# Patient Record
Sex: MALE | Race: WHITE | Employment: UNEMPLOYED | URBAN - NONMETROPOLITAN AREA
[De-identification: names, ages, dates, MRNs, and addresses within clinical notes are randomized per-mention and may not be internally consistent; named-entity substitution may affect disease eponyms.]

---

## 2021-08-18 ENCOUNTER — HOSPITAL ENCOUNTER (INPATIENT)
Facility: HOSPITAL | Age: 46
LOS: 16 days | Discharge: HOME | End: 2021-09-03
Attending: PHYSICIAN ASSISTANT | Admitting: STUDENT IN AN ORGANIZED HEALTH CARE EDUCATION/TRAINING PROGRAM
Payer: MEDICAID

## 2021-08-18 DIAGNOSIS — F22 PARANOIA (H): ICD-10-CM

## 2021-08-18 DIAGNOSIS — F22 DELUSIONS (H): ICD-10-CM

## 2021-08-18 DIAGNOSIS — F25.0 SCHIZOAFFECTIVE DISORDER, BIPOLAR TYPE (H): Primary | ICD-10-CM

## 2021-08-18 LAB
ALBUMIN SERPL-MCNC: 4.2 G/DL (ref 3.4–5)
ALBUMIN UR-MCNC: NEGATIVE MG/DL
ALP SERPL-CCNC: 63 U/L (ref 40–150)
ALT SERPL W P-5'-P-CCNC: 18 U/L (ref 0–70)
AMPHETAMINES UR QL: NOT DETECTED
ANION GAP SERPL CALCULATED.3IONS-SCNC: 4 MMOL/L (ref 3–14)
APPEARANCE UR: CLEAR
AST SERPL W P-5'-P-CCNC: 8 U/L (ref 0–45)
BARBITURATES UR QL SCN: NOT DETECTED
BASOPHILS # BLD AUTO: 0 10E3/UL (ref 0–0.2)
BASOPHILS NFR BLD AUTO: 0 %
BENZODIAZ UR QL SCN: DETECTED
BILIRUB SERPL-MCNC: 0.5 MG/DL (ref 0.2–1.3)
BILIRUB UR QL STRIP: NEGATIVE
BUN SERPL-MCNC: 13 MG/DL (ref 7–30)
BUPRENORPHINE UR QL: NOT DETECTED
CALCIUM SERPL-MCNC: 9.4 MG/DL (ref 8.5–10.1)
CANNABINOIDS UR QL: NOT DETECTED
CHLORIDE BLD-SCNC: 110 MMOL/L (ref 94–109)
CO2 SERPL-SCNC: 25 MMOL/L (ref 20–32)
COCAINE UR QL SCN: NOT DETECTED
COLOR UR AUTO: YELLOW
CREAT SERPL-MCNC: 1.03 MG/DL (ref 0.66–1.25)
D-METHAMPHET UR QL: NOT DETECTED
EOSINOPHIL # BLD AUTO: 0 10E3/UL (ref 0–0.7)
EOSINOPHIL NFR BLD AUTO: 0 %
ERYTHROCYTE [DISTWIDTH] IN BLOOD BY AUTOMATED COUNT: 12.2 % (ref 10–15)
ETHANOL SERPL-MCNC: <0.01 G/DL
GFR SERPL CREATININE-BSD FRML MDRD: 86 ML/MIN/1.73M2
GLUCOSE BLD-MCNC: 87 MG/DL (ref 70–99)
GLUCOSE UR STRIP-MCNC: NEGATIVE MG/DL
HCT VFR BLD AUTO: 44.1 % (ref 40–53)
HGB BLD-MCNC: 14.9 G/DL (ref 13.3–17.7)
HGB UR QL STRIP: NEGATIVE
IMM GRANULOCYTES # BLD: 0 10E3/UL
IMM GRANULOCYTES NFR BLD: 0 %
KETONES UR STRIP-MCNC: 10 MG/DL
LEUKOCYTE ESTERASE UR QL STRIP: NEGATIVE
LYMPHOCYTES # BLD AUTO: 2.5 10E3/UL (ref 0.8–5.3)
LYMPHOCYTES NFR BLD AUTO: 30 %
MCH RBC QN AUTO: 31.2 PG (ref 26.5–33)
MCHC RBC AUTO-ENTMCNC: 33.8 G/DL (ref 31.5–36.5)
MCV RBC AUTO: 92 FL (ref 78–100)
METHADONE UR QL SCN: NOT DETECTED
MONOCYTES # BLD AUTO: 0.4 10E3/UL (ref 0–1.3)
MONOCYTES NFR BLD AUTO: 5 %
MUCOUS THREADS #/AREA URNS LPF: PRESENT /LPF
NEUTROPHILS # BLD AUTO: 5.3 10E3/UL (ref 1.6–8.3)
NEUTROPHILS NFR BLD AUTO: 65 %
NITRATE UR QL: NEGATIVE
NRBC # BLD AUTO: 0 10E3/UL
NRBC BLD AUTO-RTO: 0 /100
OPIATES UR QL SCN: NOT DETECTED
OXYCODONE UR QL SCN: NOT DETECTED
PCP UR QL SCN: NOT DETECTED
PH UR STRIP: 5.5 [PH] (ref 4.7–8)
PLATELET # BLD AUTO: 290 10E3/UL (ref 150–450)
POTASSIUM BLD-SCNC: 4 MMOL/L (ref 3.4–5.3)
PROPOXYPH UR QL: NOT DETECTED
PROT SERPL-MCNC: 7.7 G/DL (ref 6.8–8.8)
RBC # BLD AUTO: 4.78 10E6/UL (ref 4.4–5.9)
RBC URINE: 0 /HPF
SARS-COV-2 RNA RESP QL NAA+PROBE: NEGATIVE
SODIUM SERPL-SCNC: 139 MMOL/L (ref 133–144)
SP GR UR STRIP: 1.03 (ref 1–1.03)
TRICYCLICS UR QL SCN: NOT DETECTED
UROBILINOGEN UR STRIP-MCNC: NORMAL MG/DL
WBC # BLD AUTO: 8.2 10E3/UL (ref 4–11)
WBC URINE: 1 /HPF

## 2021-08-18 PROCEDURE — 250N000013 HC RX MED GY IP 250 OP 250 PS 637: Performed by: PHYSICIAN ASSISTANT

## 2021-08-18 PROCEDURE — C9803 HOPD COVID-19 SPEC COLLECT: HCPCS

## 2021-08-18 PROCEDURE — 99284 EMERGENCY DEPT VISIT MOD MDM: CPT | Performed by: PHYSICIAN ASSISTANT

## 2021-08-18 PROCEDURE — 82040 ASSAY OF SERUM ALBUMIN: CPT | Performed by: PHYSICIAN ASSISTANT

## 2021-08-18 PROCEDURE — 81001 URINALYSIS AUTO W/SCOPE: CPT | Performed by: PHYSICIAN ASSISTANT

## 2021-08-18 PROCEDURE — U0005 INFEC AGEN DETEC AMPLI PROBE: HCPCS | Performed by: PHYSICIAN ASSISTANT

## 2021-08-18 PROCEDURE — 124N000001 HC R&B MH

## 2021-08-18 PROCEDURE — 82077 ASSAY SPEC XCP UR&BREATH IA: CPT | Performed by: PHYSICIAN ASSISTANT

## 2021-08-18 PROCEDURE — 99285 EMERGENCY DEPT VISIT HI MDM: CPT

## 2021-08-18 PROCEDURE — 36415 COLL VENOUS BLD VENIPUNCTURE: CPT | Performed by: PHYSICIAN ASSISTANT

## 2021-08-18 PROCEDURE — 250N000013 HC RX MED GY IP 250 OP 250 PS 637: Performed by: NURSE PRACTITIONER

## 2021-08-18 PROCEDURE — 80306 DRUG TEST PRSMV INSTRMNT: CPT | Performed by: PHYSICIAN ASSISTANT

## 2021-08-18 PROCEDURE — 85041 AUTOMATED RBC COUNT: CPT | Performed by: PHYSICIAN ASSISTANT

## 2021-08-18 RX ORDER — MAGNESIUM HYDROXIDE/ALUMINUM HYDROXICE/SIMETHICONE 120; 1200; 1200 MG/30ML; MG/30ML; MG/30ML
30 SUSPENSION ORAL EVERY 4 HOURS PRN
Status: DISCONTINUED | OUTPATIENT
Start: 2021-08-18 | End: 2021-09-03 | Stop reason: HOSPADM

## 2021-08-18 RX ORDER — HYDROXYZINE HYDROCHLORIDE 25 MG/1
25-50 TABLET, FILM COATED ORAL EVERY 4 HOURS PRN
Status: DISCONTINUED | OUTPATIENT
Start: 2021-08-18 | End: 2021-09-03 | Stop reason: HOSPADM

## 2021-08-18 RX ORDER — OLANZAPINE 10 MG/2ML
5-10 INJECTION, POWDER, FOR SOLUTION INTRAMUSCULAR 3 TIMES DAILY PRN
Status: DISCONTINUED | OUTPATIENT
Start: 2021-08-18 | End: 2021-08-19

## 2021-08-18 RX ORDER — ACETAMINOPHEN 325 MG/1
650 TABLET ORAL EVERY 4 HOURS PRN
Status: DISCONTINUED | OUTPATIENT
Start: 2021-08-18 | End: 2021-09-03 | Stop reason: HOSPADM

## 2021-08-18 RX ORDER — LORAZEPAM 1 MG/1
2 TABLET ORAL ONCE
Status: COMPLETED | OUTPATIENT
Start: 2021-08-18 | End: 2021-08-18

## 2021-08-18 RX ORDER — OLANZAPINE 5 MG/1
10 TABLET, ORALLY DISINTEGRATING ORAL ONCE
Status: COMPLETED | OUTPATIENT
Start: 2021-08-18 | End: 2021-08-18

## 2021-08-18 RX ORDER — OLANZAPINE 5 MG/1
5-10 TABLET ORAL 3 TIMES DAILY PRN
Status: DISCONTINUED | OUTPATIENT
Start: 2021-08-18 | End: 2021-08-19

## 2021-08-18 RX ORDER — LANOLIN ALCOHOL/MO/W.PET/CERES
3 CREAM (GRAM) TOPICAL
Status: DISCONTINUED | OUTPATIENT
Start: 2021-08-18 | End: 2021-09-03 | Stop reason: HOSPADM

## 2021-08-18 RX ORDER — OLANZAPINE 10 MG/1
10 TABLET, ORALLY DISINTEGRATING ORAL ONCE
Status: COMPLETED | OUTPATIENT
Start: 2021-08-18 | End: 2021-08-18

## 2021-08-18 RX ORDER — AMOXICILLIN 250 MG
1 CAPSULE ORAL 2 TIMES DAILY PRN
Status: DISCONTINUED | OUTPATIENT
Start: 2021-08-18 | End: 2021-09-03 | Stop reason: HOSPADM

## 2021-08-18 RX ORDER — OLANZAPINE 15 MG/1
30 TABLET ORAL EVERY EVENING
Status: ON HOLD | COMMUNITY
End: 2021-09-03

## 2021-08-18 RX ADMIN — OLANZAPINE 10 MG: 5 TABLET, ORALLY DISINTEGRATING ORAL at 16:41

## 2021-08-18 RX ADMIN — LORAZEPAM 2 MG: 1 TABLET ORAL at 22:03

## 2021-08-18 RX ADMIN — NICOTINE POLACRILEX 2 MG: 2 GUM, CHEWING ORAL at 22:00

## 2021-08-18 RX ADMIN — OLANZAPINE 10 MG: 10 TABLET, ORALLY DISINTEGRATING ORAL at 22:01

## 2021-08-18 ASSESSMENT — ENCOUNTER SYMPTOMS
DECREASED CONCENTRATION: 1
CHILLS: 0
CONFUSION: 0
NERVOUS/ANXIOUS: 1
AGITATION: 0
HALLUCINATIONS: 1
SLEEP DISTURBANCE: 1
FEVER: 0
DYSPHORIC MOOD: 1

## 2021-08-18 ASSESSMENT — ACTIVITIES OF DAILY LIVING (ADL)
DIFFICULTY_COMMUNICATING: NO
DOING_ERRANDS_INDEPENDENTLY_DIFFICULTY: NO
TOILETING_ISSUES: NO
WALKING_OR_CLIMBING_STAIRS_DIFFICULTY: NO
DIFFICULTY_EATING/SWALLOWING: NO
DRESSING/BATHING_DIFFICULTY: NO
FALL_HISTORY_WITHIN_LAST_SIX_MONTHS: NO
CONCENTRATING,_REMEMBERING_OR_MAKING_DECISIONS_DIFFICULTY: OTHER (SEE COMMENTS)

## 2021-08-18 ASSESSMENT — MIFFLIN-ST. JEOR: SCORE: 1569.81

## 2021-08-18 NOTE — PROGRESS NOTES
"Care Transitions focused note:      Met with patient briefly.  He is concerned about his mother and step dad waiting for him while he is here.  Very anxious and wanting to \"get the show on the road\"      Spoke to mother and step dad. [place]:    Patient has been homeless since Thanksgiving.  Floating between a friends and his mothers in [place], [place].    Patient is on Social Security Disability due to mental health reasons for quite some time.  Mother believes patient has dx of: Schizophrenia and Bipolar.  Has long standing history with [facility] who prescribes medications.  Had been on 2 meds but the NP at [facility] had patient stop one medication for sleep when she discovered he had actively been drinking alcohol of late.  Mother states patient is an alcoholic and has been drinking  Mother states patient has has been having visual hallucinations for the last 3 weeks and patient has been stating people are out to kill him.  Locks doors and has not left the home for approximately 2 weeks.    Mother and step father would like the patient admitted - they said he has spiraled downward and feels intervention is needed.      "

## 2021-08-18 NOTE — ED NOTES
"Care Transitions focused note:      Chart reviewed, patient presented with his mother and step dad.  Met with patient first and he stated he is uncomfortable staying at his mother's any longer and needs to get an apartment.  He also stated that he can go and stay with his father who lives in [place].  Andre provided little insight and is unsure why he is in the emergency department.  Denies being suicidal or homicidal at this time.  History of alcoholism but stated he has maybe had one drink in 3 weeks.      Met with mom and step dad.  They stated patient was living in [place] homeless and on the streets.  They called him in May and he was living in a garage and asked his mother to come and get him to stay with her.  He was living in the woods and a garage in [place] and told his mother he was going to be killed by \"natives\"  Mom Rafia reports that patient did fairly well when he first moved in with them but has been declining in recent weeks.    Pt was interacting with them, going on walks, eating with them etc.  In the recent weeks he has been having more paranoia, peering out windows feeling like the \"natives\" are out to get him and kill him.  Has been in the basement isolating and on his phone in the dark and not interacting.  Rafia also stated that he has no relationship with his father who is schizophrenic and Andre is not able to move in with him.     The deal when he moved in with Rafia is that he would get help for his mental health and alcohol issue.  He supposedly had a rule 25 at [facility] in [place] and they rec out patient but Andre told Rafia he lied on it.  Stating today to them that he wants help for his mental health.    Andre is not saying any of this to myself and the provider.  Rafia is very afraid that Andre is going to \"snap\"  He has not been taking his medications per Rafia (he told me that he was taking his meds) She feels he needs in patient for medication " adjustment and possible treatment after.    Updated provider.  DEC pending.    Will follow    PAO Triana

## 2021-08-18 NOTE — ED NOTES
"\"I have been living with my mom for about 2 months.  My mom is worried about me with a little bit of drinking.  I used to live in [place].  My mom and my step dad brought me here for my worsening depression.  My last drink was about 3 weeks ago.  I am putting stress on their marriage.\"  "

## 2021-08-19 PROBLEM — S42.301A RIGHT HUMERAL FRACTURE: Status: ACTIVE | Noted: 2019-03-22

## 2021-08-19 PROBLEM — Z87.898 HISTORY OF SEIZURE: Status: ACTIVE | Noted: 2020-12-23

## 2021-08-19 PROBLEM — F10.21 ALCOHOL USE DISORDER, MODERATE, IN EARLY REMISSION (H): Status: ACTIVE | Noted: 2020-12-23

## 2021-08-19 PROBLEM — N17.9 AKI (ACUTE KIDNEY INJURY) (H): Status: ACTIVE | Noted: 2019-03-22

## 2021-08-19 PROBLEM — E87.20 LACTIC ACIDOSIS: Status: ACTIVE | Noted: 2019-03-22

## 2021-08-19 PROCEDURE — 250N000013 HC RX MED GY IP 250 OP 250 PS 637: Performed by: NURSE PRACTITIONER

## 2021-08-19 PROCEDURE — 124N000001 HC R&B MH

## 2021-08-19 PROCEDURE — 99223 1ST HOSP IP/OBS HIGH 75: CPT | Performed by: NURSE PRACTITIONER

## 2021-08-19 RX ORDER — IBUPROFEN 600 MG/1
600 TABLET, FILM COATED ORAL 3 TIMES DAILY PRN
Status: ON HOLD | COMMUNITY
Start: 2020-07-12 | End: 2021-08-19

## 2021-08-19 RX ORDER — OLANZAPINE 10 MG/1
30 TABLET ORAL EVERY EVENING
Status: DISCONTINUED | OUTPATIENT
Start: 2021-08-19 | End: 2021-09-03 | Stop reason: HOSPADM

## 2021-08-19 RX ORDER — LIDOCAINE 4 G/G
1 PATCH TOPICAL EVERY 24 HOURS
Status: ON HOLD | COMMUNITY
Start: 2021-05-26 | End: 2021-08-19

## 2021-08-19 RX ORDER — QUETIAPINE FUMARATE 50 MG/1
50-100 TABLET, FILM COATED ORAL 2 TIMES DAILY PRN
Status: DISCONTINUED | OUTPATIENT
Start: 2021-08-19 | End: 2021-08-25

## 2021-08-19 RX ADMIN — NICOTINE POLACRILEX 2 MG: 2 GUM, CHEWING ORAL at 08:59

## 2021-08-19 RX ADMIN — OLANZAPINE 30 MG: 10 TABLET, FILM COATED ORAL at 20:03

## 2021-08-19 ASSESSMENT — ACTIVITIES OF DAILY LIVING (ADL)
DRESS: INDEPENDENT
DRESS: SCRUBS (BEHAVIORAL HEALTH)
ORAL_HYGIENE: INDEPENDENT
HYGIENE/GROOMING: INDEPENDENT
HYGIENE/GROOMING: INDEPENDENT
LAUNDRY: UNABLE TO COMPLETE
ORAL_HYGIENE: INDEPENDENT

## 2021-08-19 NOTE — ED NOTES
Pt agreeable to admission at this time. Pt is currently talking with Nurys from DEC again. Per provider Zyprexa not given at this time because pt is cooperative.

## 2021-08-19 NOTE — PROGRESS NOTES
08/18/21 2239   Patient Belongings   Patient Belongings locker;sent to security per site process   Patient Belongings Put in Hospital Secure Location (Security or Locker, etc.) cash/credit card;cell phone/electronics;money (see comment);wallet   Belongings Search Yes   Clothing Search Yes   Second Staff Yudelka MAURER   Comment Black hat, black tennis shoes, 3 pairs of underwear, grey long sleeve shirt, blue pen, blue long sleeve button up, jeans x2, white socks, blue backpack, black gaitor, white samsung , gray long sleeve shirt, electric shaver, lighter, blue portable , clack socks, green button-up, new pack of socks, black button up, black sweat pants, grey sweater, black sweatshirt   List items sent to safe: Corinne knife, 2 black smartphones w/ no damage in cases, [place] EBT card, [place] bank visa card, 2 [place] Ids, 3 $1 bills, 3 $20 bills, 10 $100 bills TOTAL= $1,063.  All other belongings put in assigned cubby in belongings room.     I have reviewed my belongings list on admission and verify that it is correct.     Patient signature_______________________________    Second staff witness (if patient unable to sign) ______________________________       I have received all my belongings at discharge.    Patient signature________________________________    Elida BARAHONA.  8/18/2021  10:44 PM

## 2021-08-19 NOTE — PLAN OF CARE
Problem: Adult Inpatient Plan of Care  Goal: Patient-Specific Goal (Individualized)  Description: Client will sleep 6-8 hour nightly.  Client will eat 50% of meals daily.  Client will attend 50% of groups daily.  Client will take medications as prescribed daily.  Client will perform ADL's daily.  Outcome: Improving     Face to face shift report received from RN. Rounding completed, pt observed. Client rested in room for 7 hours with eyes closed and respirations noted.Face to face report will be communicated to oncoming RN.    Matti Lorenzo RN  8/19/2021  6:10 AM

## 2021-08-19 NOTE — PLAN OF CARE
Prior to Admission Medication Reconciliation:     Medications added:   [] None  [x] As listed below:    Nicotine gum- pt confirms still using    Medications deleted:   [] None  [x] As listed below:    Ibuprofen- therapy completed    Lidocaine patch- therapy complete    Medications marked for review/removal by attending:  [x] None  [] As listed below:    Changes made to existing medications:   [] None  [x] As listed below:    Input strength of zyprexa- pt confirmed strength and instructions    Last times/dates taken verified with patient:  [x] Yes- completed myself  [] Prepared PTA medlist for review only. (will not be available to review personally)  [] Did not review with patient. Rx verification only. Review completed by nursing.    [] Nurse completed no changes made (double checked entries)  [] Unable to review with patient at this time:  [] Nurse completed/changes made:     Allergies listed at another location:  []Not applicable   []See below:    Allergy review:    [x]Did not review: reviewed by nursing  []Did not review: pt unable at this time  []Patient/MAR verified NKDA  []Patient/MAR verified current existing allergies: no changes made    Medication reconciliation sources:   []Patient  []Patient family member/emergency contact: **  [][facility] Report Review  []Epic Chart Review  [x]Care Everywhere review:  Medication Sig   ibuprofen (Motrin) 600 MG tablet   Take 1 Tab by mouth three times a day as needed for Pain . Administer with food.   nicotine polacrilex ([facility] Nicotine) 4 MG gum    Indications: Tobacco use disorder Take 1 Stick by mouth as needed for Smoking cessation.   lidocaine (Aspercreme, Salonpas) 4 % patch   Place 1 Patch onto the skin every 24 hours. Remove patch(es) after 12 hours.   OLANZapine (ZyPREXA) 15 MG tablet    Indications: Schizoaffective disorder, unspecified type (HCC) Take 2 Tablets (30 mg) by mouth every evening. Stop Restoril.       []Pharmacy med list: **  [x]Pharmacy phone  call  [x]Olanzapine 15 mg takes 2 tabs qpm 8/6/21 - refill too soon, [facility] it [facility] for  now (unsure where it was first filled at)  []Outside meds dispense report  []Nursing home or Assisted Living MAR:  []Other: **    Pharmacy desired at discharge: [facility]    Is patient on coumadin?  [x]No    Requests for consultation by provider or pharmacist:   [x] Patient understands why all of their meds were prescribed and how to take them. No questions.   [] Managing party has no questions.   [] Patient/ managing party has questions about the following:  [] Did not review with patient. Cannot assess.     Fill dates and reported compliancy:  [] Fill dates coincide with compliancy for all/most maintenance meds.   [] Fill dates do not coincide with compliancy with maintenance meds. See notes in PTA medlist and in comments.    [] Fill dates do not coincide with the following medications but pt reports compliancy:  [x] Did not review with patient. Cannot assess. Unsure where first fill was sent    Historian accuracy:  [] Excellent- alert and oriented, understands why meds were prescribed and how to take, able to answer specifics  [x] Good- alert and oriented, understands why meds were prescribed and how to take, some confusion   [] Fair- alert and oriented, doesn't know medications without list, cannot answer specifics about medications, but has a decent process for which to take at home  [] Poor- does not know medications, may not have a process to take at home, may be cognitively unable to review at this time  []Medication management done by family member or facility, no concerns about historian accuracy.   [] Did not review with patient. Cannot assess.     Medication Management:  [x] Manages meds independently  [] Family member/ other party manages meds:  [] Meds managed by staff at facility  [] Meds set up by home care, family/other party helps administer  [] Meds set up by home care, self administers  [] Did  not review with patient. Cannot assess.     Other medications aside from PTA:  [x] Denies taking any medications aside from those listed in PTA meds  [] Reports taking another medication(s) but cannot recall the name(s)  [] Refuses to say.  [] Did not review with patient. Cannot assess.     Comments: none     Lori Holguin on 8/19/2021 at 11:45 AM       Discrepancies: [x] No []Not Applicable []Yes: listed below    Time spent on medication reconciliation:   [x]5-20 mins  []20-40 mins  []> 40 mins    Issues completing PTA medication reconciliation:  [] On hold for a long time  [] Waited for a call back  [] Fax didn't come through  [] Fax took a long time  [] Other:    Notifying appropriate party of changes/additions/discrepancies:  [x]No pertinent changes made, notification not necessary.   [] Notified attending provider via text page/phone call  [] Notified attending provider in person  [] Notified pharmacy  [] Notified nurse  [] Attending provider not available, left detailed notes  [] Pt is not admitted to floor yet, PTA meds completed before admission.

## 2021-08-19 NOTE — PLAN OF CARE
"ADMISSION NOTE    Reason for admission worsening depression  Safety concerns none  Risk for or history of violence no.   Full skin assessment: WDL    Patient arrived on unit from ED  accompanied by security on 8/18/2021  10:29 PM.   Status on arrival: calm, cooperative, flat affect  /83   Pulse 94   Temp 98.3  F (36.8  C) (Temporal)   Resp 16   Ht 1.778 m (5' 10\")   Wt 68.9 kg (151 lb 12.8 oz)   SpO2 99%   BMI 21.78 kg/m    Patient given tour of unit and Welcome to  unit papers given to patient, wanding completed, belongings inventoried, and admission assessment completed.   Patient's legal status on arrival is voluntary. Appropriate legal rights discussed with and copy given to patient. Patient Bill of Rights discussed with and copy given to patient.   Patient denies SI, HI, and thoughts of self harm and contracts for safety while on unit.      Alyssia Ontiveros RN  8/18/2021  10:29 PM              "

## 2021-08-19 NOTE — PLAN OF CARE
"Social Service Psychosocial Assessment  Presenting Problem: Pt presented to the ED by Self and Family/Friends related to concerns for increasing paranoia, alcohol use, isolation, and concerns about visual hallucinations.   Marital Status: Not    Spouse / Children: None   Psychiatric TX HX: Has history of inpatient hospitalizations, last being 15 years ago. [facility] has been [facility]. Pt has not bee to our unit before. In the recent weeks pt has been having more paranoia, peering out windows feeling like the \"natives\" are out to get him and kill him. Has been in the basement isolating and on his phone in the dark and not interacting.   Suicide Risk Assessment: Pt denies feeling suicidal on admit and denies hx of SI. Pt denies any SI today.   Access to Lethal Means (explain):  Denies  Family Psych HX:  Father who is schizophrenic  A & Ox: x3    Medication Adherence: Unknown   Medical Issues: See H&P    Visual -Motor Functioning: Good    Communication Skills /Needs: Good   Ethnicity: White   Spirituality/Congregation Affiliation: Sabianism \"I don't go to Caodaism\".    Clergy Request: No   History: Denies   Living Situation: Pt has been living with their mom for about 2 months. Patient was living in [facility] and on the streets. He was living in the woods and a garage.    ADL s: Independent  Education: Graduated High school  Financial Situation: Patient is on social security    Occupation: Unemployed  Leisure & Recreation: Fishing  Childhood History: Pt lived with mom and dad growing up. Parent got . \"it was hard\".     Trauma Abuse HX: \"jaime, I don't know\"     Relationship / Sexuality: Single     Substance Use/ Abuse: Utox positive for Benzodiazepines. History of alcoholism but stated he has maybe had one drink in 3 weeks. Pt denies and other substance abuse.   Chemical Dependency Treatment HX: Pt states they had a rule 25 at [facility] in [place] and they reccomended out patient. Pt " is unsure how long ago this was. Pt has been to a cd treatment back in 2000.  Legal Issues: Pt denies  Significant Life Events: Unknown   Strengths: In a safe environment, open to services  Challenges /Limitation: cd, mental health, poor coping skills, homelessness.   Patient Support Contact (Include name, relationship, number, and summary of conversation):  Pt has DEBBIE signed for Rafia Freitas (mother) [phone]  Interventions:       Community-Based Programs- Would benefit    Medical/Dental Care- No PCP on file    CD Evaluation/Rule 25/Aftercare- Would Benefit    Medication Management- Would Benefit    Individual Therapy- Would benefit    Housing/Placement- Homeless    Insurance Coverage- MEDICAID    Financial Assistance- Social Security for disability     Commit/Powers Screening-    Suicide Risk Assessment-  Pt denies feeling suicidal on admit and denies hx of SI. Pt denies any SI today.     High Risk Safety Plan- Talk to supports; Call crisis lines; Go to local ER if feeling suicidal.  PAO WARD  8/19/2021  8:13 AM

## 2021-08-19 NOTE — ED PROVIDER NOTES
History     Chief Complaint   Patient presents with     Depression     long hx and sees a psychiatrist at [facility]     Alcohol Intoxication     last drank alcohol a few weeks ago.      The history is provided by the patient.     Andre Bustos is a 46 year old male who resented to the emergency department ambulatory along with mother and father for evaluation of worsening depression, delusions, paranoia, and what sounds to be auditory hallucinations.  The patient used to live in the [facility] area and was homeless as well as an intermittent drug abuse and alcohol abuser.  Recently living with his parents since May in [facility].  He is supposed to take Zyprexa daily for underlying psychiatric disorder.  Denies homicidal or suicidal ideation.  Denies any recent drug abuse.  Denies any recent alcohol abuse.      Allergies:  No Known Allergies    Problem List:    Patient Active Problem List    Diagnosis Date Noted     Delusions (H) 08/18/2021     Priority: Medium     Paranoia (H) 08/18/2021     Priority: Medium        Past Medical History:    No past medical history on file.    Past Surgical History:    No past surgical history on file.    Family History:    No family history on file.    Social History:  Marital Status:  Single [1]  Social History     Tobacco Use     Smoking status: Not on file   Substance Use Topics     Alcohol use: Not on file     Drug use: Not on file        Medications:    OLANZapine (ZYPREXA PO)          Review of Systems   Constitutional: Negative for chills and fever.   Cardiovascular: Negative for chest pain.   Psychiatric/Behavioral: Positive for behavioral problems, decreased concentration, dysphoric mood, hallucinations and sleep disturbance. Negative for agitation, confusion, self-injury and suicidal ideas. The patient is nervous/anxious.        Physical Exam   BP: 107/79  Pulse: (!) 123  Temp: 98.1  F (36.7  C)  Resp: (!) 1  SpO2: 97 %      Physical Exam  Vitals and nursing  note reviewed.   Constitutional:       General: He is not in acute distress.     Appearance: Normal appearance. He is normal weight. He is not toxic-appearing or diaphoretic.   Pulmonary:      Effort: Pulmonary effort is normal.   Skin:     General: Skin is warm and dry.      Capillary Refill: Capillary refill takes less than 2 seconds.   Neurological:      General: No focal deficit present.      Mental Status: He is alert and oriented to person, place, and time.   Psychiatric:      Comments: Paranoid         ED Course     ED Course as of Aug 18 1937   Wed Aug 18, 2021   1441 Parents report worsening behaviors at home including significant paranoia.  They report the patient is not taking his medications.  We will be waiting on DEC      1842 Spoke with DEC. As of now they cannot find a reason too hold.  Calling mother now for additional information       1919 Patient is agreeable for admission.  I believe this is certainly the most reasonable plan.  Certainly decompensating.        Procedures              Critical Care time:  none               Results for orders placed or performed during the hospital encounter of 08/18/21 (from the past 24 hour(s))   CBC with platelets differential    Narrative    The following orders were created for panel order CBC with platelets differential.  Procedure                               Abnormality         Status                     ---------                               -----------         ------                     CBC with platelets and d...[[id number]]                      Final result                 Please view results for these tests on the individual orders.   Comprehensive metabolic panel   Result Value Ref Range    Sodium 139 133 - 144 mmol/L    Potassium 4.0 3.4 - 5.3 mmol/L    Chloride 110 (H) 94 - 109 mmol/L    Carbon Dioxide (CO2) 25 20 - 32 mmol/L    Anion Gap 4 3 - 14 mmol/L    Urea Nitrogen 13 7 - 30 mg/dL    Creatinine 1.03 0.66 - 1.25 mg/dL    Calcium 9.4 8.5 -  10.1 mg/dL    Glucose 87 70 - 99 mg/dL    Alkaline Phosphatase 63 40 - 150 U/L    AST 8 0 - 45 U/L    ALT 18 0 - 70 U/L    Protein Total 7.7 6.8 - 8.8 g/dL    Albumin 4.2 3.4 - 5.0 g/dL    Bilirubin Total 0.5 0.2 - 1.3 mg/dL    GFR Estimate 86 >60 mL/min/1.73m2   Ethyl Alcohol Level   Result Value Ref Range    Alcohol ethyl <0.01 <=0.01 g/dL   CBC with platelets and differential   Result Value Ref Range    WBC Count 8.2 4.0 - 11.0 10e3/uL    RBC Count 4.78 4.40 - 5.90 10e6/uL    Hemoglobin 14.9 13.3 - 17.7 g/dL    Hematocrit 44.1 40.0 - 53.0 %    MCV 92 78 - 100 fL    MCH 31.2 26.5 - 33.0 pg    MCHC 33.8 31.5 - 36.5 g/dL    RDW 12.2 10.0 - 15.0 %    Platelet Count 290 150 - 450 10e3/uL    % Neutrophils 65 %    % Lymphocytes 30 %    % Monocytes 5 %    % Eosinophils 0 %    % Basophils 0 %    % Immature Granulocytes 0 %    NRBCs per 100 WBC 0 <1 /100    Absolute Neutrophils 5.3 1.6 - 8.3 10e3/uL    Absolute Lymphocytes 2.5 0.8 - 5.3 10e3/uL    Absolute Monocytes 0.4 0.0 - 1.3 10e3/uL    Absolute Eosinophils 0.0 0.0 - 0.7 10e3/uL    Absolute Basophils 0.0 0.0 - 0.2 10e3/uL    Absolute Immature Granulocytes 0.0 <=0.0 10e3/uL    Absolute NRBCs 0.0 10e3/uL   UA with Microscopic reflex to Culture    Specimen: Urine, Midstream   Result Value Ref Range    Color Urine Yellow Colorless, Straw, Light Yellow, Yellow    Appearance Urine Clear Clear    Glucose Urine Negative Negative mg/dL    Bilirubin Urine Negative Negative    Ketones Urine 10  (A) Negative mg/dL    Specific Gravity Urine 1.028 1.003 - 1.035    Blood Urine Negative Negative    pH Urine 5.5 4.7 - 8.0    Protein Albumin Urine Negative Negative mg/dL    Urobilinogen Urine Normal Normal, 2.0 mg/dL    Nitrite Urine Negative Negative    Leukocyte Esterase Urine Negative Negative    Mucus Urine Present (A) None Seen /LPF    RBC Urine 0 <=2 /HPF    WBC Urine 1 <=5 /HPF    Narrative    Urine Culture not indicated   Urine Drugs of Abuse Screen    Narrative    The following  orders were created for panel order Urine Drugs of Abuse Screen.  Procedure                               Abnormality         Status                     ---------                               -----------         ------                     Urine Drugs of Abuse Scr...[[id number]]  Abnormal            Final result                 Please view results for these tests on the individual orders.   Urine Drugs of Abuse Screen Panel 13   Result Value Ref Range    Cannabinoids (74-ahf-8-carboxy-9-THC) Not Detected Not Detected, Indeterminate    Phencyclidine Not Detected Not Detected, Indeterminate    Cocaine (Benzoylecgonine) Not Detected Not Detected, Indeterminate    Methamphetamine (d-Methamphetamine) Not Detected Not Detected, Indeterminate    Opiates (Morphine) Not Detected Not Detected, Indeterminate    Amphetamine (d-Amphetamine) Not Detected Not Detected, Indeterminate    Benzodiazepines (Nordiazepam) Detected (A) Not Detected, Indeterminate    Tricyclic Antidepressants (Desipramine) Not Detected Not Detected, Indeterminate    Methadone Not Detected Not Detected, Indeterminate    Barbiturates (Butalbital) Not Detected Not Detected, Indeterminate    Oxycodone Not Detected Not Detected, Indeterminate    Propoxyphene (Norpropoxyphene) Not Detected Not Detected, Indeterminate    Buprenorphine Not Detected Not Detected, Indeterminate       Medications   LORazepam (ATIVAN) tablet 2 mg (2 mg Oral Not Given 8/18/21 1812)   OLANZapine zydis (zyPREXA) ODT tab 10 mg (has no administration in time range)   OLANZapine zydis (zyPREXA) ODT tab 10 mg (10 mg Oral Given 8/18/21 1641)       Assessments & Plan (with Medical Decision Making)   46-year-old male who presented to the emergency department along with parents for evaluation of worsening mental health.  No SI or HI. DEC assessment.  Discussion with parents.  I believe he would fit any reasonable indication for behavioral health admission.  Likely noncompliant with his  medications.  Significant decompensation over the last 1 month.  Graciously excepted by [facility].    This document was prepared using a combination of typing and voice generated software.  While every attempt was made for accuracy, spelling and grammatical errors may exist.    I have reviewed the nursing notes.    I have reviewed the findings, diagnosis, plan and need for follow up with the patient.       New Prescriptions    No medications on file       Final diagnoses:   Delusions (H)   Paranoia (H)       8/18/2021   [facility]     Kermit Greene PA-C  08/18/21 1938

## 2021-08-19 NOTE — ED NOTES
Pt was changed into scrubs with much redirection. Pt seems to agitated and unwilling to stay at the hospital overnight.    No

## 2021-08-19 NOTE — H&P
"Psychiatric Eval/H&P  Patient Name: Andre Bustos   YOB: 1975  Age: 46 year old  [id number]    Primary Physician: No primary care provider on file.   Completed By: Hemalatah Serrato NP     CC: Increased paranoia    HPI   Andre Bustos is a 46 year old single male who was brought in to the [facility] ED by his family for evaluation of increased paranoia and visual hallucinations. Family reported that he has not been taking his medications, though patient states that he is. Moved in with mother and stepfather about 2 months ago after being homeless in [place]. Mother reported feeling that he is \"spiraling downward\" and needs help with mental health and chemical dependency. History of alcohol abuse, though minimal use in last 3 weeks per his report. He was agreeable to voluntary admission and was transferred to behavioral health for further evaluation.    Andre is quite dismissive today when I attempt to meet with him, states he did not sleep last night due to being in the ED. \"Can you ask those questions later, I'm tired\". We do review that he sees a psychiatric provider through [facility], most recently prescribed Zyprexa. He denies any hallucinations at this time and denies suicidal or homicidal ideation.     Collateral was obtained from his mother, who brought him to the ED. She reports that he has been paranoid, has been hiding in their basement in the dark and peeking out the windows. Reports that the \"natives\" are going to come and kill him. Mother reports that he has been having visual hallucinations for the last 3 weeks. She also reports that he has not been compliant with medication, though the patient insists that he has been taking them. She would like to see him get help for his MH and his alcohol use.     In review of available records it appears that he is currently prescribed Zyprexa 30 mg at bedtime. For the last several years it appears that he was also taking Temazepam at bedtime, though " "his provider discontinued this at some point when they found out he had been drinking. Unclear when this was stopped. In discussion today he does indicate that the Zyprexa works and wishes to continue on this. It does appear that his mental health has been relatively stable on Zyprexa for many years with last hospitalization being at least 10 years ago. It is likely that he was not taking the medication prior to admission, possibly due to issues related to his relocation from [place], where his services are based, to his mother's home near [place].       [facility]     Past Psychiatric History:   Reports last hospitalization about 15 years ago. Gets his mental health care through [facility] in [place]. Notes indicate history of schizoaffective disorder, major depressive disorder, alcohol use disorder. Currently taking Zyprexa at bedtime. Historical meds include Temazepam. Appears to have been on this combination for at least the last 10 years.     Social History:   Notes indicate he grew up with mother and father until they . Originally from [place], [place]. Graduated high school. Not , no children. Had been homeless in [place] though 2 months ago went to live with mother and stepfather. Unemployed, on social security.      Chemical Use History:   Records show history of alcohol use disorder. History of ETOH withdrawal seizures in 2019. Reports having one drink in the last 3 weeks.      Family Psychiatric History:   Records indicate father with schizophrenia.       MSE/PSYCH  PSYCHIATRIC EXAM  /75 (BP Location: Right arm)   Pulse 97   Temp 97.5  F (36.4  C) (Temporal)   Resp 18   Ht 1.778 m (5' 10\")   Wt 68.9 kg (151 lb 12.8 oz)   SpO2 98%   BMI 21.78 kg/m    -Appearance/Behavior:  No apparent distress and Appears stated age, slightly disheveled    -Attitude:guarded, dismissive  -Motor: normal or unremarkable.  -Gait: not observed, patient in bed    -Abnormal involuntary movements: none noted  -Mood: " "irritable today, reports \"tired\"  -Affect: mood congruent  -Speech: clear, coherent                 -Thought process/associations: difficult to assess though seems linear during our brief interaction  -Thought content: difficult to assess, had been expressing paranoia at home  -Perceptual disturbances: denies today             -Suicidal/Homicidal Ideation: denies any suicidal or homicidal ideation  -Judgment: Limited.  -Insight: Limited.  *Orientation: person, place, time  *Memory: seems to be intact.  *Attention: short, dismissive  *Language: fluent, no aphasias, able to repeat phrases and name objects. Vocab intact.  *Fund of information: appropriate for education            Medical Review of Systems:   Medical History and ROS  Prior to Admission medications    Medication Sig Start Date End Date Taking? Authorizing Provider   ibuprofen (ADVIL/MOTRIN) 600 MG tablet Take 600 mg by mouth 3 times daily as needed 7/12/20  Yes Reported, Patient   Lidocaine (LIDOCARE) 4 % Patch Place 1 patch onto the skin every 24 hours 5/26/21  Yes Reported, Patient   nicotine polacrilex (NICORETTE) 4 MG gum Take 4 mg by mouth as needed 1/22/21  Yes Reported, Patient   OLANZapine (ZYPREXA) 15 MG tablet Take 30 mg by mouth daily    Yes Reported, Patient     No Known Allergies     No past medical history on file.     No past surgical history on file.      Physical Exam and Review of Systems: see H&P completed by Veronica Church CNP. Reviewed with no notable changes.         Labs:   Results for orders placed or performed during the hospital encounter of 08/18/21   CBC with platelets differential     Status: None    Narrative    The following orders were created for panel order CBC with platelets differential.  Procedure                               Abnormality         Status                     ---------                               -----------         ------                     CBC with platelets and d...[[id number]]                      " Final result                 Please view results for these tests on the individual orders.   Comprehensive metabolic panel     Status: Abnormal   Result Value Ref Range    Sodium 139 133 - 144 mmol/L    Potassium 4.0 3.4 - 5.3 mmol/L    Chloride 110 (H) 94 - 109 mmol/L    Carbon Dioxide (CO2) 25 20 - 32 mmol/L    Anion Gap 4 3 - 14 mmol/L    Urea Nitrogen 13 7 - 30 mg/dL    Creatinine 1.03 0.66 - 1.25 mg/dL    Calcium 9.4 8.5 - 10.1 mg/dL    Glucose 87 70 - 99 mg/dL    Alkaline Phosphatase 63 40 - 150 U/L    AST 8 0 - 45 U/L    ALT 18 0 - 70 U/L    Protein Total 7.7 6.8 - 8.8 g/dL    Albumin 4.2 3.4 - 5.0 g/dL    Bilirubin Total 0.5 0.2 - 1.3 mg/dL    GFR Estimate 86 >60 mL/min/1.73m2   Ethyl Alcohol Level     Status: Normal   Result Value Ref Range    Alcohol ethyl <0.01 <=0.01 g/dL   UA with Microscopic reflex to Culture     Status: Abnormal    Specimen: Urine, Midstream   Result Value Ref Range    Color Urine Yellow Colorless, Straw, Light Yellow, Yellow    Appearance Urine Clear Clear    Glucose Urine Negative Negative mg/dL    Bilirubin Urine Negative Negative    Ketones Urine 10  (A) Negative mg/dL    Specific Gravity Urine 1.028 1.003 - 1.035    Blood Urine Negative Negative    pH Urine 5.5 4.7 - 8.0    Protein Albumin Urine Negative Negative mg/dL    Urobilinogen Urine Normal Normal, 2.0 mg/dL    Nitrite Urine Negative Negative    Leukocyte Esterase Urine Negative Negative    Mucus Urine Present (A) None Seen /LPF    RBC Urine 0 <=2 /HPF    WBC Urine 1 <=5 /HPF    Narrative    Urine Culture not indicated   CBC with platelets and differential     Status: None   Result Value Ref Range    WBC Count 8.2 4.0 - 11.0 10e3/uL    RBC Count 4.78 4.40 - 5.90 10e6/uL    Hemoglobin 14.9 13.3 - 17.7 g/dL    Hematocrit 44.1 40.0 - 53.0 %    MCV 92 78 - 100 fL    MCH 31.2 26.5 - 33.0 pg    MCHC 33.8 31.5 - 36.5 g/dL    RDW 12.2 10.0 - 15.0 %    Platelet Count 290 150 - 450 10e3/uL    % Neutrophils 65 %    % Lymphocytes 30 %     % Monocytes 5 %    % Eosinophils 0 %    % Basophils 0 %    % Immature Granulocytes 0 %    NRBCs per 100 WBC 0 <1 /100    Absolute Neutrophils 5.3 1.6 - 8.3 10e3/uL    Absolute Lymphocytes 2.5 0.8 - 5.3 10e3/uL    Absolute Monocytes 0.4 0.0 - 1.3 10e3/uL    Absolute Eosinophils 0.0 0.0 - 0.7 10e3/uL    Absolute Basophils 0.0 0.0 - 0.2 10e3/uL    Absolute Immature Granulocytes 0.0 <=0.0 10e3/uL    Absolute NRBCs 0.0 10e3/uL   Urine Drugs of Abuse Screen Panel 13     Status: Abnormal   Result Value Ref Range    Cannabinoids (66-knc-8-carboxy-9-THC) Not Detected Not Detected, Indeterminate    Phencyclidine Not Detected Not Detected, Indeterminate    Cocaine (Benzoylecgonine) Not Detected Not Detected, Indeterminate    Methamphetamine (d-Methamphetamine) Not Detected Not Detected, Indeterminate    Opiates (Morphine) Not Detected Not Detected, Indeterminate    Amphetamine (d-Amphetamine) Not Detected Not Detected, Indeterminate    Benzodiazepines (Nordiazepam) Detected (A) Not Detected, Indeterminate    Tricyclic Antidepressants (Desipramine) Not Detected Not Detected, Indeterminate    Methadone Not Detected Not Detected, Indeterminate    Barbiturates (Butalbital) Not Detected Not Detected, Indeterminate    Oxycodone Not Detected Not Detected, Indeterminate    Propoxyphene (Norpropoxyphene) Not Detected Not Detected, Indeterminate    Buprenorphine Not Detected Not Detected, Indeterminate   Asymptomatic COVID-19 Virus (Coronavirus) by PCR Nasopharyngeal     Status: Normal    Specimen: Nasopharyngeal; Swab   Result Value Ref Range    SARS CoV2 PCR Negative Negative    Narrative    Testing was performed using the Xpert Xpress SARS-CoV-2 Assay on the   Cepheid Gene-Xpert Instrument Systems. Additional information about   this Emergency Use Authorization (EUA) assay can be found via the Lab   Guide. This test should be ordered for the detection of SARS-CoV-2 in   individuals who meet SARS-CoV-2 clinical and/or  epidemiological   criteria. Test performance is unknown in asymptomatic patients. This   test is for in vitro diagnostic use under the FDA EUA for   laboratories certified under CLIA to perform high complexity testing.   This test has not been FDA cleared or approved. A negative result   does not rule out the presence of PCR inhibitors in the specimen or   target RNA in concentration below the limit of detection for the   assay. The possibility of a false negative should be considered if   the patient's recent exposure or clinical presentation suggests   COVID-19. This test was validated by [facility]. This laboratory is certified under the Clinical Laboratory Improve  ment Amendments (CLIA) as qualified to perform high complexity testing.   Urine Drugs of Abuse Screen     Status: Abnormal    Narrative    The following orders were created for panel order Urine Drugs of Abuse Screen.  Procedure                               Abnormality         Status                     ---------                               -----------         ------                     Urine Drugs of Abuse Scr...[[id number]]  Abnormal            Final result                 Please view results for these tests on the individual orders.          Assessment/Impression: This is a 46 year old yo male with a history of schizoaffective disorder- bipolar type and alcohol abuse. Mother brought him to the ED due to increase in paranoia and hallucinations. Reported that he had been hiding in their basement in the dark for past 2 weeks, believes that there are people after him. Patient reports that he has been compliant with his Zyprexa, though mother indicates that he has not been taking it. It does appear that he has remained relatively stable on Zyprexa for at least the last 10 years, so it is likely that he was not as compliant as he reports. He did recently move to his mother's home near [place], [place] in the past 2 months from  [place] where he had been homeless. Current psych provider is still in [place] and per documentation it appears that he currently has a prescription for Zyprexa waiting for him to  at [facility]. Will ensure that he is able to obtain his prescriptions at discharge, whether they be mailed or transferred to pharmacy closer to his current residence. There is also a documented history of alcohol use disorder, though patient reports having only one drink in the last 3 weeks. I believe that he may have had a Rule 25 completed recently, will see if this can be followed up on.     Educated regarding medication indications, risks, benefits, side effects, contraindications and possible interactions. Verbally expressed understanding.     DX:  Schizoaffective disorder- bipolar type (by history)  Alcohol use disorder, moderate       Plan:  Admit to Unit: [address] [facility]  Attending: Hemalatha Serrato CNP  Patient is: Voluntary  Other routine labs were notable for utox +benzos  Monitor for target symptoms: decrease in paranoia and hallucinations  Provide a safe environment and therapeutic milieu.   Continue Zyprexa 30 mg at bedtime  Possibly completed Rule 25 recently?- unclear whether he wishes to pursue treatment      Anticipated length of stay: 3-5 days       FERDINAND Black, CNP

## 2021-08-19 NOTE — PLAN OF CARE
Problem: Behavioral Health Plan of Care  Goal: Patient-Specific Goal (Individualization)  Description: Pt will sleep at least 6-8 hours per NOC shift.  Pt will eat at least 50% of meals.  Pt will be medication compliant.  Pt will follow treatment team recommendations,  Pt will attend at least 50% of groups.      Outcome: Improving     Problem: Thought Process Alteration  Goal: Optimal Thought Clarity  Description: Pt will report no paranoid statements by discharge.  Pt will be able to maintain a linear and reality based conversation by discharge    Outcome: Improving   Face to face shift report received from bob,rn Rounding completed, pt observed.    Pt has flat affect. Is pleasant and cooperative with staff, and taking medication.  Pt denies pain, hallucination or delusions.  Stayed in his room the entire evening.  Encouraged pt to let staff know of any needs.  Face to face report will be communicated to oncoming RN.    Alyssia URIBE RN  8/19/2021  9:14 PM

## 2021-08-19 NOTE — PLAN OF CARE
"Face to face shift report received from Matti HAMLIN RN. Rounding completed, pt observed.    Problem: Behavioral Health Plan of Care  Goal: Patient-Specific Goal (Individualization)  Description: Pt will sleep at least 6-8 hours per NOC shift.  Pt will eat at least 50% of meals.  Pt will be medication compliant.  Pt will follow treatment team recommendations,  Pt will attend at least 50% of groups.      Outcome: Improving  Note: Shift Summery:  Patient in bed at the start of this shift.  Patient cooperative with nurse.   States that he has been staying with his mother in [place] and wants help with getting into treatment.  Patient states he is \"Bipolar\" and insisted he has been compliant with medications at home although this nurse had a conversation with his mother via telephone that patient is not compliant with meds and that he has an alcohol issue and he needs help.  Denies hallucinations or delusions.    Problem: Thought Process Alteration  Goal: Optimal Thought Clarity  Description: Pt will report no paranoid statements by discharge.  Pt will be able to maintain a linear and reality based conversation by discharge    Outcome: Improving     Problem: Depression  Goal: Improved Mood  Description: Pt will report manageable levels of depression and anxiety by discharge.  Pt will contract for safety while on the unit.  Pt will report worsening of symptoms and any suicidal ideation.  Outcome: No Change   Face to face report will be communicated to oncoming RN.    Ashley Hunter RN  8/19/2021      "

## 2021-08-19 NOTE — PLAN OF CARE
Problem: Behavioral Health Plan of Care  Goal: Patient-Specific Goal (Individualization)  Description:  pt will  Note: 1. Sleep at least 6-8 hours per night  2. Eat at least 50% of meals   3. Attend at least 50% of group therapy sessions daily  4. Cooperate with treatment team recommendations  5. Take prescribed medications

## 2021-08-19 NOTE — SAFE
"Andre Bustos  August 18, 2021  SAFE Note    Critical Safety Issues: Patient has some anxiety about inpatient mental health placement.  He becomes anxious at thoughts and discussions regarding interactions with other people.  Thus far however pt has been cooperative and redirectable.     **Please note that CareEverywhere shows a hx         of seizures thought to be from alcohol withdrawal.**        Current Suicidal Ideation/Self-Injurious Concerns/Methods: None - N/A      Current or Historical Inappropriate Sexual Behavior: Unknown      Current or Historical Aggression/Homicidal Ideation: Impaired Self-Control      Triggers: Patient reportedly has been having visual hallucinations-- he believes there are others who wish to do him harm; are \"out to get him.\"  He becomes paranoid and anxious when he believes he is being \"controlled\" or \"railroaded.\"              Updated care team: Yes:     For additional details see full LMHP assessment.       Nurys Obregon, LICSW          "

## 2021-08-19 NOTE — H&P
[facility]    History and Physical  Medical Services       Date of Admission:  8/18/2021  Date of Service (when I saw the patient): 08/19/21    Assessment & Plan     Principal Problem:    Delusions (H)    Active Medical Problems:    Paranoia (H)    ED course- covid negative, cmp, CBC unremarkable, UA- no infection.     Pt medically stable, no acute medical concerns. No chronic medical problems identified. Will sign off. Please consult for any new medical issues or concerns.       Code Status: Full Code    Veronica Church CNP    Primary Care Physician   No primary care provider on file.    Chief Complaint   Psych evaluation     History is obtained from the patient and medical     History of Present Illness   (per ED) Andre Bustos is a 46 year old male who resented to the emergency department ambulatory along with mother and father for evaluation of worsening depression, delusions, paranoia, and what sounds to be auditory hallucinations.  The patient used to live in the [facility] and was homeless as well as an intermittent drug abuse and alcohol abuser.  Recently living with his parents since May in [facility].  He is supposed to take Zyprexa daily for underlying psychiatric disorder.  Denies homicidal or suicidal ideation.  Denies any recent drug abuse.  Denies any recent alcohol abuse.    Past Medical History    I have reviewed this patient's medical history and updated it with pertinent information if needed.   No past medical history on file.    Past Surgical History   I have reviewed this patient's surgical history and updated it with pertinent information if needed.  No past surgical history on file.    Prior to Admission Medications   Prior to Admission Medications   Prescriptions Last Dose Informant Patient Reported? Taking?   Lidocaine (LIDOCARE) 4 % Patch   Yes Yes   Sig: Place 1 patch onto the skin every 24 hours   OLANZapine (ZYPREXA) 15 MG tablet   Yes Yes   Sig: Take 30 mg by  mouth daily    ibuprofen (ADVIL/MOTRIN) 600 MG tablet   Yes Yes   Sig: Take 600 mg by mouth 3 times daily as needed   nicotine polacrilex (NICORETTE) 4 MG gum   Yes Yes   Sig: Take 4 mg by mouth as needed      Facility-Administered Medications: None     Allergies   No Known Allergies    Social History   I have reviewed this patient's social history and updated it with pertinent information if needed. Andre Bustos      Family History   I have reviewed this patient's family history and updated it with pertinent information if needed.   No family history on file.    Review of Systems   CONSTITUTIONAL:  negative  EYES:  negative  HEENT:  negative  RESPIRATORY:  negative  CARDIOVASCULAR:  negative  GASTROINTESTINAL:  negative  GENITOURINARY:  negative  INTEGUMENT/BREAST:  negative  HEMATOLOGIC/LYMPHATIC:  negative  ALLERGIC/IMMUNOLOGIC:  negative  ENDOCRINE:  negative  MUSCULOSKELETAL:  negative  NEUROLOGICAL:  negative    Physical Exam   Temp: 97.5  F (36.4  C) Temp src: Temporal BP: 106/75 Pulse: 97   Resp: 18 SpO2: 98 % O2 Device: None (Room air)    Vital Signs with Ranges  Temp:  [97.5  F (36.4  C)-98.3  F (36.8  C)] 97.5  F (36.4  C)  Pulse:  [] 97  Resp:  [1-18] 18  BP: (106-117)/(75-83) 106/75  SpO2:  [97 %-99 %] 98 %  151 lbs 12.8 oz    Constitutional: sleeping, wakes to name, cooperative, no apparent distress, disheveled, vitals stable   Eyes: Lids and lashes normal, pupils equal, round and reactive to light, extra ocular muscles intact, sclera clear, conjunctiva normal  ENT: Normocephalic, without obvious abnormality, atraumatic, external ears without lesions, oral pharynx with moist mucous membranes, no erythema or exudates  Hematologic / Lymphatic: no cervical lymphadenopathy  Respiratory: No increased work of breathing, good air exchange, clear to auscultation bilaterally, no crackles or wheezing  Cardiovascular: Normal apical impulse, regular rate and rhythm, normal S1 and S2, no S3 or S4, and no  murmur noted  GI: normal bowel sounds, soft, non-distended, non-tender, no masses palpated, no hepatosplenomegally  Genitounirinary: deferred  Skin: normal skin color, texture, turgor, no redness, warmth, or swelling and no rashes  Musculoskeletal: There is no redness, warmth, or swelling of the joints.  Full range of motion noted.    Neurologic: sleeping, wakes to name, oriented to name, place.  Neuropsychiatric: General: normal, calm and normal eye contact    Data   Data reviewed today:   Recent Labs   Lab 08/18/21  1500   WBC 8.2   HGB 14.9   MCV 92         POTASSIUM 4.0   CHLORIDE 110*   CO2 25   BUN 13   CR 1.03   ANIONGAP 4   CAROLINA 9.4   GLC 87   ALBUMIN 4.2   PROTTOTAL 7.7   BILITOTAL 0.5   ALKPHOS 63   ALT 18   AST 8       No results found for this or any previous visit (from the past 24 hour(s)).

## 2021-08-19 NOTE — ED NOTES
"8/18/2021  Andre Bustos 1975     [facility] Crisis Assessment:    Started at: 1800   Completed at: 1831  Patient was assessed via virtually ([employer] cart or other teleconferencing device).    Chief Complaint and History of Presenting Problem:  Patient is a 46 year old  male who presented to the ED by Self and Family/Friends related to concerns for increasing paranoia, alcohol use, isolation, and concerns about visual hallucinations.     Psychotherapy techniques or interventions utilized throughout assessment include: Establishing rapport, Active listening, Assess dimensions of crisis, Motivational Interviewing and Other: Clarifying and reframing    Biopsychosocial Background and Demographic Information  Patient had been living in [place].  He was homeless.  In May, he called his mother and step father and asked them if he could live with them.   He is on Social Security disability for mental health issues.  He is quite isolated.  He reports not working.  He wants to get his own apartment.   He says too he's been thinking of looking up his biological father with whom he has not had recent contact.  His mother says patient not capable of living on his own.  Mother says bio father has schizophrenia and pt cannot live with father.      Patient adds that he has had \"problems\" with some people from [place].  He says they pose no current danger to him.  They have been sending text messages through his phone however.  He does not know who they are.     Mental Health History   Patient Care Everywhere chart identifies historical diagnoses of Schizoaffective disorder, MDD and AUD . At baseline, patient describes their mental health symptoms as \"problematic\" recently.  He is somewhat evasive with specifics, however.  He mentions anxiety and depression.  It appears however that he has been increasingly paranoid, drinking, isolating and has been \"missing\" medications \"intermittently.\"     The patient has had seizures in " "the past according to Earl.  Notes indicate these are thought to be ETOH withdrawal related     Mental Health History (prior psychiatric hospitalizations, civil commitments, programmatic care, etc):    Patient has had long engagement with Mayank Lees in [place] ([facility]).   He was hospitalized in 2005 for mental health.  According to pt's mother, pt had a telemedicine visit with them 1.5 weeks ago.    Family Mental and Chemical Health History: patient's biological has schizophrenia.  No other information is known    Current and Historic Psychotropic Medications: Zyprea   Medication Adherent: No and Patient misses medications \"every once in awhile, sometimes, every now and again\"   Recent medication changes? Yes and mother mentioned a telemedicine visit recently and a medication change.  Specifics unknown     Relevant Medical Concerns  Patient identifies concerns with completing ADLs? No  Patient can ambulate independently? Yes  Other medical health concerns? Yes and History of seizures  History of concussion or TBI? No     Trauma History   Physical, Emotional, or Sexual abuse: No and None reported by patient   Loss of a friend or family member to suicide: No  Other identified traumatic event or significant stressor: No    Substance Use History and Treatments  Patient has been to CD treatment, he says 3x outpatient    Drug screen/BAL/Breathalyzer Completed? Yes  Results: positive for Benzodiazepine     History of Suicidal Ideation, Suicide Attempts, Non-Suicidal Self Injury, and Risk Formulation:   Details of Current Ideation, Attempt(s), Plan(s): Denies   Risk factors: disengagement with or distrust of medical/mental health care and history of or current substance use.   Protective factors: Lives with parents; has hx with Mayank Lees in [place] strong bond to family/friends.  History and Prior Methods of Self-injury: None known.  Mother say she thinks there was but can provide no " "specifics  History of Suicide Attempts:none known     ESS-6  1.a. Over the past 2 weeks, have you had thoughts of killing yourself? No   1.b. Have you ever attempted to kill yourself and, if yes, when did this last happen? No  2. Recent or current suicide plan? No  3. Recent or current intent to act on ideation? No  4. Lifetime psychiatric hospitalization? Yes  5. Pattern of excessive substance use? Yes  6. Current irritability, agitation, or aggression? No  ESS-6 Score: 2      Other Risk Areas  Aggressive/assumptive/homicidal risk factors: No   Sexually inappropriate behavior? No      Vulnerability to sexual exploitation? No     Clinical Presentation and Current Symptoms   Patient downplays and minimizes mother's grave concerns.  He states he wants help with his mental health but he is evasive and conditional.      Patient is cooperative with interview.  This  believes he is more honest with ED staff and  than with his mother regarding what he wants in terms of mental health treatment. He told his mother that he lied in his Rule 25 assessment.  He told this  that he isn't a \"really bad\" alcoholic like the people he knows.  He does not need CD treatment, he says.      The patient shows some signs of paranoia when discussing the people in [place] whom he says are out to hurt him.  He does not know who they are but they are sending messages through his phone.  He also shows paranoia when inpatient placement is discussed.  He becomes defensive and shows some fear.   He states he only wants a private room and he will not participate in groups.  He says too he will only stay for \"a couple of days\"  Voluntarily in the hospital.  He worried he was being \"railroaded.\"   This  tried to assure him that we would not be talking with him if his thoughts, feelings and opinions were not being taken into consideration.  It took some time to convince him of this.      The patient denies having auditory " "hallucinations although his mother says he \"sees people\" through the windows at home whom he thinks are going to hurt him and burn the family house down.  The patient dismisses this.     He is able to have a linear conversation. He shows no signs of aggression at this time.  He is anxious but had turned down a PRN for anxiety offered to him earlier in night at ED.   He states he has missed some of his meds.  Again, he seems to be working at down-playing and minimizing his mental health challenges although he admits he has been having \"some challenges.\"  Patient says he set up an appointment at [facility] for a therapist in late September.  He likes the provider and wants to stay with them.     Patient's mother is very afraid for her son and she says of what \"may\" happen to parents and their home.  She is having a difficult time with the voluntary nature of the inpatient placement and wants the hospital to commit pt.  Pt says he feels bad about the stress he is putting on his parents and their marriage.      Attention, Hyperactivity, and Impulsivity: No   Anxiety:Yes: Generalized Symptoms: Other: social anxiety     Behavioral Difficulties: Yes: Withdrawal/Isolation   Mood Symptoms: Yes: Impaired concentration and Sad, depressed mood    Appetite: Yes: Other: Patient denies but mother said pt not sleeping    Feeding and Eating: Yes: Other: pt denies but mother says pt not eating   Interpersonal Functioning: Yes: Cognitive Distortions  Learning Disabilities/Cognitive/Developmental Disorders: No   General Cognitive Impairments: No  If yes, see completed Mini-Cog Assessment below.  Sleep: Yes: Other: Patient says he sleeps fine; mother says he does not    Psychosis: Yes: Paranoia    Trauma: No       Mental Status Exam:  Affect: Appropriate  Appearance: Appropriate   Attention Span/Concentration: Attentive    Eye Contact: Engaged  Fund of Knowledge: Appropriate   Language /Speech Content: Fluent  Language /Speech " "Volume: Normal   Language /Speech Rate/Productions: Normal   Recent Memory: Variable  Remote Memory: Variable  Mood: Anxious   Orientation:   Person: Yes   Place: Yes  Time of Day: Yes   Date: Yes   Situation (Do they understand why they are here?): Yes   Psychomotor Behavior: Normal   Thought Content: Clear  Thought Form: Intact    Screeners (Remove if not applicable - complete as standard for individuals 65+)  Mini-Cog Assessment  Instructions:  1. Ask the patient to listen carefully and remember three unrelated words (ex. Table, apple, rosetta).  2. Ask the patient to draw a clock: first the [place], then the numbers, then the hands at a specific time (ex. 11:10am).  3. Ask the patient to repeat the three words.    Number of Words Recalled:   Clock-Drawing Test:    Mini-Cog Total Score:   Details:     Current Providers and Contact Information   Patient is his own legal guardian.     Primary Care Provider: No  Psychiatrist: Yes, \"Ritika\" at [facility]  Therapist: Yes, First appointment is Late September 2021.  NOT YET SEEN  : No  CTSS or ARMHS: No  ACT Team: No  Other: No    Has an DEBBIE been signed? No ; For ; By: ; Relationship to patient .     Clinical Summary and Recommendations  Clinical summary of assessment (include strengths, protective factors, community resources, and assessment of vulnerability/risk): Patient seems to want to placate mother, not a reliable narrator; pt lives with parents; he has a hx with Mayank Lees and is comfortable with that provider.  Patient has a hx of alcohol dependence which he currently downplays.      Patient does not appear to be legally hold-able at this time.  He states he is willing to stay \"for a couple of days\" in the hospital voluntarily but says he wants a private room and will not participate in groups.  The patient is drinking and isolating.  He mentions that the drinking is interfering with his medication adherence.  He at this time however is only " "minimally receptive to clinical intervention.  His alcohol use is likely more problematic than he is willing to disclose at this time       Diagnosis with F Codes:  Schizoaffective Disorder F25.9; AUD f10.20; AUD with hx of withdrawal F10.23; MDD F33.9    Disposition  Attending provider, Kermit Greene consulted and does  agree with recommended disposition which includes Inpatient Mental Health. Patient agrees with recommended level of care. Patient is highly conditional about circumstances of \"voluntary\" hospitalization.  He seems to be wanting to placate his mother by agreeing to hospitalization.  Although he says he wants help, he says different things to staff than to his mother.  Provider Jc worked with [facility] and they will help patient as much as they are able at their unit there on a voluntary basis.       Details of final disposition include: Inpatient mental health . At [place], voluntary     If Inpatient, is patient admitted voluntary? Yes   Patient aware of potential for transfer if there is not appropriate placement? NA  Patient is willing to travel outside of the [facility] for placement? NA   Central Intake Notified? NA  If Discharging, what are follow up needs?       Duration of assessment time: 1.25 hrs    CPT code(s) utilized: 35905, up to 74 minutes      STEPHANIE Gary    "

## 2021-08-20 PROCEDURE — 124N000001 HC R&B MH

## 2021-08-20 PROCEDURE — 250N000013 HC RX MED GY IP 250 OP 250 PS 637: Performed by: NURSE PRACTITIONER

## 2021-08-20 PROCEDURE — 99232 SBSQ HOSP IP/OBS MODERATE 35: CPT | Performed by: NURSE PRACTITIONER

## 2021-08-20 RX ADMIN — NICOTINE POLACRILEX 2 MG: 2 GUM, CHEWING ORAL at 20:41

## 2021-08-20 RX ADMIN — NICOTINE POLACRILEX 2 MG: 2 GUM, CHEWING ORAL at 08:45

## 2021-08-20 RX ADMIN — OLANZAPINE 30 MG: 10 TABLET, FILM COATED ORAL at 20:41

## 2021-08-20 ASSESSMENT — ACTIVITIES OF DAILY LIVING (ADL)
DRESS: INDEPENDENT
HYGIENE/GROOMING: INDEPENDENT;PROMPTS
ORAL_HYGIENE: INDEPENDENT
DRESS: SCRUBS (BEHAVIORAL HEALTH);INDEPENDENT
HYGIENE/GROOMING: INDEPENDENT
ORAL_HYGIENE: INDEPENDENT;PROMPTS
LAUNDRY: UNABLE TO COMPLETE

## 2021-08-20 NOTE — PLAN OF CARE
"  Problem: Behavioral Health Plan of Care  Goal: Patient-Specific Goal (Individualization)  Description: Pt will sleep at least 6-8 hours per [facility] shift.  Pt will eat at least 50% of meals.  Pt will be medication compliant.  Pt will follow treatment team recommendations,  Pt will attend at least 50% of groups.      Outcome: No Change  Note: 15:30: Received end of shift report from DAVID Ramirez. Pt in room resting in bed awake.     21:00: Pt isolated self in room all shift, paranoid/withdrawn activity, denies all criteria, offers little communication with this writer et staff, compliant with staff requests et HS medication administration. Pt states, \"I think I'm discharging tomorrow either to my mom's or dad's, we'll see....\" Adequate food et fluid intake.    Face to face end of shift report communicated to on-coming [facility] staff.     Saranya Abdi RN  8/20/2021  10:42 PM               Problem: Behavioral Health Plan of Care  Goal: Optimized Coping Skills in Response to Life Stressors  Outcome: No Change     Problem: Thought Process Alteration  Goal: Optimal Thought Clarity  Description: Pt will report no paranoid statements by discharge.  Pt will be able to maintain a linear and reality based conversation by discharge    Outcome: Improving     Problem: Depression  Goal: Improved Mood  Description: Pt will report manageable levels of depression and anxiety by discharge.  Pt will contract for safety while on the unit.  Pt will report worsening of symptoms and any suicidal ideation.  Outcome: Improving     "

## 2021-08-20 NOTE — PLAN OF CARE
"Face to face shift report received from Matti HAMLIN RN. Rounding completed, pt observed.    Problem: Behavioral Health Plan of Care  Goal: Patient-Specific Goal (Individualization)  Description: Pt will sleep at least 6-8 hours per NOC shift.  Pt will eat at least 50% of meals.  Pt will be medication compliant.  Pt will follow treatment team recommendations,  Pt will attend at least 50% of groups.       Outcome: Improving  Note: Shift Summery:  Patient was lying in bed at the start of this shift.  Patient remains isolative and guarded but did answer questions more easily today.  Admitted that he had not been taking scheduled Zyprexa at home \"for awhile\".  Asking how long he will be in the hospital so referred to angel with NP.  Encouraged patient to shower and come out of his room.  Ptient statted that he did not like to go to groups.  Encouraged to at least shower and change scrubs.  Patient requested to know what belongings he had come in with so nurse reviewed belongings lists with patient and nurse explained explained the disposition of each.  Remains isolative but cooperative.    Problem: Thought Process Alteration  Goal: Optimal Thought Clarity  Description: Pt will report no paranoid statements by discharge.  Pt will be able to maintain a linear and reality based conversation by discharge    Outcome: Improving   Face to face report will be communicated to oncoming RN.    Ashley Hunter RN  8/20/2021      "

## 2021-08-20 NOTE — PLAN OF CARE
Problem: Behavioral Health Plan of Care  Goal: Patient-Specific Goal (Individualization)  Description: Pt will sleep at least 6-8 hours per NOC shift.  Pt will eat at least 50% of meals.  Pt will be medication compliant.  Pt will follow treatment team recommendations,  Pt will attend at least 50% of groups.      Outcome: Improving     Face to face shift report received from RN. Rounding completed, pt observed. Client rested in room for 7 hours with eyes closed and respirations noted. Face to face report will be communicated to oncoming RN.    Matti Lorenzo RN  8/20/2021  6:35 AM

## 2021-08-20 NOTE — PLAN OF CARE
Met with patient with  to discuss the option of a chemical health assessment. Patient appears reluctant and unsure whether he'd like to complete one at this time. This writer informed patient that if he would like to complete an assessment, I have availability Monday afternoon. The patient reports he is unsure whether he will still be here at that time but agreed to meet with this writer if he has not yet discharged. Patient did state that he is aware that he can have an assessment done through [facility] on an outpatient basis after discharge from our unit.

## 2021-08-20 NOTE — PROGRESS NOTES
"[facility]  Psychiatric Progress Note      Impression:   This is a 46 year old yo male with a history of schizoaffective disorder- bipolar type and alcohol abuse. Mother brought him to the ED due to increase in paranoia and hallucinations. Reported that he had been hiding in their basement in the dark for past 2 weeks, believes that there are people after him. Patient reports that he has been compliant with his Zyprexa, though mother indicates that he has not been taking it. It does appear that he has remained relatively stable on Zyprexa for at least the last 10 years, so it is likely that he was not as compliant as he reports. He did recently move to his mother's home near [place], [place] in the past 2 months from [place] where he had been homeless. Current psych provider is still in [place] and per documentation it appears that he currently has a prescription for Zyprexa waiting for him to  at [facility]. Will ensure that he is able to obtain his prescriptions at discharge, whether they be mailed or transferred to pharmacy closer to his current residence.        Interim History:   Andre is awake resting in bed when I see him today. He is much easier to engage in conversation today. He does admit that he ran out of his Zyprexa recently, states that he has been on Zyprexa for at least the last 15 years and has always found it helpful. Has now received it the last 2 nights and thought process does seem clearer today. He does state that when he is off of medication he does become more paranoid. He has remained isolative to his room, though states that he has no interest in attending groups or being \"around any of these people\". He does ask whether he would be able to discharge over the weekend. Discussed that he is voluntary and that if he felt safe we could look at discharge in the next day or two. He states that he still works with his psychiatrist in [place], has been doing appointments by phone. He also " "indicates that his pharmacy is through [facility], either [place] or [place], though he then has to go pick them up. Will need to ensure he is able to obtain medications if discharged over the weekend.         Educated regarding medication indications, risks, benefits, side effects, contraindications and possible interactions. Verbally expressed understanding.        Diagnoses:   Schizoaffective disorder- bipolar type (by history)  Alcohol use disorder, moderate        Attestation:  Patient has been seen and evaluated by me,  Hemalatha Serrato NP      The patient's care was discussed with the treatment team and chart notes were reviewed.         Medications:     Current Facility-Administered Medications Ordered in Epic   Medication Dose Route Frequency Last Rate Last Admin     acetaminophen (TYLENOL) tablet 650 mg  650 mg Oral Q4H PRN         alum & mag hydroxide-simethicone (MAALOX) suspension 30 mL  30 mL Oral Q4H PRN         hydrOXYzine (ATARAX) tablet 25-50 mg  25-50 mg Oral Q4H PRN         melatonin tablet 3 mg  3 mg Oral At Bedtime PRN         nicotine (NICORETTE) gum 2 mg  2 mg Buccal Q1H PRN   2 mg at 08/20/21 0845     OLANZapine (zyPREXA) tablet 30 mg  30 mg Oral QPM   30 mg at 08/19/21 2003     QUEtiapine (SEROquel) tablet  mg   mg Oral BID PRN         senna-docusate (SENOKOT-S/PERICOLACE) 8.6-50 MG per tablet 1 tablet  1 tablet Oral BID PRN         No current [facility]-ordered outpatient medications on file.            10 point ROS- denies acute concerns       Allergies:   No Known Allergies         Psychiatric Examination:   /72 (BP Location: Right arm)   Pulse 77   Temp 98  F (36.7  C) (Temporal)   Resp 16   Ht 1.778 m (5' 10\")   Wt 68.9 kg (151 lb 12.8 oz)   SpO2 99%   BMI 21.78 kg/m    Weight is 151 lbs 12.8 oz  Body mass index is 21.78 kg/m .    Appearance:  awake, alert, adequately groomed, dressed in hospital scrubs and appeared as age stated  Attitude:  cooperative, still a bit " guarded  Eye Contact:  good  Mood:  better, some anxiety  Affect:  full range  Speech:  clear, coherent  Psychomotor Behavior:  no evidence of tardive dyskinesia, dystonia, or tics  Thought Process:  linear  Associations:  no loose associations  Thought Content:  no evidence of suicidal ideation or homicidal ideation, denies any hallucinations today  Insight:  limited  Judgment:  limited  Oriented to:  time, person, and place  Attention Span and Concentration:  fair  Recent and Remote Memory:  intact  Fund of Knowledge: appropriate  Muscle Strength and Tone: normal  Gait and Station: Normal           Labs:   No results found for this or any previous visit (from the past 24 hour(s)).      BEHAVIORAL TEAM DISCUSSION    Progress: Improving. Restarting Zyprexa, thought process much clearer today. Denying any hallucinations.    Continued Stay Criteria/Rationale: restart zyprexa to treat psychosis, paranoia    Medical/Physical: none acute  Precautions:   Falls precaution?: No  Behavioral Orders   Procedures     Code 1 - Restrict to Unit     Routine Programming     As clinically indicated     Status 15     Every 15 minutes.     Plan:     Continue Zyprexa 30 mg at bedtime  Likely discharge in next day or two          Participants: FERDINAND Black, CNP, Dr. Morales, SW, OT, Nursing

## 2021-08-21 PROCEDURE — 99232 SBSQ HOSP IP/OBS MODERATE 35: CPT | Performed by: NURSE PRACTITIONER

## 2021-08-21 PROCEDURE — 250N000013 HC RX MED GY IP 250 OP 250 PS 637: Performed by: NURSE PRACTITIONER

## 2021-08-21 PROCEDURE — 124N000001 HC R&B MH

## 2021-08-21 RX ORDER — NICOTINE 21 MG/24HR
1 PATCH, TRANSDERMAL 24 HOURS TRANSDERMAL DAILY
Status: DISCONTINUED | OUTPATIENT
Start: 2021-08-21 | End: 2021-09-01

## 2021-08-21 RX ADMIN — OLANZAPINE 30 MG: 10 TABLET, FILM COATED ORAL at 20:22

## 2021-08-21 RX ADMIN — NICOTINE 1 PATCH: 14 PATCH, EXTENDED RELEASE TRANSDERMAL at 15:04

## 2021-08-21 ASSESSMENT — ACTIVITIES OF DAILY LIVING (ADL)
HYGIENE/GROOMING: INDEPENDENT;SHOWER;PROMPTS
HYGIENE/GROOMING: INDEPENDENT
ORAL_HYGIENE: INDEPENDENT
LAUNDRY: UNABLE TO COMPLETE
LAUNDRY: UNABLE TO COMPLETE
DRESS: INDEPENDENT;SCRUBS (BEHAVIORAL HEALTH)
DRESS: SCRUBS (BEHAVIORAL HEALTH);INDEPENDENT
ORAL_HYGIENE: INDEPENDENT

## 2021-08-21 NOTE — PROGRESS NOTES
"[facility]  Psychiatric Progress Note      Impression:   This is a 46 year old yo male with a history of schizoaffective disorder- bipolar type and alcohol abuse. Mother brought him to the ED due to increase in paranoia and hallucinations. Reported that he had been hiding in their basement in the dark for past 2 weeks, believes that there are people after him. Patient reports that he has been compliant with his Zyprexa, though mother indicates that he has not been taking it. It does appear that he has remained relatively stable on Zyprexa for at least the last 10 years, so it is likely that he was not as compliant as he reports. He did recently move to his mother's home near [place], [place] in the past 2 months from [place] where he had been homeless. Current psych provider is still in [place] and per documentation it appears that he currently has a prescription for Zyprexa waiting for him to  at [facility]. Will ensure that he is able to obtain his prescriptions at discharge, whether they be mailed or transferred to pharmacy closer to his current residence.        Interim History:   Andre is awake resting in bed when I see him today. Offers little in conversation besides reporting that he is \"fine\" today and plans on likely discharging on Monday. He does admit to some continued paranoia, thoughts states \"the meds help\". Had not been taking them prior to admission. He has been isolative to his room, encouraged to come out to the lounge though he states \"I just don't like being around other people\". Did discuss completing a Rule 25 on Monday, though he shrugs his shoulders and states \"I don't know\".        Educated regarding medication indications, risks, benefits, side effects, contraindications and possible interactions. Verbally expressed understanding.        Diagnoses:   Schizoaffective disorder- bipolar type (by history)  Alcohol use disorder, moderate        Attestation:  Patient has been seen and " "evaluated by me,  Hemalatha Serrato NP      The patient's care was discussed with the treatment team and chart notes were reviewed.         Medications:     Current Facility-Administered Medications Ordered in Epic   Medication Dose Route Frequency Last Rate Last Admin     acetaminophen (TYLENOL) tablet 650 mg  650 mg Oral Q4H PRN         alum & mag hydroxide-simethicone (MAALOX) suspension 30 mL  30 mL Oral Q4H PRN         hydrOXYzine (ATARAX) tablet 25-50 mg  25-50 mg Oral Q4H PRN         melatonin tablet 3 mg  3 mg Oral At Bedtime PRN         nicotine (NICORETTE) gum 2 mg  2 mg Buccal Q1H PRN   2 mg at 08/20/21 0845     OLANZapine (zyPREXA) tablet 30 mg  30 mg Oral QPM   30 mg at 08/19/21 2003     QUEtiapine (SEROquel) tablet  mg   mg Oral BID PRN         senna-docusate (SENOKOT-S/PERICOLACE) 8.6-50 MG per tablet 1 tablet  1 tablet Oral BID PRN         No current [facility]-ordered outpatient medications on file.            10 point ROS- denies acute concerns       Allergies:   No Known Allergies         Psychiatric Examination:   /75   Pulse 84   Temp 97.1  F (36.2  C) (Tympanic)   Resp 16   Ht 1.778 m (5' 10\")   Wt 68.9 kg (151 lb 12.8 oz)   SpO2 97%   BMI 21.78 kg/m    Weight is 151 lbs 12.8 oz  Body mass index is 21.78 kg/m .    Appearance:  awake, alert, adequately groomed, dressed in hospital scrubs and appeared as age stated  Attitude: cooperative, guarded  Eye Contact:  good  Mood: \"fine\", does seem anxious  Affect: blunted  Speech:  clear, coherent  Psychomotor Behavior:  no evidence of tardive dyskinesia, dystonia, or tics  Thought Process:  linear  Associations:  no loose associations  Thought Content:  no evidence of suicidal ideation or homicidal ideation, denies any hallucinations today, admits to mild paranoia  Insight:  limited  Judgment:  limited  Oriented to:  time, person, and place  Attention Span and Concentration:  fair  Recent and Remote Memory:  intact  Fund of Knowledge: " appropriate for education  Muscle Strength and Tone: normal  Gait and Station: Normal           Labs:   No results found for this or any previous visit (from the past 24 hour(s)).      BEHAVIORAL TEAM DISCUSSION    Progress: Improving. Restarting Zyprexa, thought process much clearer today. Denying any hallucinations. Has been isolative to room, likely some ongoing paranoia    Continued Stay Criteria/Rationale: restart zyprexa to treat psychosis, paranoia    Medical/Physical: none acute  Precautions:   Falls precaution?: No  Behavioral Orders   Procedures     Code 1 - Restrict to Unit     Routine Programming     As clinically indicated     Status 15     Every 15 minutes.     Plan:     Continue Zyprexa 30 mg at bedtime  Recommend Rule 25, though unclear if patient plans on going through with this  Likely discharge early this week          Participants: FERDINAND Black, CNP, Nursing

## 2021-08-21 NOTE — PLAN OF CARE
"  Problem: Behavioral Health Plan of Care  Goal: Patient-Specific Goal (Individualization)  Description: Pt will sleep at least 6-8 hours per NOC shift.  Pt will eat at least 50% of meals.  Pt will be medication compliant.  Pt will follow treatment team recommendations,  Pt will attend at least 50% of groups.  Outcome: Improving     Pt in bed this shift, pt denies pain, SI, HI and hallucinations. Pt reports depression at 4/10 and anxiety. Pt states that his symptoms are manageable and that he plans on leaving on Monday, pt repeated \"I'm thinking of leaving on Monday\" 4 times within a 15 minute conversation. Pt refused to get up for group or meals stating that the staff brings him his tray.     Pt was encouraged to get up and get his tray. .nursing offered to walk to the lounge with him and get his tray even if he wanted to eat in his room. Pt did get out of bed and walked to his door but stopped and refused to walk into the pierce. Pt stated that he couldn't leave the room but it was ok because he had chips. Nursing did bring pt his food to his room. Pt did comment that there are things to be worried about in the lounge.     Pt's mom called to get an update on pt';s status. Pt's mom told nursing that she and her  can not allow pt to return home unless he gets help with his alcoholism and mental health. Parents stated that they are concerned that pt would hurt himself or them.      Problem: Thought Process Alteration  Goal: Optimal Thought Clarity  Description: Pt will report no paranoid statements by discharge.  Pt will be able to maintain a linear and reality based conversation by discharge    Outcome: Improving     Problem: Depression  Goal: Improved Mood  Description: Pt will report manageable levels of depression and anxiety by discharge.  Pt will contract for safety while on the unit.  Pt will report worsening of symptoms and any suicidal ideation.  Outcome: Improving     Face to face end of shift report " communicated to evening shift RN.    Rosalina Cantu RN  8/21/2021  8:13 AM

## 2021-08-21 NOTE — PLAN OF CARE
Problem: Behavioral Health Plan of Care  Goal: Patient-Specific Goal (Individualization)  Description: Pt will sleep at least 6-8 hours per NOC shift.  Pt will eat at least 50% of meals.  Pt will be medication compliant.  Pt will follow treatment team recommendations,  Pt will attend at least 50% of groups.      Outcome: Improving     Face to face shift report received from RN. Rounding completed, pt observed. Client rested in room with eyes closed and respirations noted for 7 hours. Face to face report will be communicated to oncoming RN.    Matti Lorenzo RN  8/21/2021  6:13 AM

## 2021-08-22 PROCEDURE — 124N000001 HC R&B MH

## 2021-08-22 PROCEDURE — 250N000013 HC RX MED GY IP 250 OP 250 PS 637: Performed by: NURSE PRACTITIONER

## 2021-08-22 RX ADMIN — OLANZAPINE 30 MG: 10 TABLET, FILM COATED ORAL at 20:16

## 2021-08-22 RX ADMIN — NICOTINE 1 PATCH: 14 PATCH, EXTENDED RELEASE TRANSDERMAL at 08:59

## 2021-08-22 ASSESSMENT — ACTIVITIES OF DAILY LIVING (ADL)
ORAL_HYGIENE: INDEPENDENT
DRESS: SCRUBS (BEHAVIORAL HEALTH);INDEPENDENT
HYGIENE/GROOMING: INDEPENDENT
ORAL_HYGIENE: INDEPENDENT
LAUNDRY: UNABLE TO COMPLETE
LAUNDRY: UNABLE TO COMPLETE
HYGIENE/GROOMING: INDEPENDENT
DRESS: INDEPENDENT;SCRUBS (BEHAVIORAL HEALTH)

## 2021-08-22 NOTE — PLAN OF CARE
"  Problem: Behavioral Health Plan of Care  Goal: Patient-Specific Goal (Individualization)  Description: Pt will sleep at least 6-8 hours per NOC shift.  Pt will eat at least 50% of meals.  Pt will be medication compliant.  Pt will follow treatment team recommendations,  Pt will attend at least 50% of groups.      Outcome: No Change  Note: Patient denies SI, HI, pain.  His affect is flat.  He offers little information during assessment. He appears preoccupied as his responses are highly delayed. At times he doesn't answer assessment questions and questions have to be repeated.  He is withdrawn and isolative to his room this entire shift.  He would not come out of his room to get meal tray. Staff brought tray to him.  His eye contact is intermittent during assessment.  Cooperative with scheduled HS Zyprexa. When patient was asked to identify his last name and  to receive medications, he appeared paranoid.  He did not answer despite being asked multiple times, instead he nodded his head up and down.  When asked a 4th time patient states \"I don't know.  What does the computer say?\"  He eventually did offer his last name and .         Problem: Thought Process Alteration  Goal: Optimal Thought Clarity  Description: Pt will report no paranoid statements by discharge.  Pt will be able to maintain a linear and reality based conversation by discharge    Outcome: No Change  Note: Patient appears preoccupied.      Problem: Depression  Goal: Improved Mood  Description: Pt will report manageable levels of depression and anxiety by discharge.  Pt will contract for safety while on the unit.  Pt will report worsening of symptoms and any suicidal ideation.  Outcome: No Change     "

## 2021-08-22 NOTE — PLAN OF CARE
Problem: Behavioral Health Plan of Care  Goal: Patient-Specific Goal (Individualization)  Description: Pt will sleep at least 6-8 hours per NOC shift.  Pt will eat at least 50% of meals.  Pt will be medication compliant.  Pt will follow treatment team recommendations,  Pt will attend at least 50% of groups.      Outcome: Improving   Face to face shift report received from rn. Rounding completed, pt observed. Client rested in room for 7 hours with eyes closed and respirations noted. Face to face report will be communicated to oncoming RN.    Matti Lorenzo RN  8/22/2021  6:25 AM

## 2021-08-22 NOTE — PLAN OF CARE
"  Problem: Behavioral Health Plan of Care  Goal: Patient-Specific Goal (Individualization)  Description: Pt will sleep at least 6-8 hours per NOC shift.  Pt will eat at least 50% of meals.  Pt will be medication compliant.  Pt will follow treatment team recommendations,  Pt will attend at least 50% of groups.  Outcome: Improving  Note:   Pt in bed at the start of the shift. Pt refuses to get out of bed to get his breakfast tray. Pt denies pain, SI, HI and hallucinations. Pt reports anxiety at 5/10 and depression at 5/10.  Pt states he plans on leaving tomorrow, pt not sure where he will be staying but states \"I will figure it out\".      Problem: Thought Process Alteration  Goal: Optimal Thought Clarity  Description: Pt will report no paranoid statements by discharge.  Pt will be able to maintain a linear and reality based conversation by discharge    Outcome: Improving     Problem: Depression  Goal: Improved Mood  Description: Pt will report manageable levels of depression and anxiety by discharge.  Pt will contract for safety while on the unit.  Pt will report worsening of symptoms and any suicidal ideation.  Outcome: Improving       Face to face end of shift report communicated to evening shift RN.     Rosalina Cantu RN  8/22/2021  10:01 AM       "

## 2021-08-22 NOTE — PLAN OF CARE
Problem: Behavioral Health Plan of Care  Goal: Patient-Specific Goal (Individualization)  Description: Pt will sleep at least 6-8 hours per NOC shift.  Pt will eat at least 50% of meals.  Pt will be medication compliant.  Pt will follow treatment team recommendations,  Pt will attend at least 50% of groups.      Outcome: No Change  Note: Patient is in bed and awake at start of shift.  Patient declines to come out of his room, even to get meal tray.  Patient appears highly anxious and paranoid when asked if he would be coming out in to the lounge to get his tray.  Patient asks if he can have his scheduled HS Zyprexa early.  Notified patient HS medications can be given at 2000.  He denies the need for PRN medication prior to that time.  Patient stated multiple times during conversation he would like to discharge tomorrow or Monday at the latest.  His affect is flat.  He offers minimal information during assessment.       Problem: Thought Process Alteration  Goal: Optimal Thought Clarity  Description: Pt will report no paranoid statements by discharge.  Pt will be able to maintain a linear and reality based conversation by discharge    Outcome: Improving  Note: Patient appears anxious, paranoid, guarded when encouraged to be out of his room.       Problem: Depression  Goal: Improved Mood  Description: Pt will report manageable levels of depression and anxiety by discharge.  Pt will contract for safety while on the unit.  Pt will report worsening of symptoms and any suicidal ideation.  Outcome: Improving  Note: Patient states he will not harm self or others while on the unit.

## 2021-08-23 PROCEDURE — 250N000013 HC RX MED GY IP 250 OP 250 PS 637: Performed by: NURSE PRACTITIONER

## 2021-08-23 PROCEDURE — 124N000001 HC R&B MH

## 2021-08-23 PROCEDURE — 99232 SBSQ HOSP IP/OBS MODERATE 35: CPT | Performed by: NURSE PRACTITIONER

## 2021-08-23 RX ADMIN — NICOTINE 1 PATCH: 14 PATCH, EXTENDED RELEASE TRANSDERMAL at 12:49

## 2021-08-23 RX ADMIN — OLANZAPINE 30 MG: 10 TABLET, FILM COATED ORAL at 20:06

## 2021-08-23 ASSESSMENT — ACTIVITIES OF DAILY LIVING (ADL)
HYGIENE/GROOMING: INDEPENDENT
DRESS: SCRUBS (BEHAVIORAL HEALTH)
HYGIENE/GROOMING: INDEPENDENT
ORAL_HYGIENE: INDEPENDENT
LAUNDRY: UNABLE TO COMPLETE
ORAL_HYGIENE: INDEPENDENT
DRESS: SCRUBS (BEHAVIORAL HEALTH);INDEPENDENT
LAUNDRY: UNABLE TO COMPLETE

## 2021-08-23 NOTE — PLAN OF CARE
Spoke to pt with [facility]. Pt stated they were no longer interested in the rule 25 assessment due to drinking not being an issue anymore. Pt is requesting transportation to either [place] or [place] as they are unsure yet where they want to go. Pt is not allowed back at mom's house. Pt states they will let this writer know when they decide.

## 2021-08-23 NOTE — PROGRESS NOTES
"[facility]  Psychiatric Progress Note      Impression:   This is a 46 year old yo male with a history of schizoaffective disorder- bipolar type and alcohol abuse. Mother brought him to the ED due to increase in paranoia and hallucinations. Reported that he had been hiding in their basement in the dark for past 2 weeks, believes that there are people after him. Patient reports that he has been compliant with his Zyprexa, though mother indicates that he has not been taking it. It does appear that he has remained relatively stable on Zyprexa for at least the last 10 years, so it is likely that he was not as compliant as he reports. He did recently move to his mother's home near [place], [place] in the past 2 months from [place] where he had been homeless. Current psych provider is still in [place] and per documentation it appears that he currently has a prescription for Zyprexa waiting for him to  at [facility]. Will ensure that he is able to obtain his prescriptions at discharge, whether they be mailed or transferred to pharmacy closer to his current residence.        Interim History:   Andre is resting in bed when I see him today. [facility] has stopped by earlier to discuss completing a Rule 25, though patient declined. He states that he has not been drinking alcohol much over the last few months, since going to live with his mother \"she hates when I drink\". Does report a long history of alcohol abuse in the past. Discussed his Zyprexa and whether he would consider adjustment in dose or potential trial or a different neuroleptic to help with paranoia. However he declines, stating that he has been taking Zyprexa for 15 years and does not want to change anything. He denies feeling paranoid, instead stating that it is anxiety that keeps him from leaving his room. He has not requested any medication for anxiety during the day. Appears he is sleeping well at night. He denies any hallucinations, denies any SI or HI. " "He is still indecisive on where he will go at discharge, is hoping to speak with his father about possibly staying with him. Encouraged to make some phone calls tonight to discuss discharge options.         Educated regarding medication indications, risks, benefits, side effects, contraindications and possible interactions. Verbally expressed understanding.        Diagnoses:   Schizoaffective disorder- bipolar type (by history)  Alcohol use disorder, moderate        Attestation:  Patient has been seen and evaluated by me,  Hemalatha Serrato NP      The patient's care was discussed with the treatment team and chart notes were reviewed.         Medications:     Current Facility-Administered Medications Ordered in Epic   Medication Dose Route Frequency Last Rate Last Admin     acetaminophen (TYLENOL) tablet 650 mg  650 mg Oral Q4H PRN         alum & mag hydroxide-simethicone (MAALOX) suspension 30 mL  30 mL Oral Q4H PRN         hydrOXYzine (ATARAX) tablet 25-50 mg  25-50 mg Oral Q4H PRN         melatonin tablet 3 mg  3 mg Oral At Bedtime PRN         nicotine (NICORETTE) gum 2 mg  2 mg Buccal Q1H PRN   2 mg at 08/20/21 0845     OLANZapine (zyPREXA) tablet 30 mg  30 mg Oral QPM   30 mg at 08/19/21 2003     QUEtiapine (SEROquel) tablet  mg   mg Oral BID PRN         senna-docusate (SENOKOT-S/PERICOLACE) 8.6-50 MG per tablet 1 tablet  1 tablet Oral BID PRN         No current [facility]-ordered outpatient medications on file.            10 point ROS- denies acute concerns       Allergies:   No Known Allergies         Psychiatric Examination:   /71   Pulse 81   Temp 98.1  F (36.7  C) (Tympanic)   Resp 14   Ht 1.778 m (5' 10\")   Wt 68.9 kg (151 lb 12.8 oz)   SpO2 96%   BMI 21.78 kg/m    Weight is 151 lbs 12.8 oz  Body mass index is 21.78 kg/m .    Appearance: awake, alert, dressed in hospital scrubs, somewhat disheveled  Attitude: cooperative, guarded  Eye Contact:  good  Mood: \"fine\", does seem " anxious  Affect: blunted  Speech:  clear, coherent  Psychomotor Behavior:  no evidence of tardive dyskinesia, dystonia, or tics  Thought Process:  linear  Associations:  no loose associations noted  Thought Content:  Denies suicidal thoughts, denies any hallucinations today, admits to mild paranoia which is evident  Insight:  limited  Judgment:  limited  Oriented to:  time, person, and place  Attention Span and Concentration:  fair  Recent and Remote Memory:  intact  Fund of Knowledge: appropriate for education  Muscle Strength and Tone: normal  Gait and Station: Normal           Labs:   No results found for this or any previous visit (from the past 24 hour(s)).      BEHAVIORAL TEAM DISCUSSION    Progress: variable. Restarted Zyprexa to treat symptoms of paranoia    Continued Stay Criteria/Rationale: restart zyprexa to treat psychosis, paranoia    Medical/Physical: none acute  Precautions:   Falls precaution?: No  Behavioral Orders   Procedures     Code 1 - Restrict to Unit     Routine Programming     As clinically indicated     Status 15     Every 15 minutes.     Plan:     Continue Zyprexa 30 mg at bedtime- not interested in adjusting dose or trialing anything new  Recommended Rule 25, though unclear if patient plans on going through with this  Encouraged to make phone calls tonight to determine where he can stay at discharge          Participants: FERDINAND Black, CNP, Nursing, SW, OT, Dr Morales

## 2021-08-23 NOTE — PLAN OF CARE
Problem: Behavioral Health Plan of Care  Goal: Patient-Specific Goal (Individualization)  Description: Pt will sleep at least 6-8 hours per NOC shift.  Pt will eat at least 50% of meals.  Pt will be medication compliant.  Pt will follow treatment team recommendations,  Pt will attend at least 50% of groups.      Outcome: Improving     Face to face shift report received from RN. Rounding completed, pt observed. Client rested in room for 7 hours with eyes closed and respirations noted. Face to face report will be communicated to oncoming RN.    Matti Lorenzo RN  8/23/2021  6:40 AM

## 2021-08-23 NOTE — PLAN OF CARE
"Denies pain, suicidal and homicidal ideation, auditory and visual hallucinations. Admits he is depressed and anxious. Mom called at [phone];  She is requesting a Rule 25 and a psychiatric assessment before he is allowed back to her house and that she is \"not equipped to deal with him until those two assessments are done\".  He responded \"Maybe I'll do the rule 25. I don't know.\" Isolative, in bed. Ate approximately 50% of supper. Repeatedly asking about his backpack, phone chargers and phone, despite informing him that his phones are with security and his personal belongings are behind locked door. Later in the evening, he told another nurse that he would be willing to do the Rule 25 on 8/24. Calm and cooperative. Stayed in bed this shift.      Problem: Behavioral Health Plan of Care  Goal: Patient-Specific Goal (Individualization)  Description: Pt will sleep at least 6-8 hours per NOC shift.  Pt will eat at least 50% of meals.  Pt will be medication compliant.  Pt will follow treatment team recommendations,  Pt will attend at least 50% of groups.      Outcome: Improving  Goal: Absence of New-Onset Illness or Injury  Outcome: Improving     Problem: Thought Process Alteration  Goal: Optimal Thought Clarity  Description: Pt will report no paranoid statements by discharge.  Pt will be able to maintain a linear and reality based conversation by discharge    Outcome: Improving     "

## 2021-08-23 NOTE — PLAN OF CARE
"Met with patient with  - patient reports he doesn't \"really\" drink, and states he drank more when he was younger. Patient declined a substance use assessment and asked  for help with transportation.   "

## 2021-08-23 NOTE — PLAN OF CARE
Problem: Behavioral Health Plan of Care  Goal: Patient-Specific Goal (Individualization)  Description: Pt will sleep at least 6-8 hours per NOC shift.  Pt will eat at least 50% of meals.  Pt will be medication compliant.  Pt will follow treatment team recommendations,  Pt will attend at least 50% of groups.      Outcome: Improving  Note:   Pt in bed this shift, continues to state that he wants to go home today. Pt encouraged to have the Rule 25 while he is here. Pt states that he doesn't want to wait. Pt denies symptoms of pain, depression, SI, HI and hallucinations. Pt reports anxiety at 5/10. Pt refused to come out of his bedroom for meals.      Staff found a toothbrush that had the head of the toothbrush broken off in pt's bathroom.       Problem: Thought Process Alteration  Goal: Optimal Thought Clarity  Description: Pt will report no paranoid statements by discharge.  Pt will be able to maintain a linear and reality based conversation by discharge    Outcome: Improving     Problem: Depression  Goal: Improved Mood  Description: Pt will report manageable levels of depression and anxiety by discharge.  Pt will contract for safety while on the unit.  Pt will report worsening of symptoms and any suicidal ideation.  Outcome: Improving

## 2021-08-24 PROCEDURE — 250N000013 HC RX MED GY IP 250 OP 250 PS 637: Performed by: NURSE PRACTITIONER

## 2021-08-24 PROCEDURE — 124N000001 HC R&B MH

## 2021-08-24 PROCEDURE — 99232 SBSQ HOSP IP/OBS MODERATE 35: CPT | Performed by: NURSE PRACTITIONER

## 2021-08-24 RX ADMIN — OLANZAPINE 30 MG: 10 TABLET, FILM COATED ORAL at 20:29

## 2021-08-24 RX ADMIN — NICOTINE 1 PATCH: 14 PATCH, EXTENDED RELEASE TRANSDERMAL at 08:42

## 2021-08-24 RX ADMIN — QUETIAPINE FUMARATE 50 MG: 50 TABLET ORAL at 14:41

## 2021-08-24 ASSESSMENT — ACTIVITIES OF DAILY LIVING (ADL)
HYGIENE/GROOMING: INDEPENDENT
HYGIENE/GROOMING: INDEPENDENT
ORAL_HYGIENE: INDEPENDENT
LAUNDRY: UNABLE TO COMPLETE
ORAL_HYGIENE: INDEPENDENT
DRESS: SCRUBS (BEHAVIORAL HEALTH);INDEPENDENT
DRESS: INDEPENDENT

## 2021-08-24 NOTE — PROGRESS NOTES
"[facility]  Psychiatric Progress Note      Impression:   This is a 46 year old yo male with a history of schizoaffective disorder- bipolar type and alcohol abuse. Mother brought him to the ED due to increase in paranoia and hallucinations. Reported that he had been hiding in their basement in the dark for past 2 weeks, believes that there are people after him. Patient reports that he has been compliant with his Zyprexa, though mother indicates that he has not been taking it. It does appear that he has remained relatively stable on Zyprexa for at least the last 10 years, so it is likely that he was not as compliant as he reports. He did recently move to his mother's home near [place], [place] in the past 2 months from [place] where he had been homeless. Current psych provider is still in [place] and per documentation it appears that he currently has a prescription for Zyprexa waiting for him to  at [facility]. Will ensure that he is able to obtain his prescriptions at discharge, whether they be mailed or transferred to pharmacy closer to his current residence.        Interim History:   Andre is resting in bed when I see him today. Engages a little easier in conversation today. Continues to be indecisive regarding discharge planning. He does state that he does not feel that his alcohol use is an issue, so he would likely not follow through with Rule 25 recommendations if this were completed. He is also not interested in changing or adjusting medications, wishes to continue with Zyprexa \"taken it for 15 years\", has no interest in discussing alternatives. He denies any hallucinations today, denies feeling depressed. Does appear a bit anxious when discharge planning is discussed. He is still undecided about where he will go at discharge, though we did discuss that transportation could be arranged if he wanted to get to [place] where his father lives. Encouraged him to make some phone calls today to find out where he " "could stay. Does seem noncommittal in regards to discharge planning, so does need encouragement to complete tasks.         Educated regarding medication indications, risks, benefits, side effects, contraindications and possible interactions. Verbally expressed understanding.        Diagnoses:   Schizoaffective disorder- bipolar type (by history)  Alcohol use disorder, moderate        Attestation:  Patient has been seen and evaluated by me,  Hemalatha Serrato, NP      The patient's care was discussed with the treatment team and chart notes were reviewed.         Medications:     Current Facility-Administered Medications Ordered in Epic   Medication Dose Route Frequency Last Rate Last Admin     acetaminophen (TYLENOL) tablet 650 mg  650 mg Oral Q4H PRN         alum & mag hydroxide-simethicone (MAALOX) suspension 30 mL  30 mL Oral Q4H PRN         hydrOXYzine (ATARAX) tablet 25-50 mg  25-50 mg Oral Q4H PRN         melatonin tablet 3 mg  3 mg Oral At Bedtime PRN         nicotine (NICORETTE) gum 2 mg  2 mg Buccal Q1H PRN   2 mg at 08/20/21 0845     OLANZapine (zyPREXA) tablet 30 mg  30 mg Oral QPM   30 mg at 08/19/21 2003     QUEtiapine (SEROquel) tablet  mg   mg Oral BID PRN         senna-docusate (SENOKOT-S/PERICOLACE) 8.6-50 MG per tablet 1 tablet  1 tablet Oral BID PRN         No current [facility]-ordered outpatient medications on file.            10 point ROS- denies acute concerns       Allergies:   No Known Allergies         Psychiatric Examination:   /80 (BP Location: Right arm)   Pulse 84   Temp 98  F (36.7  C) (Temporal)   Resp 16   Ht 1.778 m (5' 10\")   Wt 68.9 kg (151 lb 12.8 oz)   SpO2 98%   BMI 21.78 kg/m    Weight is 151 lbs 12.8 oz  Body mass index is 21.78 kg/m .    Appearance: awake, alert, dressed in hospital scrubs, somewhat disheveled  Attitude: cooperative, guarded  Eye Contact: fair  Mood: \"fine\", does seem anxious  Affect: blunted  Speech:  clear, coherent  Psychomotor Behavior:  " no evidence of tardive dyskinesia, dystonia, or tics  Thought Process:  linear  Associations:  no loose associations noted  Thought Content:  Denies suicidal thoughts, denies any hallucinations today, admits to mild paranoia, does appear preoccupied  Insight:  limited  Judgment:  limited  Oriented to:  time, person, and place  Attention Span and Concentration:  fair  Recent and Remote Memory:  intact  Fund of Knowledge: appropriate for education  Muscle Strength and Tone: normal  Gait and Station: Normal           Labs:   No results found for this or any previous visit (from the past 24 hour(s)).      BEHAVIORAL TEAM DISCUSSION    Progress: Limited. Restarted Zyprexa to treat symptoms of paranoia. No interest in adjusting or changing medications. Not attending groups.    Continued Stay Criteria/Rationale: restart zyprexa to treat psychosis, paranoia    Medical/Physical: none acute  Precautions:   Falls precaution?: No  Behavioral Orders   Procedures     Code 1 - Restrict to Unit     Routine Programming     As clinically indicated     Status 15     Every 15 minutes.     Plan:     Continue Zyprexa 30 mg at bedtime- not interested in adjusting dose or trialing anything new  Recommended Rule 25, though unclear if patient plans on going through with this  Encouraged to make phone calls to determine where he can stay at discharge          Participants: FERDINAND Black, CNP, Nursing, SW, OT, Dr Morales

## 2021-08-24 NOTE — PLAN OF CARE
"  Problem: Behavioral Health Plan of Care  Goal: Patient-Specific Goal (Individualization)  Description: Pt will sleep at least 6-8 hours per NOC shift.  Pt will eat at least 50% of meals.  Pt will be medication compliant.  Pt will follow treatment team recommendations,  Pt will attend at least 50% of groups.  Outcome: Improving  Note:     Pt has been in bed during this shift, Pt denies symptoms of depression, SI, HI, hallucinations and pain. Pt does report anxiety. Pt was asked about the broken toothbrush found in his room, pt stated \"I wasn't gonna kill myself with it\". Pt asked if he broke off the toothbrush head to use for protection, at first pt stated he did it because he was bored but later stated that he won't need it again as his room mate left.     Pt was offered seroquel 50mg this afternoon, nursing told pt that his anxiety and fears may decrease with a dose of seroquel making it easier for him to think. Pt agreed right away and stated that he had taken that before. Pt given seroquel 50mg at 1441.        Problem: Thought Process Alteration  Goal: Optimal Thought Clarity  Description: Pt will report no paranoid statements by discharge.  Pt will be able to maintain a linear and reality based conversation by discharge    Outcome: Improving     Problem: Depression  Goal: Improved Mood  Description: Pt will report manageable levels of depression and anxiety by discharge.  Pt will contract for safety while on the unit.  Pt will report worsening of symptoms and any suicidal ideation.  Outcome: Improving     "

## 2021-08-24 NOTE — PLAN OF CARE
Problem: Behavioral Health Plan of Care  Goal: Patient-Specific Goal (Individualization)  Description: Pt will sleep at least 6-8 hours per NOC shift.  Pt will eat at least 50% of meals.  Pt will be medication compliant.  Pt will follow treatment team recommendations,  Pt will attend at least 50% of groups.  Outcome: Improving  Note: Shift Summery:     Face to face end of shift report received from evening shift RN. Rounding completed. Pt observed in own bed, with eyes closed, and with rested breathing. Will continue to monitor. Face to face end of shift report to be given to day shift RN.     Pt slept approximately 8 hours.      Problem: Thought Process Alteration  Goal: Optimal Thought Clarity  Description: Pt will report no paranoid statements by discharge.  Pt will be able to maintain a linear and reality based conversation by discharge  Outcome: Improving     Problem: Depression  Goal: Improved Mood  Description: Pt will report manageable levels of depression and anxiety by discharge.  Pt will contract for safety while on the unit.  Pt will report worsening of symptoms and any suicidal ideation.  Outcome: Improving

## 2021-08-24 NOTE — PLAN OF CARE
"Spoke to pt this afternoon. Pt states that he does not want a rule 25 and mentions going back to their mom's. Let pt know this wasn't an option because mom does not feel she can handle their mental health and CD. Pt says \"no that's not it, I just cause friction in the house hold\". Pt mentions going to dads, when asked if he knew the number to call they said, \"well what about the rule 25?\" When asking if they truly wanted one done and what the possible outcomes could be, the pt replies with \"well what time are you here tomorrow, we can figure it out then.\" Pt had no further questions or concerns.   "

## 2021-08-24 NOTE — PLAN OF CARE
"Face to face shift report received from Rosalina MAURER RN. Rounding completed, pt observed.    Problem: Behavioral Health Plan of Care  Goal: Patient-Specific Goal (Individualization)  Description: Pt will sleep at least 6-8 hours per NOC shift.  Pt will eat at least 50% of meals.  Pt will be medication compliant.  Pt will follow treatment team recommendations,  Pt will attend at least 50% of groups.      Outcome: No Change  Note: Shift Summery:  Patient was in bed at the start of this shift. Patient states that \"I will think more about the Rule 25 more tomorrow.  Maybe I will leave.  I'm not sure\".  This were the same answers patient gave me last week.  Patient continues to state that he \"does not like groups\" so is not willing to attend our group activities or come out to the lounge for meals.  At the start of this shift, patient did report that he felt \"less anxious after that other nurse gave me that Seroquel\".  Patient was then asked if he wanted more Seroquel around supper time but then stated \"I don;t think that worked.  I get the olanzapine later anyway\". Patient had tray brought to room and ate well.   Patient denies pain.  Guarded when asked about his drinking and minimizes.his use.  Patient encouraged to come out of his room but has not yet this shift.  2250:  Patient declined HS snack.  Compliant with scheduled medications.  Nicotine patch in place on left deltoid.  Patient repeated the same conversation as last interaction with this nurse.    Problem: Thought Process Alteration  Goal: Optimal Thought Clarity  Description: Pt will report no paranoid statements by discharge.  Pt will be able to maintain a linear and reality based conversation by discharge    Outcome: Improving     Problem: Depression  Goal: Improved Mood  Description: Pt will report manageable levels of depression and anxiety by discharge.  Pt will contract for safety while on the unit.  Pt will report worsening of symptoms and any suicidal " ideation.  Outcome: Improving   Face to face report will be communicated to oncoming RN.    Ashley Hunter RN  8/24/2021

## 2021-08-25 PROCEDURE — 250N000013 HC RX MED GY IP 250 OP 250 PS 637: Performed by: NURSE PRACTITIONER

## 2021-08-25 PROCEDURE — 124N000001 HC R&B MH

## 2021-08-25 PROCEDURE — 99232 SBSQ HOSP IP/OBS MODERATE 35: CPT | Performed by: NURSE PRACTITIONER

## 2021-08-25 RX ORDER — QUETIAPINE FUMARATE 100 MG/1
100 TABLET, FILM COATED ORAL 3 TIMES DAILY PRN
Status: DISCONTINUED | OUTPATIENT
Start: 2021-08-25 | End: 2021-09-03 | Stop reason: HOSPADM

## 2021-08-25 RX ADMIN — NICOTINE 1 PATCH: 14 PATCH, EXTENDED RELEASE TRANSDERMAL at 08:38

## 2021-08-25 RX ADMIN — OLANZAPINE 30 MG: 10 TABLET, FILM COATED ORAL at 20:26

## 2021-08-25 RX ADMIN — QUETIAPINE FUMARATE 100 MG: 100 TABLET ORAL at 14:32

## 2021-08-25 RX ADMIN — QUETIAPINE FUMARATE 100 MG: 50 TABLET ORAL at 08:40

## 2021-08-25 ASSESSMENT — ACTIVITIES OF DAILY LIVING (ADL)
HYGIENE/GROOMING: INDEPENDENT
ORAL_HYGIENE: INDEPENDENT
DRESS: INDEPENDENT

## 2021-08-25 NOTE — PLAN OF CARE
Problem: Behavioral Health Plan of Care  Goal: Patient-Specific Goal (Individualization)  Description: Pt will sleep at least 6-8 hours per NOC shift.  Pt will eat at least 50% of meals.  Pt will be medication compliant.  Pt will follow treatment team recommendations,  Pt will attend at least 50% of groups.  Outcome: Improving  Note: Shift Summery:      Face to face end of shift report received from evening shift RN. Rounding completed. Pt observed in own bed, with eyes closed, in left side-lying position, and with rested breathing. Will continue to monitor. Face to face end of shift report to be given to day shift RN.     Pt slept approximately 7 hours.      Problem: Thought Process Alteration  Goal: Optimal Thought Clarity  Description: Pt will report no paranoid statements by discharge.  Pt will be able to maintain a linear and reality based conversation by discharge  Outcome: Improving      Problem: Depression  Goal: Improved Mood  Description: Pt will report manageable levels of depression and anxiety by discharge.  Pt will contract for safety while on the unit.  Pt will report worsening of symptoms and any suicidal ideation.  Outcome: Improving

## 2021-08-25 NOTE — PROGRESS NOTES
"[facility]  Psychiatric Progress Note      Impression:   This is a 46 year old yo male with a history of schizoaffective disorder- bipolar type and alcohol abuse. Mother brought him to the ED due to increase in paranoia and hallucinations. Reported that he had been hiding in their basement in the dark for past 2 weeks, believes that there are people after him. Patient reports that he has been compliant with his Zyprexa, though mother indicates that he has not been taking it. It does appear that he has remained relatively stable on Zyprexa for at least the last 10 years, so it is likely that he was not as compliant as he reports. He did recently move to his mother's home near [place], [place] in the past 2 months from [place] where he had been homeless. Current psych provider is still in [place] and per documentation it appears that he currently has a prescription for Zyprexa waiting for him to  at [facility]. Will ensure that he is able to obtain his prescriptions at discharge, whether they be mailed or transferred to pharmacy closer to his current residence.        Interim History:     I found Andre sitting on the edge of his bed today. We talked about how his mother wants him to have a Rule 25 and attend CD treatment before she's willing to entertain the idea of him returning to her home. He tells me he \"only drank 3 times in 6 months of living there,\" but acknowledges that he and his step-dad haven't been getting along. He also knows that if he continues to be ambivalent about completing the Rule 25 (as he has been recently), the plan would be to discharge him to a homeless shelter. He tells me \"that's not going to happen\" and agreed to complete the Rule 25 on Monday when our LADC is available. He tells me his \"a little anxious and depressed\", but notes the PRN Seroquel helped with the anxiety this morning. I did encourage him to come out of his room for meals and groups and he declines stating \"I'm just " "used to keeping to myself. I'm kind of a loner.\"  He denies any hallucinations or delusions today, but he appears to be preoccupied and could certainly be responding to internal stimuli. The patient denies any issues with sleep or appetite. Patient denies suicidal or homicidal ideation.         Educated regarding medication indications, risks, benefits, side effects, contraindications and possible interactions. Verbally expressed understanding.        Diagnoses:   Schizoaffective disorder- bipolar type (by history)  Alcohol use disorder, moderate        Attestation:  Patient has been seen and evaluated by me,  FERDINAND Lowe CNP      The patient's care was discussed with the treatment team and chart notes were reviewed.         Medications:     Current Facility-Administered Medications Ordered in Epic   Medication Dose Route Frequency Last Rate Last Admin     acetaminophen (TYLENOL) tablet 650 mg  650 mg Oral Q4H PRN         alum & mag hydroxide-simethicone (MAALOX) suspension 30 mL  30 mL Oral Q4H PRN         hydrOXYzine (ATARAX) tablet 25-50 mg  25-50 mg Oral Q4H PRN         melatonin tablet 3 mg  3 mg Oral At Bedtime PRN         nicotine (NICORETTE) gum 2 mg  2 mg Buccal Q1H PRN   2 mg at 08/20/21 0845     OLANZapine (zyPREXA) tablet 30 mg  30 mg Oral QPM   30 mg at 08/19/21 2003     QUEtiapine (SEROquel) tablet  mg   mg Oral BID PRN         senna-docusate (SENOKOT-S/PERICOLACE) 8.6-50 MG per tablet 1 tablet  1 tablet Oral BID PRN         No current [facility]-ordered outpatient medications on file.            10 point ROS- denies acute concerns       Allergies:   No Known Allergies         Psychiatric Examination:   /78 (BP Location: Right arm)   Pulse 97   Temp 98.6  F (37  C) (Temporal)   Resp 14   Ht 1.778 m (5' 10\")   Wt 68.9 kg (151 lb 12.8 oz)   SpO2 98%   BMI 21.78 kg/m    Weight is 151 lbs 12.8 oz  Body mass index is 21.78 kg/m .    Appearance: awake, alert, dressed in " "hospital scrubs, somewhat disheveled  Attitude: cooperative, guarded  Eye Contact: fair  Mood: \"a little depressed and anxious\"  Affect: blunted  Speech:  clear, coherent  Psychomotor Behavior:  no evidence of tardive dyskinesia, dystonia, or tics  Thought Process:  linear  Associations:  no loose associations noted  Thought Content:  Denies suicidal thoughts, denies any hallucinations today, admits to mild paranoia, does appear preoccupied  Insight:  limited  Judgment:  limited  Oriented to:  time, person, and place  Attention Span and Concentration:  fair  Recent and Remote Memory:  intact  Fund of Knowledge: appropriate for education  Muscle Strength and Tone: normal  Gait and Station: Normal           Labs:   No results found for this or any previous visit (from the past 24 hour(s)).      BEHAVIORAL TEAM DISCUSSION    Progress: Limited. Restarted Zyprexa to treat symptoms of paranoia. No interest in adjusting or changing medications. Not attending groups.    Continued Stay Criteria/Rationale: restart zyprexa to treat psychosis, paranoia    Medical/Physical: none acute  Precautions:   Falls precaution?: No  Behavioral Orders   Procedures     Code 1 - Restrict to Unit     Routine Programming     As clinically indicated     Status 15     Every 15 minutes.     Plan:     Continue Zyprexa 30 mg at bedtime- not interested in adjusting dose or trialing anything new  Complete Rule 25 on Monday  Increase PRN Seroquel to 100 mg TID PRN for anxiety/agitation      Participants: Danette Segovia CNP, Nursing, SW, OT, Dr. Ad Morales    "

## 2021-08-25 NOTE — PLAN OF CARE
Face to face shift report received from Rosalina MAURER RN. Rounding completed, pt observed.    Problem: Behavioral Health Plan of Care  Goal: Patient-Specific Goal (Individualization)  Description: Pt will sleep at least 6-8 hours per NOC shift.  Pt will eat at least 50% of meals.  Pt will be medication compliant.  Pt will follow treatment team recommendations,  Pt will attend at least 50% of groups.      Outcome: No Change  Note: Shift Summery: Patient was awake in bed at the start of this shift.  Patient more talkative with nurse and did not repeat the same answers to questions as he had done last 2  Evenings.  Patient informs nurse that he will do the Rule 25 on Monday then states that he might want to leave before that day.  Informed patient that his mother was concerned about him and that it would be best to wait till after the assessment.  Patient given large container of ice water as low grade temp.  Encouraged to increase fluids.  Tray brought to patient for evening meal.  [facility] but patient declined.  Presents as paranoid as asked nurse who was at the door when nobody else present.  Patient asked for and did receive clean scrubs     Problem: Thought Process Alteration  Goal: Optimal Thought Clarity  Description: Pt will report no paranoid statements by discharge.  Pt will be able to maintain a linear and reality based conversation by discharge    Outcome: No Change   Face to face report will be communicated to oncoming RN.    Ashley Hunter RN  8/25/2021

## 2021-08-25 NOTE — PLAN OF CARE
Spoke to Rafia (pt's mom) She states pt needs a rule 25 and that they will not be able to come home without going to a 30 day inpatient treatment. Let Rafia know that a rule 25 has been offered x3 and then declined the next day when it was going to happen. Also that our LADC is not available today and wont be back into the office until Monday. If the pt denies again we would be looking into discharging to the homeless shelter in [place]. Mom understands this and requested to talk to pt to encourage taking the rule 25. After the call pt's RN and this writer went back to speak with the pt and explain pt's options. After explaining that their options right now are either a rule 25 and treatment or discharging to a homeless selter, the pt has [facility]. Pt is made aware that if the assessment is not done on Monday that he will be discharged to a homeless shelter.

## 2021-08-25 NOTE — PLAN OF CARE
Problem: Behavioral Health Plan of Care  Goal: Patient-Specific Goal (Individualization)  Description: Pt will sleep at least 6-8 hours per NOC shift.  Pt will eat at least 50% of meals.  Pt will be medication compliant.  Pt will follow treatment team recommendations,  Pt will attend at least 50% of groups.  Outcome: Improving  Note:     Pt in bed this shift. Pt continues to refuse getting up to get his meals and would go without eating if staff doesn't bring them to his room. Pt denies pain, SI, HI and hallucinations. Pt reports anxiety at 5/10, depression at 5/10.  Pt given seroquel 100mg at 0840 for anxiety. Pt agreed to stay in the unit until Monday to complete the Rule 25.      Pt requested seroquel 100mg again at 1432. Pt continues to report anxiety and be unable to leave his room. Pt does state that the seroquel was helpful and staff noticed more eye contact when speaking to staff this shift.     Problem: Thought Process Alteration  Goal: Optimal Thought Clarity  Description: Pt will report no paranoid statements by discharge.  Pt will be able to maintain a linear and reality based conversation by discharge    Outcome: Improving     Problem: Depression  Goal: Improved Mood  Description: Pt will report manageable levels of depression and anxiety by discharge.  Pt will contract for safety while on the unit.  Pt will report worsening of symptoms and any suicidal ideation.  Outcome: Improving       Face to face end of shift report communicated to evening shift RN..     Rosalina Cantu RN  8/25/2021  1:58 PM

## 2021-08-26 LAB — SARS-COV-2 RNA RESP QL NAA+PROBE: NEGATIVE

## 2021-08-26 PROCEDURE — U0005 INFEC AGEN DETEC AMPLI PROBE: HCPCS | Performed by: NURSE PRACTITIONER

## 2021-08-26 PROCEDURE — 250N000013 HC RX MED GY IP 250 OP 250 PS 637: Performed by: NURSE PRACTITIONER

## 2021-08-26 PROCEDURE — 124N000001 HC R&B MH

## 2021-08-26 RX ADMIN — OLANZAPINE 30 MG: 10 TABLET, FILM COATED ORAL at 20:16

## 2021-08-26 RX ADMIN — NICOTINE 1 PATCH: 14 PATCH, EXTENDED RELEASE TRANSDERMAL at 08:24

## 2021-08-26 ASSESSMENT — ACTIVITIES OF DAILY LIVING (ADL)
DRESS: SCRUBS (BEHAVIORAL HEALTH)
HYGIENE/GROOMING: INDEPENDENT
ORAL_HYGIENE: INDEPENDENT
HYGIENE/GROOMING: INDEPENDENT
ORAL_HYGIENE: INDEPENDENT
LAUNDRY: UNABLE TO COMPLETE
DRESS: INDEPENDENT;SCRUBS (BEHAVIORAL HEALTH)
LAUNDRY: UNABLE TO COMPLETE

## 2021-08-26 NOTE — PLAN OF CARE
Problem: Behavioral Health Plan of Care  Goal: Patient-Specific Goal (Individualization)  Description: Pt will sleep at least 6-8 hours per NOC shift.  Pt will eat at least 50% of meals.  Pt will be medication compliant.  Pt will follow treatment team recommendations,  Pt will attend at least 50% of groups.      Outcome: Improving  Goal: Develops/Participates in [facility] [place] to Support Successful Transition  Outcome: No Change     Problem: Depression  Goal: Improved Mood  Description: Pt will report manageable levels of depression and anxiety by discharge.  Pt will contract for safety while on the unit.  Pt will report worsening of symptoms and any suicidal ideation.  Outcome: No Change     Face to face shift report received from RN. Rounding completed, pt observed. Client was cooperative with receiving nicotine patch this morning. He refused to come out of room for meals or groups. He has made no suicidal gestures or verbalizations. His affect is depressed. Client allowed this recorder to administer COVID test. He requested and used [facility] phone. Face to face report will be communicated to oncoming RN.    Matti Lorenzo RN  8/26/2021  2:39 PM

## 2021-08-26 NOTE — PLAN OF CARE
Face to face end of shift report received from Matti HAMLIN RN.  Pt observed awake in his room.       Problem: Behavioral Health Plan of Care  Goal: Patient-Specific Goal (Individualization)  Description: Pt will sleep at least 6-8 hours per NOC shift.  Pt will eat at least 50% of meals.  Pt will be medication compliant.  Pt will follow treatment team recommendations,  Pt will attend at least 50% of groups.    Outcome: No Change  Note:   Pt is irritable this shift.  He has been isolative to his room.  He did not come out all shift.  He states he has anxiety and is tired of being here. States he is just waiting for his rule 25.  Pt was cooperative with all scheduled medications.  Makes needs known.      Problem: Thought Process Alteration  Goal: Optimal Thought Clarity  Description: Pt will report no paranoid statements by discharge.  Pt will be able to maintain a linear and reality based conversation by discharge    Outcome: No Change     Problem: Depression  Goal: Improved Mood  Description: Pt will report manageable levels of depression and anxiety by discharge.  Pt will contract for safety while on the unit.  Pt will report worsening of symptoms and any suicidal ideation.  Outcome: No Change

## 2021-08-26 NOTE — PLAN OF CARE
Face to face report received from Ashley HERNANDEZ. Pt. Observed.     Problem: Behavioral Health Plan of Care  Goal: Patient-Specific Goal (Individualization)  Description: Pt will sleep at least 6-8 hours per NOC shift.  Pt will eat at least 50% of meals.  Pt will be medication compliant.  Pt will follow treatment team recommendations,  Pt will attend at least 50% of groups.      Outcome: No Change  Note: Pt has been in bed with eyes closed and regular respirations x 7 hours this noc shift. 15 minute and PRN checks all night. No complaints offered. Will continue to monitor.         Face to face end of shift report to be communicated to oncoming RN.     Marie Teresa RN  8/26/2021

## 2021-08-27 PROCEDURE — 250N000013 HC RX MED GY IP 250 OP 250 PS 637: Performed by: NURSE PRACTITIONER

## 2021-08-27 PROCEDURE — 99233 SBSQ HOSP IP/OBS HIGH 50: CPT | Performed by: NURSE PRACTITIONER

## 2021-08-27 PROCEDURE — 124N000001 HC R&B MH

## 2021-08-27 RX ADMIN — NICOTINE 1 PATCH: 14 PATCH, EXTENDED RELEASE TRANSDERMAL at 08:04

## 2021-08-27 RX ADMIN — QUETIAPINE FUMARATE 100 MG: 100 TABLET ORAL at 07:54

## 2021-08-27 RX ADMIN — OLANZAPINE 30 MG: 10 TABLET, FILM COATED ORAL at 20:00

## 2021-08-27 ASSESSMENT — ACTIVITIES OF DAILY LIVING (ADL)
DRESS: INDEPENDENT;SCRUBS (BEHAVIORAL HEALTH)
DRESS: SCRUBS (BEHAVIORAL HEALTH);INDEPENDENT
HYGIENE/GROOMING: INDEPENDENT
ORAL_HYGIENE: INDEPENDENT
HYGIENE/GROOMING: INDEPENDENT
ORAL_HYGIENE: INDEPENDENT
LAUNDRY: UNABLE TO COMPLETE

## 2021-08-27 NOTE — PLAN OF CARE
"RUPAL DAVIES RN  8/27/2021  7:44 AM  Face to face shift report received from DAVID Yost. Rounding completed, pt observed.     0759-Reports not sleeping well last night as it was loud and he couldn't fall back to sleep.  Anxiety and depression are high-Seroquel 100mg given.  Denies pain, SI, HI, and hallucinations.  Continues to be paranoid and not wanting to leave his room.  Goal today is for pt to walk out and get his own food tray and eat in his room.  Pt states \"I'm not that hungry\".  Pt requesting to use ascom phone and was told he can use the phones in the lounge.    0802-Pt walked out and got his breakfast tray.       1239-Pt got his lunch tray and ate in room.  Pt is in agreement with using the lounge phone when others aren't in the lounge (I.e. group times).  Reports Seroquel helping his anxiety \"a little\" this morning.  Continues to be paranoid, withdrawn, and isolative.   1428-Pt attempted a phone call in lounge-line was busy.  Pt has not came out of his room since.  Withdrawn and isolative.  Took shower.      Problem: Behavioral Health Plan of Care  Goal: Patient-Specific Goal (Individualization)  Description: Pt will sleep at least 6-8 hours per NOC shift.  Pt will eat at least 50% of meals.  Pt will be medication compliant.  Pt will follow treatment team recommendations,  Pt will attend at least 50% of groups.  Outcome: No Change     Problem: Thought Process Alteration  Goal: Optimal Thought Clarity  Description: Pt will report no paranoid statements by discharge.  Pt will be able to maintain a linear and reality based conversation by discharge  Outcome: No Change     Problem: Depression  Goal: Improved Mood  Description: Pt will report manageable levels of depression and anxiety by discharge.  Pt will contract for safety while on the unit.  Pt will report worsening of symptoms and any suicidal ideation.  Outcome: No Change     Face to face end of shift report communicated to oncoming shift RN. "

## 2021-08-27 NOTE — PLAN OF CARE
Face to face report received from Lucila HERNANDEZ. Pt. Observed.     Problem: Behavioral Health Plan of Care  Goal: Patient-Specific Goal (Individualization)  Description: Pt will sleep at least 6-8 hours per NOC shift.  Pt will eat at least 50% of meals.  Pt will be medication compliant.  Pt will follow treatment team recommendations,  Pt will attend at least 50% of groups.      Outcome: No Change  Note: Pt has been in bed with eyes closed and regular respirations x 7 hours this noc shift. 15 minute and PRN checks all night. No complaints offered. Will continue to monitor.         Face to face end of shift report to be communicated to oncoming RN.     Marie Teresa RN  8/27/2021

## 2021-08-27 NOTE — PROGRESS NOTES
"[facility]  Psychiatric Progress Note      Impression:   This is a 46 year old yo male with a history of schizoaffective disorder- bipolar type and alcohol abuse. Mother brought him to the ED due to increase in paranoia and hallucinations. Reported that he had been hiding in their basement in the dark for past 2 weeks, believes that there are people after him. Patient reports that he has been compliant with his Zyprexa, though mother indicates that he has not been taking it. It does appear that he has remained relatively stable on Zyprexa for at least the last 10 years, so it is likely that he was not as compliant as he reports. He did recently move to his mother's home near [place], [place] in the past 2 months from [place] where he had been homeless. Current psych provider is still in [place] and per documentation it appears that he currently has a prescription for Zyprexa waiting for him to  at [facility]. Will ensure that he is able to obtain his prescriptions at discharge, whether they be mailed or transferred to pharmacy closer to his current residence.        Interim History:     I found Andre awake in his room, but bed resting. He continues to deny any hallucinations or delusions despite my prodding. I did, again, pointedly ask him if he is hearing or seeing things that aren't there or if he feels paranoid, watched, or followed and he denied all of this. He denies depression, but is struggling with anxiety. I told him I was concerned about him isolating and declining to interact with others, but he again tells me he prefers not to mingle, \"especially in this institutionalized setting.\" He appears to be preoccupied and could certainly be responding to internal stimuli, though is still able to maintain control of his behavior. He does ask me if he is able to \"use that blue phone to call the bank,\" but I told him per our team meeting this morning, he could use the phone in the lounge during group time to " "make calls instead, as the [employer] phone is typically reserved for calls to , , or clergy. He declines to use the phone in the lounge saying \"It's not that important anyway.\"    He declined to make any medication changes because \"30 mg of Zyprexa is too much already.\" I did tell him 40 mg is the max daily dose and would be more than happy to add 10 mg in the morning, but he insists on continuing to utilize the PRN Seroquel instead, which he says \"helps with anxiety, it just needs some time to kick in.\" The patient denies any issues with sleep or appetite. Patient denies suicidal or homicidal ideation.     I did call his mother, Rafia, at the number we have on file in an attempt to gather some collateral information, but the phone just rang and rang, never going to voicemail. Patient states he plans to complete the Rule 25 as scheduled on Monday.      Educated regarding medication indications, risks, benefits, side effects, contraindications and possible interactions. Verbally expressed understanding.          Diagnoses:   Schizoaffective disorder- bipolar type (by history)  Alcohol use disorder, moderate        Attestation:  Patient has been seen and evaluated by me,  FERDINAND Lowe CNP      The patient's care was discussed with the treatment team and chart notes were reviewed.         Medications:     Current Facility-Administered Medications Ordered in Epic   Medication Dose Route Frequency Last Rate Last Admin     acetaminophen (TYLENOL) tablet 650 mg  650 mg Oral Q4H PRN         alum & mag hydroxide-simethicone (MAALOX) suspension 30 mL  30 mL Oral Q4H PRN         hydrOXYzine (ATARAX) tablet 25-50 mg  25-50 mg Oral Q4H PRN         melatonin tablet 3 mg  3 mg Oral At Bedtime PRN         nicotine (NICORETTE) gum 2 mg  2 mg Buccal Q1H PRN   2 mg at 08/20/21 0845     OLANZapine (zyPREXA) tablet 30 mg  30 mg Oral QPM   30 mg at 08/19/21 2003     QUEtiapine (SEROquel) tablet  mg  " " mg Oral BID PRN         senna-docusate (SENOKOT-S/PERICOLACE) 8.6-50 MG per tablet 1 tablet  1 tablet Oral BID PRN         No current [facility]-ordered outpatient medications on file.            10 point ROS- denies acute concerns       Allergies:   No Known Allergies         Psychiatric Examination:   /69 (BP Location: Left arm)   Pulse 85   Temp 97.1  F (36.2  C) (Temporal)   Resp 16   Ht 1.778 m (5' 10\")   Wt 68.9 kg (151 lb 12.8 oz)   SpO2 98%   BMI 21.78 kg/m    Weight is 151 lbs 12.8 oz  Body mass index is 21.78 kg/m .    Appearance: awake, alert, dressed in hospital scrubs, somewhat disheveled  Attitude: cooperative, but guarded  Eye Contact: fair  Mood: \"a little anxious\"  Affect: blunted  Speech:  clear, coherent  Psychomotor Behavior:  no evidence of tardive dyskinesia, dystonia, or tics  Thought Process:  linear  Associations:  no loose associations noted  Thought Content:  Denies suicidal and homicidal ideation as well as hallucinations and delusions, but does appear preoccupied and paranoid  Insight:  limited  Judgment:  limited  Oriented to:  time, person, and place  Attention Span and Concentration:  fair  Recent and Remote Memory:  intact  Fund of Knowledge: appropriate for education  Muscle Strength and Tone: normal  Gait and Station: Normal           Labs:     Results for orders placed or performed during the hospital encounter of 08/18/21 (from the past 24 hour(s))   Asymptomatic COVID-19 Virus (Coronavirus) by PCR Nasopharyngeal    Specimen: Nasopharyngeal; Swab   Result Value Ref Range    SARS CoV2 PCR Negative Negative    Narrative    Testing was performed using the Xpert Xpress SARS-CoV-2 Assay on the   [employer]. Additional information about   this Emergency Use Authorization (EUA) assay can be found via the Lab   Guide. This test should be ordered for the detection of SARS-CoV-2 in   individuals who meet SARS-CoV-2 clinical and/or epidemiological "   criteria. Test performance is unknown in asymptomatic patients. This   test is for in vitro diagnostic use under the FDA EUA for   laboratories certified under CLIA to perform high complexity testing.   This test has not been FDA cleared or approved. A negative result   does not rule out the presence of PCR inhibitors in the specimen or   target RNA in concentration below the limit of detection for the   assay. The possibility of a false negative should be considered if   the patient's recent exposure or clinical presentation suggests   COVID-19. This test was validated by [facility]. This laboratory is certified under the Clinical Laboratory Improve  ment Amendments (CLIA) as qualified to perform high complexity testing.         BEHAVIORAL TEAM DISCUSSION    Progress: Limited. Using scheduled Zyprexa and PRN Seroquel to treat symptoms of suspected psychosis. No interest in adjusting or changing medications. Not attending groups or even leaving his room, except for coming out to get his breakfast tray this morning     Continued Stay Criteria/Rationale: adjusting medications, placement/CD treatment TBD pending Rule 25 recommendations    Medical/Physical: none acute  Precautions:   Falls precaution?: No  Behavioral Orders   Procedures     Code 1 - Restrict to Unit     Routine Programming     As clinically indicated     Status 15     Every 15 minutes.     Plan:     Continue Zyprexa 30 mg at bedtime- not interested in adjusting dose or trialing anything new  Complete Rule 25 on Monday  Continue PRN Seroquel 100 mg TID PRN for anxiety/agitation  Per the treatment team: Patient needs to come out to the lounge to /drop off his meal trays, but can eat in his room. The team also decided he can use the phone in the lounge during group times due to not wanting to be around others. He cannot use the [employer] phone unless he is talking to his CM, , .        Participants:  Danette Segovia CNP, Nursing, SW, OT, Dr. Ad Morales

## 2021-08-28 PROCEDURE — 124N000001 HC R&B MH

## 2021-08-28 PROCEDURE — 250N000013 HC RX MED GY IP 250 OP 250 PS 637: Performed by: NURSE PRACTITIONER

## 2021-08-28 RX ADMIN — QUETIAPINE FUMARATE 100 MG: 100 TABLET ORAL at 08:23

## 2021-08-28 RX ADMIN — NICOTINE 1 PATCH: 14 PATCH, EXTENDED RELEASE TRANSDERMAL at 08:25

## 2021-08-28 RX ADMIN — OLANZAPINE 30 MG: 10 TABLET, FILM COATED ORAL at 20:00

## 2021-08-28 ASSESSMENT — ACTIVITIES OF DAILY LIVING (ADL)
HYGIENE/GROOMING: INDEPENDENT
ORAL_HYGIENE: INDEPENDENT
ORAL_HYGIENE: INDEPENDENT
HYGIENE/GROOMING: INDEPENDENT
DRESS: INDEPENDENT
DRESS: SCRUBS (BEHAVIORAL HEALTH);INDEPENDENT

## 2021-08-28 NOTE — PLAN OF CARE
Face to face shift report received from Ema ROSALES RN. Rounding completed, pt observed.    Problem: Behavioral Health Plan of Care  Goal: Patient-Specific Goal (Individualization)  Description: Pt will sleep at least 6-8 hours per NOC shift.  Pt will eat at least 50% of meals.  Pt will be medication compliant.  Pt will follow treatment team recommendations,  Pt will attend at least 50% of groups.  Pt will get his own meals trays.  Pt can use phone in lounge during group times due to paranoia.    Outcome: No Change  Note: Shift Summery:  Patient was awake in his room at the start of this shift.  Patient remains isolative and guarded but [facility].  Seroquel 100 mg po administered at 0823 to help decrease anxiety and paranoia. Patient did come out without staff to get lunch tray after prompted to do so.  Denies pain, unwanted side effects.  .  ADLs improving.     Problem: Thought Process Alteration  Goal: Optimal Thought Clarity  Description: Pt will report no paranoid statements by discharge.  Pt will be able to maintain a linear and reality based conversation by discharge    Outcome: No Change     Problem: Depression  Goal: Improved Mood  Description: Pt will report manageable levels of depression and anxiety by discharge.  Pt will contract for safety while on the unit.  Pt will report worsening of symptoms and any suicidal ideation.  Outcome: No Change   Face to face report will be communicated to oncoming RN.    Ashley Hunter RN  8/28/2021

## 2021-08-28 NOTE — PLAN OF CARE
Problem: Behavioral Health Plan of Care  Goal: Patient-Specific Goal (Individualization)  Description: Pt will sleep at least 6-8 hours per NOC shift.  Pt will eat at least 50% of meals.  Pt will be medication compliant.  Pt will follow treatment team recommendations,  Pt will attend at least 50% of groups.  Pt will get his own meals trays.  Pt can use phone in lounge during group times due to paranoia.    8/27/2021 2222 by Day Paige, RN  Outcome: No Change  Note: Pt has been isolative and withdrawn to his room tonight. He spent all shift resting in bed. He did come out of his room to grab his supper tray; however, he presented as paranoid as he would only walk part way down the pierce to avoid being seen by peers. He gestured to staff to bring his tray to where he was standing in the pierce. During nursing assessment, he presented as guarded. He denied depression, hallucinations, delusions and paranoid thinking. He endorsed anxiety. He was compliant with his scheduled medications.    Face to face end of shift report communicated to oncoming RN.      Problem: Thought Process Alteration  Goal: Optimal Thought Clarity  Description: Pt will report no paranoid statements by discharge.  Pt will be able to maintain a linear and reality based conversation by discharge    Outcome: No Change  Note: Pt was able to maintain a linear, reality based conversation. He denied paranoia; however, presented as paranoid.      Problem: Depression  Goal: Improved Mood  Description: Pt will report manageable levels of depression and anxiety by discharge.  Pt will contract for safety while on the unit.  Pt will report worsening of symptoms and any suicidal ideation.  Outcome: Improving  Note: Pt denied depression and suicidal ideation. He remained free from self harm.

## 2021-08-29 PROCEDURE — 250N000013 HC RX MED GY IP 250 OP 250 PS 637: Performed by: NURSE PRACTITIONER

## 2021-08-29 PROCEDURE — 124N000001 HC R&B MH

## 2021-08-29 RX ADMIN — NICOTINE 1 PATCH: 14 PATCH, EXTENDED RELEASE TRANSDERMAL at 08:51

## 2021-08-29 RX ADMIN — QUETIAPINE FUMARATE 100 MG: 100 TABLET ORAL at 08:51

## 2021-08-29 RX ADMIN — OLANZAPINE 30 MG: 10 TABLET, FILM COATED ORAL at 20:34

## 2021-08-29 ASSESSMENT — ACTIVITIES OF DAILY LIVING (ADL)
HYGIENE/GROOMING: INDEPENDENT
ORAL_HYGIENE: INDEPENDENT
DRESS: SCRUBS (BEHAVIORAL HEALTH);INDEPENDENT
ORAL_HYGIENE: INDEPENDENT
HYGIENE/GROOMING: INDEPENDENT
DRESS: INDEPENDENT

## 2021-08-29 NOTE — PLAN OF CARE
Face to face shift report received from Velvet ROSALES RN Rounding completed, pt observed.    Problem: Thought Process Alteration  Goal: Optimal Thought Clarity  Description: Pt will report no paranoid statements by discharge.  Pt will be able to maintain a linear and reality based conversation by discharge    Outcome: No Change     Problem: Behavioral Health Plan of Care  Goal: Patient-Specific Goal (Individualization)  Description: Pt will sleep at least 6-8 hours per NOC shift.  Pt will eat at least 50% of meals.  Pt will be medication compliant.  Pt will follow treatment team recommendations,  Pt will attend at least 50% of groups.  Pt will get his own meals trays.  Pt can use phone in lounge during group times due to paranoia.    Outcome: Improving  Note: Shift Summery:  Pt is awake in his room. Pt walked out of room to get breakfast tray. Seroquel 100mg PO at 0851 to help reduce anxiety and paranoia. Denies pain and unwanted side effects. Pt knows he is doing rule 25 tomorrow. Pt stayed in room only coming out to get meal trays.          Problem: Depression  Goal: Improved Mood  Description: Pt will report manageable levels of depression and anxiety by discharge.  Pt will contract for safety while on the unit.  Pt will report worsening of symptoms and any suicidal ideation.  Outcome: Improving   Face to face report will be communicated to oncoming RN.    Therese Espitia RN  8/29/2021

## 2021-08-29 NOTE — PLAN OF CARE
Face to face shift report received from Day KENT RN. Rounding completed, pt observed in room appeared to be sleeping, in no apparent distress.  Respirations are even and non labored.   15 min/prn safety checks continue.       Problem: Behavioral Health Plan of Care  Goal: Patient-Specific Goal (Individualization)  Description: Pt will sleep at least 6-8 hours per NOC shift.  Pt will eat at least 50% of meals.  Pt will be medication compliant.  Pt will follow treatment team recommendations,  Pt will attend at least 50% of groups.  Pt will get his own meals trays.  Pt can use phone in lounge during group times due to paranoia.    Outcome: Improving   Pt slept approx. 7 hours during the night.      Face to face report will be communicated to oncoming RN.    Ema Dwyer RN  8/29/2021  6:15 AM

## 2021-08-29 NOTE — PLAN OF CARE
Problem: Behavioral Health Plan of Care  Goal: Patient-Specific Goal (Individualization)  Description: Pt will sleep at least 6-8 hours per NOC shift.  Pt will eat at least 50% of meals.  Pt will be medication compliant.  Pt will follow treatment team recommendations,  Pt will attend at least 50% of groups.  Pt will get his own meals trays.  Pt can use phone in lounge during group times due to paranoia.    8/28/2021 2257 by Day Paige RN  Outcome: No Change  Note: Pt had a blunted, flat affect. He endorsed anxiety and paranoia. He was guarded and did not elaborate when questioned. He has been isolative and withdrawn to his room. The only time he came out of his room was to grab his supper tray. He ate 100%. He was compliant with his scheduled medication.    Face to face end of shift report communicated to oncoming RN.      Problem: Thought Process Alteration  Goal: Optimal Thought Clarity  Description: Pt will report no paranoid statements by discharge.  Pt will be able to maintain a linear and reality based conversation by discharge  Outcome: Improving  Note: Pt endorsed paranoia. He was guarded and offered minimal conversation.      Problem: Depression  Goal: Improved Mood  Description: Pt will report manageable levels of depression and anxiety by discharge.  Pt will contract for safety while on the unit.  Pt will report worsening of symptoms and any suicidal ideation.  Outcome: Improving  Note: Pt denied depression and suicidal ideation. He endorsed anxiety.

## 2021-08-29 NOTE — PLAN OF CARE
Face to face shift report received from Ema ROSALES RN. Rounding completed, pt observed.    Problem: Behavioral Health Plan of Care  Goal: Patient-Specific Goal (Individualization)  Description: Pt will sleep at least 6-8 hours per NOC shift.  Pt will eat at least 50% of meals.  Pt will be medication compliant.  Pt will follow treatment team recommendations,  Pt will attend at least 50% of groups.  Pt will get his own meals trays.  Pt can use phone in lounge during group times due to paranoia.    Outcome: Improving  Note: Shift Summery:           Problem: Thought Process Alteration  Goal: Optimal Thought Clarity  Description: Pt will report no paranoid statements by discharge.  Pt will be able to maintain a linear and reality based conversation by discharge    Outcome: No Change     Problem: Depression  Goal: Improved Mood  Description: Pt will report manageable levels of depression and anxiety by discharge.  Pt will contract for safety while on the unit.  Pt will report worsening of symptoms and any suicidal ideation.  Outcome: No Change   Face to face report will be communicated to oncoming RN.    Ashley Hunter RN  8/29/2021

## 2021-08-30 PROCEDURE — 250N000013 HC RX MED GY IP 250 OP 250 PS 637: Performed by: NURSE PRACTITIONER

## 2021-08-30 PROCEDURE — 124N000001 HC R&B MH

## 2021-08-30 PROCEDURE — 99232 SBSQ HOSP IP/OBS MODERATE 35: CPT | Performed by: NURSE PRACTITIONER

## 2021-08-30 RX ADMIN — QUETIAPINE FUMARATE 100 MG: 100 TABLET ORAL at 08:45

## 2021-08-30 RX ADMIN — OLANZAPINE 30 MG: 10 TABLET, FILM COATED ORAL at 21:10

## 2021-08-30 RX ADMIN — NICOTINE 1 PATCH: 14 PATCH, EXTENDED RELEASE TRANSDERMAL at 08:45

## 2021-08-30 ASSESSMENT — ACTIVITIES OF DAILY LIVING (ADL)
DRESS: SCRUBS (BEHAVIORAL HEALTH);INDEPENDENT
HYGIENE/GROOMING: INDEPENDENT;SHOWER
LAUNDRY: UNABLE TO COMPLETE
DRESS: INDEPENDENT;SCRUBS (BEHAVIORAL HEALTH)
ORAL_HYGIENE: INDEPENDENT
HYGIENE/GROOMING: INDEPENDENT

## 2021-08-30 NOTE — PLAN OF CARE
Problem: Behavioral Health Plan of Care  Goal: Patient-Specific Goal (Individualization)  Description: Pt will sleep at least 6-8 hours per NOC shift.  Pt will eat at least 50% of meals.  Pt will be medication compliant.  Pt will follow treatment team recommendations,  Pt will attend at least 50% of groups.  Pt will get his own meals trays.  Pt can use phone in lounge during group times due to paranoia.    8/30/2021 0009 by Day Paige RN  Outcome: Improving  Note: Pt had a blunted, flat affect. He was guarded. He endorsed paranoia and anxiety. He was isolative and withdrawn. He only came out of his room to grab his supper tray. He ate 100% of his supper. He was compliant with his scheduled 30 mg of Zyprexa.    Face to face end of shift report communicated to oncoming RN.     Problem: Thought Process Alteration  Goal: Optimal Thought Clarity  Description: Pt will report no paranoid statements by discharge.  Pt will be able to maintain a linear and reality based conversation by discharge  Outcome: No Change  Note: Pt was guarded. He endorsed paranoia.      Problem: Depression  Goal: Improved Mood  Description: Pt will report manageable levels of depression and anxiety by discharge.  Pt will contract for safety while on the unit.  Pt will report worsening of symptoms and any suicidal ideation.  Outcome: Improving  Note: Pt denied depression and suicidal ideation.

## 2021-08-30 NOTE — PLAN OF CARE
"Type Of Assessment: Inpatient Substance Use Comprehensive Assessment    Referral Source:  Self  MRN: [id number]    DATE OF SERVICE: 2021  Date of previous GAVIN Assessment: Two months ago at [facility] in [place] - offered to coordinate for patient to do an update with previous provider rather than a full new assessment, and he declined, stating the person doesn't work there anymore and he'd rather do a new one.   Patient confirmed identity through two factor verification:  and SSN    PATIENT'S NAME: Andre Bustos  Age: 46 year old  Last 4 SSN: [id number]  Sex: male   Gender Identity: male  Sexual Orientation: Heterosexual  Cultural Background: No, Denies any cultural influences or concerns that need to be considered for treatment  YOB: 1975  Current Address:   Homeless - patient reports he had been staying with his mom but states that she won't let him go back to her home until he goes to treatment - patient reports he feels his problem is more mental health than substance use.   Patient Phone Number:  [phone]  Patient's E-Mail Contact:  N/A  Funding: \"[employer]\"  PMI: [id number]  Emergency Contact: Rafia Freitas [phone]        START TIME: 1:00pm  END TIME: 1:28pm      Andre Bustos was seen face to face for a substance use disorder consult on 2021 by DELORIS Chavez.    Reason for Substance Use Disorder Consult:  Pt reports he is homeless due to his mother telling him he is not allowed to return to her home unless he does an assessment and treatment.     Are you currently having severe withdrawal symptoms that are putting yourself or others in danger? No  Are you currently having severe medical problems that require immediate attention? No  Are you currently having severe emotional or behavioral problems that are putting yourself or others at risk of harm? No    Have you participated in prior substance use disorder evaluations? Yes. When, Where, and What " "circumstances: 2 months ago - [facility] - reports he was \"looking for a place\" and had been staying with his mom. States he had \"slipped up\" a few days prior and his mother \"freaked out.\"    Have you ever been to detox, inpatient or outpatient treatment for substance related use? List previous treatment: Yes. When, Where, and What circumstances: 30 years ago here at the [facility], then [facility] in the mid-90's   Have you ever had a gambling problem or had treatment for compulsive gambling? No  Patient does not appear to be in severe withdrawal, an imminent safety risk to self or others, or requiring immediate medical attention and may proceed with the assessment interview.    Comprehensive Substance Use History   X X = Primary Drug Used Age of First Use    Pattern of Substance Use   (heaviest use in life and a use history within the past year if applicable) (DSM-5: Sx #3) Date /  Quantity of last use if within the past 30 days Withdrawal Potential?   Method of use  (Oral, smoked, snorted, IV, etc)   X Alcohol   15 4 times in the last 6 months - \"a six pack or a pint\"  About a month ago  Oral    Marijuana/Hashish   15  Mid-20's  Smoke    Cocaine/Crack No use        Meth/Amphetamines   34 \"Amphetamine\" medication - denies use of methamphetamine Age 34      Heroin   No use        Other Opiates/Synthetics   No use        Inhalants  No use        Benzodiazepines   No use        Hallucinogens   19 \"Mushrooms\" one-time use Age 19      Barbiturates/Sedatives/Hypnotics   No use        Over-the-Counter Drugs   No use        Other   No use        Nicotine   15 1/2 pack per day   Smoke     Withdrawal symptoms: Have you had any of the following withdrawal symptoms?  None    Have you experienced any cravings?  Yes \"Only when stuff got me depressed and something bad happened.\"     Have you had periods of abstinence?  Yes  What was your longest period? Estimates 6 months  Helpful: \"Being around the right people.\" " "    Any circumstances that lead to relapse? \"I've seen a lot of stuff.\" Reports trauma.     What activities have you engaged in when using alcohol/other drugs that could be hazardous to you or others? (i.e. driving a car/motorcycle/boat, operating machinery, unsafe sex, sharing needles for drugs or tattoos, etc ) The patient reported having a history of driving while under the influence of alcohol or drugs.    A description of any risk-taking behavior, including behavior that puts the client at risk of exposure to blood-borne or sexually transmitted diseases: Denies activities putting him at risk for blood borne or sexually transmitted diseases.     Arrests and legal interventions related to substance use: 3 DUI's, \"Zimmerman theft, zimmerman trespasses, zimmerman drinking in public or alcohol in the park, a couple zimmerman disorderly's\"     A description of how the patient's use affected their ability to function appropriately in a work setting: Reports he got on disability for mental health about fifteen years ago. States prior to that he had worked jobs for short periods of time - identifies as \"private\" and reported not staying long at jobs due to \"the crowd\". This appears related to patient being isolative - notes indicate he does not often leave his room, including eating meals in his room and deciding not to make phone calls as that would require him to make them in the community area of the unit.     A description of how the patient's use affected their ability to function appropriately in an educational setting: N/A    Leisure time activities that are associated with substance use: \"Barbeque, I guess.\"     Do you think your substance use has become a problem for you? He agrees he has a substance abuse problem.    MEDICAL HISTORY  Physical or medical concerns or diagnoses: \"I had a seizure two years ago\" reports he doesn't know what caused it. States \"drinking for awhile caught up to me.\" Denies any other medical concerns. " "    Do you have any current medical treatment needs not being addressed by inpatient treatment?  No    Do you need a referral for a medical provider? Reports he is engaged in care at [facility]     Current medications:   Patient reports current meds as:   Outpatient Medications Marked as Taking for the 8/18/21 encounter (Hospital Encounter)   Medication Sig     nicotine polacrilex (NICORETTE) 4 MG gum Take 4 mg by mouth as needed     OLANZapine (ZYPREXA) 15 MG tablet Take 30 mg by mouth every evening          Are you pregnant? NA, Male    Do you have any specific physical needs/accommodations? No    MENTAL HEALTH HISTORY:  Have you ever had  hospitalizations or treatment for mental health illness: Yes. When, Where, and What circumstances: 2005 and current hospitalization.     Mental health history, including diagnosis and symptoms, and the effect on the client's ability to function: Bipolar and schizoaffective disorders. Symptoms - Not liking being in groups or around people. States it makes it difficult for him to open up to people.     Current mental health treatment including psychotropic medication needed to maintain stability: (Note: The assessment must utilize screening tools approved by the commissioner pursuant to section 245.4863 to identify whether the client screens positive for co-occurring disorders): Reports he has a mental health doctor at [facility] and is scheduled for outpatient appointments in [place] after hospitalization. Reports he has been on Zyprexa for 15 years and reports he feels he needs this to function. Reports he has an appointment set up for therapy at \"[facility] in [place]\" in a month.     GAIN-SS Tool:  No flowsheet data found.No flowsheet data found.    Have you ever been verbally, emotionally, physically or sexually abused?   No    Family history of substance use and misuse: \"My dad quit drinking 40 years ago.\"     The patient's desire for family involvement in the " "treatment program: Would like his mother involved.   Level of family support: Reports she talks to his doctor at [facility] [address] [facility] and \"that's good enough.\"     Social network in relation to expected support for recovery: Denies any social supports.     Are you currently in a significant relationship? No    Do you have any children (include living arrangements/custody/contact)?:  None    What is your current living situation? Had been living with his mother, however she told him he cannot return to her home unless he completes this assessment and does treatment.     Are you employed/attending school? Disabled    SUMMARY:  Ability to understand written treatment materials: Yes  Ability to understand patient rules and patient rights: Yes  Does the patient recognize needs related to substance use and is willing to follow treatment recommendations: \"I'll have to see, yeah.\"   Does the patient have an opioid use disorder:  does not have a history of opiate use.    ASAM Dimension Scale Ratings:  Dimension 1: 0 Client displays full functioning with good ability to tolerate and cope with withdrawal discomfort. No signs or symptoms of intoxication or withdrawal or resolving signs or symptoms.  Dimension 2: 0 Client displays full functioning with good ability to cope with physical discomfort.  Dimension 3: 2 Client has difficulty with impulse control and lacks coping skills. Client has thoughts of suicide or harm to others without means; however, the thoughts may interfere with participation in some treatment activities. Client has difficulty functioning in significant life areas. Client has moderate symptoms of emotional, behavioral, or cognitive problems. Client is able to participate in most treatment activities.  Dimension 4: 2 Client displays verbal compliance, but lacks consistent behaviors; has low motivation for change; and is passively involved in treatment.  Dimension 5: 2 (A) Client has minimal recognition and " "understanding of relapse and recidivism issues and displays moderate vulnerability for further substance use or mental health problems. (B) Client has some coping skills inconsistently applied.  Dimension 6: 4 Client has (A) Chronically antagonistic significant other, living environment, family, peer group or long-term criminal justice involvement that is harmful to recovery or treatment progress, or (B) Client has an actively antagonistic significant other, family, work, or living environment with immediate threat to the client's safety and well-being.    Category of Substance Severity (ICD-10 Code / DSM 5 Code)     Alcohol Use Disorder Moderate  (F10.20) (303.90)   Cannabis Use Disorder The patient does not currently meet the criteria for an Cannabis use disorder, but has a history of use of marijuana in his 20's.   Hallucinogen Use Disorder The patient does not currently meet the criteria for a Hallucinogen use disorder, but has a history of a one-time use of \"mushrooms.\"   Inhalant Use Disorder The patient does not meet the criteria for an Inhalant use disorder.   Opioid Use Disorder The patient does not meet the criteria for an Opioid use disorder.   Sedative, Hypnotic, or Anxiolytic Use Disorder The patient does not meet the criteria for a Sedative/Hypnotic use disorder.   Stimulant Related Disorder The patient does not currently meet the criteria for a Stimulant use disorder, but has a history of abuse of amphetamine medication \"over 10 years ago.\"   Tobacco Use Disorder The patient does not meet the criteria for a Tobacco use disorder.   Other (or unknown) Substance Use Disorder The patient does not meet the criteria for a Other (or unknown) Substance use disorder.     A problematic pattern of alcohol/drug use leading to clinically significant impairment or distress, as manifested by at least two of the following, occurring within a 12-month period:    2.) There is a persistent desire or unsuccessful efforts " to cut down or control alcohol/drug use  4.) Craving, or a strong desire or urge to use alcohol/drug  6.) Continued alcohol use despite having persistent or recurrent social or interpersonal problems caused or exacerbated by the effects of alcohol/drug.  8.) Recurrent alcohol/drug use in situations in which it is physically hazardous.  9.) Alcohol/drug use is continued despite knowledge of having a persistent or recurrent physical or psychological problem that is likely to have been caused or exacerbated by alcohol.    Specify if: In early remission:  After full criteria for alcohol/drug use disorder were previously met, none of the criteria for alcohol/drug use disorder have been met for at least 3 months but for less than 12 months (with the exception that Criterion A4,  Craving or a strong desire or urge to use alcohol/drug  may be met).     In sustained remission:   After full criteria for alcohol use disorder were previously met, non of the criteria for alcohol/drug use disorder have been met at any time during a period of 12 months or longer (with the exception that Criterion A4,  Craving or strong desire or urge to use alcohol/drug  may be met).     Specify if:   This additional specifier is used if the individual is in an environment where access to alcohol is restricted.    Mild: Presence of 2-3 symptoms  Moderate: Presence of 4-5 symptoms  Severe: Presence of 6 or more symptoms    Collateral information: GAVIN Collateral Info: Spoke to pt's mother who indicates patient is drinking more often than he had indicated. Mother states she has found empty vodka bottles in patient's room, and reported one incident over the summer where she took pt to a medical appt in [place], and by the time they arrived in [place] the pt was so intoxicated he was unable to attend the appt. Mother suspects he had been drinking in the restrooms they stopped at on the way to the appt. Mother reports this has been an ongoing issue for  "20-25 years. Mother confirms pt had an alcohol related seizure about two years ago, at which time the pt was offered inpatient treatment and he refused. Mother reports she is \"affraid he's going to die.\" Mother would like to see him get into a program where he will get help with both mental health and chemical dependency. Mother confirms that patient's avoidance of groups of people has been ongoing since childhood.     Recommendations:   1. The patient is recommended to abstain from the use of non-prescribed mood-altering substances.   2. The patient is recommended to attend any scheduled or preventative medical appointments necessary to maintain healthy self-care. The patient is recommended to follow all recommendations of his medical providers, including taking medications as prescribed.   3. The patient is recommended to follow all recommendations of his mental health providers. Pt is also recommended to complete a MH diagnostic assessment if he does not already have a current assessment on file, to determine whether additional mental health services may be beneficial.   4. The patient is recommended to enter and successfully complete a residential MICD program and follow discharge recommendations.     Resources:    [facility] [phone]      GAVIN consult completed by: La Arias [facility].  Phone Number: [phone] ext. [phone]  E-mail Address: [email or web address]  [address] [address]    *Due to regulation of Title 42 of the Code of Federal Regulations (CFR) Part 2: Confidentiality laws apply to this note and the information wherein.  Thus, this note cannot be copy and pasted into any other health care staff's note nor can it be included in general medical records sent to ANY outside agency without the patient's written consent.          "

## 2021-08-30 NOTE — PLAN OF CARE
Problem: Behavioral Health Plan of Care  Goal: Patient-Specific Goal (Individualization)  Description: Pt will sleep at least 6-8 hours per NOC shift.  Pt will eat at least 50% of meals.  Pt will be medication compliant.  Pt will follow treatment team recommendations,  Pt will attend at least 50% of groups.  Pt will get his own meals trays.  Pt can use phone in lounge during group times due to paranoia.    Outcome: Improving       Problem: Thought Process Alteration  Goal: Optimal Thought Clarity  Description: Pt will report no paranoid statements by discharge.  Pt will be able to maintain a linear and reality based conversation by discharge      Outcome: Improving     Problem: Depression  Goal: Improved Mood  Description: Pt will report manageable levels of depression and anxiety by discharge.  Pt will contract for safety while on the unit.  Pt will report worsening of symptoms and any suicidal ideation.  Outcome: Improving       Face to face shift report received from DAVID Boo. Rounding completed, pt observed laying in bed.     Patient ate 100% breakfast & lunch. He did not attend group this AM and was doing his rule 25 this afternoon.     He stated his anxiety at a 5/10 this morning requested his PRN Seroquel 100mg @ 0845. denied depression, SI/HI, hallucinations and pain.    Face to face report will be communicated to oncoming RN.    Cece Messer RN  8/30/2021  1:22 PM

## 2021-08-30 NOTE — PROGRESS NOTES
[facility]  Psychiatric Progress Note      Impression:   This is a 46 year old yo male with a history of schizoaffective disorder- bipolar type and alcohol abuse. Mother brought him to the ED due to increase in paranoia and hallucinations. Reported that he had been hiding in their basement in the dark for past 2 weeks, believes that there are people after him. Patient reports that he has been compliant with his Zyprexa, though mother indicates that he has not been taking it. It does appear that he has remained relatively stable on Zyprexa for at least the last 10 years, so it is likely that he was not as compliant as he reports. He did recently move to his mother's home near [place], [place] in the past 2 months from [place] where he had been homeless. Current psych provider is still in [place] and per documentation it appears that he currently has a prescription for Zyprexa waiting for him to  at [facility]. Will ensure that he is able to obtain his prescriptions at discharge, whether they be mailed or transferred to pharmacy closer to his current residence.        Interim History:     I found Andre awake in his room. He proudly tells me that he went out to the lounge and got some books today. I thanked him for his willingness to work with us and push himself past his comfort level in regards to socializing with others. He has been going out to the lounge to  and drop off his meal trays, but otherwise isolates in his room. I did tell Andre that in order for him to go to an IRTS or CD treatment, the expectation is that he attend groups and participate in their programming. I encouraged him to take baby steps by going out and sitting in the lounge today and consider sitting in on a group. He isn't too keen on this idea, but I reminded him that at this time, the only other option is a homeless shelter.     I spoke with La PUENTES who conducted his Rule 25 and was able to contact his mother. His mother  reports that he significantly downplays his drinking and gives an example of how she drove him to [place] for a medical appointment and he made frequent requests to stop and use the restroom along the way. By the time they made it to [place], he was intoxicated that he couldn't participate in his appointment. She reports he doesn't drink every day, but does have a history of alcohol withdrawal seizures and has been treated for this in [place]. She shares that his paranoia increased pretty dramatically about 1.5 months ago and he started to act strange and isolate.       Educated regarding medication indications, risks, benefits, side effects, contraindications and possible interactions. Verbally expressed understanding.          Diagnoses:   Schizoaffective disorder- bipolar type (by history)  Alcohol use disorder, moderate        Attestation:  Patient has been seen and evaluated by me,  FERDINAND Lowe CNP      The patient's care was discussed with the treatment team and chart notes were reviewed.         Medications:     Current Facility-Administered Medications Ordered in Epic   Medication Dose Route Frequency Last Rate Last Admin     acetaminophen (TYLENOL) tablet 650 mg  650 mg Oral Q4H PRN         alum & mag hydroxide-simethicone (MAALOX) suspension 30 mL  30 mL Oral Q4H PRN         hydrOXYzine (ATARAX) tablet 25-50 mg  25-50 mg Oral Q4H PRN         melatonin tablet 3 mg  3 mg Oral At Bedtime PRN         nicotine (NICORETTE) gum 2 mg  2 mg Buccal Q1H PRN   2 mg at 08/20/21 0845     OLANZapine (zyPREXA) tablet 30 mg  30 mg Oral QPM   30 mg at 08/19/21 2003     QUEtiapine (SEROquel) tablet  mg   mg Oral BID PRN         senna-docusate (SENOKOT-S/PERICOLACE) 8.6-50 MG per tablet 1 tablet  1 tablet Oral BID PRN         No current [facility]-ordered outpatient medications on file.        10 point ROS- denies acute concerns       Allergies:   No Known Allergies         Psychiatric Examination:   /91  "(BP Location: Left arm)   Pulse 83   Temp 97.8  F (36.6  C) (Temporal)   Resp 14   Ht 1.778 m (5' 10\")   Wt 68.9 kg (151 lb 12.8 oz)   SpO2 98%   BMI 21.78 kg/m    Weight is 151 lbs 12.8 oz  Body mass index is 21.78 kg/m .    Appearance: awake, alert, dressed in hospital scrubs, somewhat disheveled  Attitude: cooperative, but guarded  Eye Contact: fair  Mood: \"a little anxious\"  Affect: blunted  Speech:  clear, coherent  Psychomotor Behavior:  no evidence of tardive dyskinesia, dystonia, or tics  Thought Process:  linear  Associations:  no loose associations noted  Thought Content:  Denies suicidal and homicidal ideation as well as hallucinations and delusions, but does appear preoccupied and paranoid  Insight:  limited  Judgment:  limited  Oriented to:  time, person, and place  Attention Span and Concentration:  fair  Recent and Remote Memory:  intact  Fund of Knowledge: appropriate for education  Muscle Strength and Tone: normal  Gait and Station: Normal           Labs:     No results found for this or any previous visit (from the past 24 hour(s)).      BEHAVIORAL TEAM DISCUSSION    Progress: Limited. Using scheduled Zyprexa and PRN Seroquel to treat symptoms of suspected psychosis. No interest in adjusting or changing medications. Not attending groups or even leaving his room, except for coming out to get his breakfast tray this morning     Continued Stay Criteria/Rationale: adjusting medications, placement/CD treatment TBD pending Rule 25 recommendations    Medical/Physical: none acute  Precautions:   Falls precaution?: No  Behavioral Orders   Procedures     Code 1 - Restrict to Unit     Routine Programming     As clinically indicated     Status 15     Every 15 minutes.     Plan:     Continue Zyprexa 30 mg at bedtime- not interested in adjusting dose or trialing anything new  Complete Rule 25 on Monday  Continue PRN Seroquel 100 mg TID PRN for anxiety/agitation  Per the treatment team: Patient needs to " come out to the lounge to /drop off his meal trays, but can eat in his room. The team also decided he can use the phone in the lounge during group times due to not wanting to be around others. He cannot use the [employer] phone unless he is talking to his CM, , .        Participants: Danette Segovia CNP, Nursing, SW, OT, Dr. Ad Morales

## 2021-08-30 NOTE — PLAN OF CARE
Face to face shift report received from Day KENT RN.   Rounding completed, pt observed in room appears to be sleeping, in no apparent distress. Respirations are even and non labored.  15 min/prn safety checks continue.        Problem: Behavioral Health Plan of Care  Goal: Patient-Specific Goal (Individualization)  Description: Pt will sleep at least 6-8 hours per NOC shift.  Pt will eat at least 50% of meals.  Pt will be medication compliant.  Pt will follow treatment team recommendations,  Pt will attend at least 50% of groups.  Pt will get his own meals trays.  Pt can use phone in lounge during group times due to paranoia.    Outcome: Improving   Pt slept approx. 7 hours during the night.  Stated this AM that he awoke in a panic attack.  Requested and received propanolol.      Face to face report will be communicated to oncoming RN.    Ema Dwyer RN  8/30/2021  6:10 AM

## 2021-08-31 PROCEDURE — 250N000013 HC RX MED GY IP 250 OP 250 PS 637: Performed by: NURSE PRACTITIONER

## 2021-08-31 PROCEDURE — 99232 SBSQ HOSP IP/OBS MODERATE 35: CPT | Performed by: NURSE PRACTITIONER

## 2021-08-31 PROCEDURE — 124N000001 HC R&B MH

## 2021-08-31 RX ADMIN — NICOTINE 1 PATCH: 14 PATCH, EXTENDED RELEASE TRANSDERMAL at 08:43

## 2021-08-31 RX ADMIN — NICOTINE POLACRILEX 2 MG: 2 GUM, CHEWING ORAL at 08:48

## 2021-08-31 RX ADMIN — OLANZAPINE 30 MG: 10 TABLET, FILM COATED ORAL at 20:22

## 2021-08-31 RX ADMIN — QUETIAPINE FUMARATE 100 MG: 100 TABLET ORAL at 08:47

## 2021-08-31 ASSESSMENT — ACTIVITIES OF DAILY LIVING (ADL)
ORAL_HYGIENE: INDEPENDENT
HYGIENE/GROOMING: INDEPENDENT
ORAL_HYGIENE: INDEPENDENT
HYGIENE/GROOMING: INDEPENDENT
DRESS: SCRUBS (BEHAVIORAL HEALTH);INDEPENDENT
LAUNDRY: UNABLE TO COMPLETE
DRESS: SCRUBS (BEHAVIORAL HEALTH);INDEPENDENT

## 2021-08-31 NOTE — PROGRESS NOTES
"[facility]  Psychiatric Progress Note      Impression:   This is a 46 year old yo male with a history of schizoaffective disorder- bipolar type and alcohol abuse. Mother brought him to the ED due to increase in paranoia and hallucinations. Reported that he had been hiding in their basement in the dark for past 2 weeks, believes that there are people after him. Patient reports that he has been compliant with his Zyprexa, though mother indicates that he has not been taking it. It does appear that he has remained relatively stable on Zyprexa for at least the last 10 years, so it is likely that he was not as compliant as he reports. He did recently move to his mother's home near [place], [place] in the past 2 months from [place] where he had been homeless. Current psych provider is still in [place] and per documentation it appears that he currently has a prescription for Zyprexa waiting for him to  at [facility]. Will ensure that he is able to obtain his prescriptions at discharge, whether they be mailed or transferred to pharmacy closer to his current residence.        Interim History:     Andre was in his room reading a book. He did attend one group this morning. Encouraged to attend more. He reported that the group this morning made him very anxious. We did discuss the fact that he would need to be attending groups in order to go to treatment, and because we are doing no medication changes or other therapies that warrant hospitalization, he will need to attend groups to remain in the hospital until he can go to treatment. He stated that he would \"try to get out of my room for lunch,\" and then went on about this being concerning secondary to COVID. He was asked if he's been vaccinated but became upset, \"No, and I don't want that at all.\" Reminded that he needs to actually attend groups, not just leave his room for meals. \"Ok, fine.\" Denied further questions or concerns.          Diagnoses:   Schizoaffective " "disorder- bipolar type (by history)  Alcohol use disorder, moderate        Attestation:  Patient has been seen and evaluated by me,  Clifton Rangel NP      The patient's care was discussed with the treatment team and chart notes were reviewed.         Medications:     Current Facility-Administered Medications Ordered in Epic   Medication Dose Route Frequency Last Rate Last Admin     acetaminophen (TYLENOL) tablet 650 mg  650 mg Oral Q4H PRN         alum & mag hydroxide-simethicone (MAALOX) suspension 30 mL  30 mL Oral Q4H PRN         hydrOXYzine (ATARAX) tablet 25-50 mg  25-50 mg Oral Q4H PRN         melatonin tablet 3 mg  3 mg Oral At Bedtime PRN         nicotine (NICORETTE) gum 2 mg  2 mg Buccal Q1H PRN   2 mg at 08/20/21 0845     OLANZapine (zyPREXA) tablet 30 mg  30 mg Oral QPM   30 mg at 08/19/21 2003     QUEtiapine (SEROquel) tablet  mg   mg Oral BID PRN         senna-docusate (SENOKOT-S/PERICOLACE) 8.6-50 MG per tablet 1 tablet  1 tablet Oral BID PRN         No current [facility]-ordered outpatient medications on file.        10 point ROS- denies acute concerns       Allergies:   No Known Allergies         Psychiatric Examination:   /72   Pulse 69   Temp 98.7  F (37.1  C) (Tympanic)   Resp 16   Ht 1.778 m (5' 10\")   Wt 68.9 kg (151 lb 12.8 oz)   SpO2 97%   BMI 21.78 kg/m    Weight is 151 lbs 12.8 oz  Body mass index is 21.78 kg/m .    Appearance: Awake, alert, sitting on bed reading book.  Attitude: cooperative, remains guarded  Eye Contact: fair  Mood: \"still anxious\"  Affect: blunted  Speech:  clear, coherent  Psychomotor Behavior:  no evidence of tardive dyskinesia, dystonia, or tics  Thought Process:  linear, primarily logical  Associations: No RUSSELL noted  Thought Content:  Denies suicidal and homicidal ideation as well as hallucinations and delusions, but this is hard to assess secondary to isolative behaviors and noted appearance of being preoccupied and paranoid at times  Insight:  " limited  Judgment:  limited  Oriented to:  time, person, and place  Attention Span and Concentration:  fair  Recent and Remote Memory:  intact  Fund of Knowledge: appropriate for education  Muscle Strength and Tone: normal  Gait and Station: Normal           Labs:     No labs today    BEHAVIORAL TEAM DISCUSSION    Progress: Limited. Compliant with medications. Denying psychosis but still isolative. No interest in adjusting or changing medications. He did attend one group this morning. Needs frequent reminders that this is a condition of his ongoing stay and placement in CD treatment  Continued Stay Criteria/Rationale: adjusting medications, placement/CD treatment TBD pending Rule 25 recommendations    Medical/Physical: none acute  Precautions:   Falls precaution?: No  Behavioral Orders   Procedures     Code 1 - Restrict to Unit     Routine Programming     As clinically indicated     Status 15     Every 15 minutes.     Plan:     Continue Zyprexa 30 mg at bedtime- not interested in adjusting dose or trialing anything new  Rule 25 completed  Continue PRN Seroquel 100 mg TID PRN for anxiety/agitation  Per the treatment team:   He needs to be attending groups in order to continue admission and transfer to CD treatment. He will require frequent reminders.        Participants: FERDINAND Tse, CNP, Nursing, SW, OT, Dr. Ad Morales

## 2021-08-31 NOTE — PLAN OF CARE
"Problem: Behavioral Health Plan of Care  Goal: Patient-Specific Goal (Individualization)  Description: Pt will sleep at least 6-8 hours per NOC shift.  Pt will eat at least 50% of meals.  Pt will be medication compliant.  Pt will follow treatment team recommendations,  Pt will attend at least 50% of groups.  Pt will get his own meals trays.  Pt can use phone in lounge during group times due to paranoia.    Outcome: No Change    Pt remained in his room for entire shift, only out to retrieve meal and return tray to cart in lounge; pt was noted to be proud of himself for getting out.  Did casually discuss group, inquiring \"how long\" it is. Pt was encouraged to try short visit to group, aware that it was only one or two peers at the time, but opted to try another time. Reading t/o the evening in his bed, denied SI, HI and hallucinations. Pt stated anxiety was \"not bad,\" adding that it and depression are both worse in the mornings. Pt denied any physical complaint and took HS medication as scheduled. No PRN medication on shift.    Problem: Thought Process Alteration  Goal: Optimal Thought Clarity  Description: Pt will report no paranoid statements by discharge.  Pt will be able to maintain a linear and reality based conversation by discharge  Outcome: Improving    Problem: Depression  Goal: Improved Mood  Description: Pt will report manageable levels of depression and anxiety by discharge.  Pt will contract for safety while on the unit.  Pt will report worsening of symptoms and any suicidal ideation.  Outcome: Improving    2330 - Face to face end of shift report to be communicated to oncoming shift RN.     Ashley Strickland RN  8/30/2021  10:55 PM                 "

## 2021-08-31 NOTE — PLAN OF CARE
Face to face received from Ema ROSALES RN.   Rounding completed, pt observed in room at this time.     Pt out to grab breakfast tray quick this morning, and then returns to room.   Pt received 100 mg Seroquel this morning upon request of 3 out 10 anxiety at 08:47.  Pt states mild depression this morning.   Pt denies SI and/or HI this morning.   Pt denies AH and/VH.   Pt appears anxious at this time in room pacing.   Pt encouraged to get out of room this morning, and to attend groups.   Pt does attend group session this morning after being  pursued to go.       Pt out in lounge for lunch this afternoon for short period of time.   No further patient concerns and/or complaints.    Problem: Thought Process Alteration  Goal: Optimal Thought Clarity  Description: Pt will report no paranoid statements by discharge.  Pt will be able to maintain a linear and reality based conversation by discharge    Outcome: No Change     Problem: Behavioral Health Plan of Care  Goal: Patient-Specific Goal (Individualization)  Description: Pt will sleep at least 6-8 hours per NOC shift.  Pt will eat at least 50% of meals.  Pt will be medication compliant.  Pt will follow treatment team recommendations,  Pt will attend at least 50% of groups.  Pt will get his own meals trays.  Pt can use phone in lounge during group times due to paranoia.    Outcome: Improving     Problem: Depression  Goal: Improved Mood  Description: Pt will report manageable levels of depression and anxiety by discharge.  Pt will contract for safety while on the unit.  Pt will report worsening of symptoms and any suicidal ideation.  Outcome: Improving     Face to face end of shift report to be communicated to oncoming RN.     Sue Dutta RN  8/31/2021  10:01 AM

## 2021-08-31 NOTE — PLAN OF CARE
Face to face shift report received from Ashley LUNA RN. Rounding completed, pt observed in room appears to be sleeping, in no apparent distress.  Respirations are even and non labored.  15 min/prn safety checks continue.       Problem: Behavioral Health Plan of Care  Goal: Patient-Specific Goal (Individualization)  Description: Pt will sleep at least 6-8 hours per NOC shift.  Pt will eat at least 50% of meals.  Pt will be medication compliant.  Pt will follow treatment team recommendations,  Pt will attend at least 50% of groups.  Pt will get his own meals trays.  Pt can use phone in lounge during group times due to paranoia.    Outcome: Improving        Pt appeared to sleep approx. 7 hours during the night.  Offered no complaints.    Face to face report will be communicated to oncoming RN.    Ema Dwyer RN  8/31/2021  6:08 AM

## 2021-08-31 NOTE — PLAN OF CARE
Spoke to pt this morning, they were found sitting on their bed. Let pt know that today would be the day that they need to go to groups and be seen in the lounge so that we can see if they are eligible for an IRTS or treatment. Let pt know if we don't see a decent improvement on their isolation that they would have no other choice than to discharge to a homeless shelter. :Pt agree's and has no further questions.

## 2021-09-01 PROCEDURE — 250N000013 HC RX MED GY IP 250 OP 250 PS 637: Performed by: NURSE PRACTITIONER

## 2021-09-01 PROCEDURE — 124N000001 HC R&B MH

## 2021-09-01 PROCEDURE — 99232 SBSQ HOSP IP/OBS MODERATE 35: CPT | Performed by: NURSE PRACTITIONER

## 2021-09-01 RX ORDER — NICOTINE 21 MG/24HR
1 PATCH, TRANSDERMAL 24 HOURS TRANSDERMAL DAILY
Status: DISCONTINUED | OUTPATIENT
Start: 2021-09-01 | End: 2021-09-03 | Stop reason: HOSPADM

## 2021-09-01 RX ADMIN — Medication 50 MG: at 11:22

## 2021-09-01 RX ADMIN — OLANZAPINE 30 MG: 10 TABLET, FILM COATED ORAL at 20:11

## 2021-09-01 RX ADMIN — QUETIAPINE FUMARATE 100 MG: 100 TABLET ORAL at 08:39

## 2021-09-01 RX ADMIN — NICOTINE 1 PATCH: 21 PATCH, EXTENDED RELEASE TRANSDERMAL at 11:21

## 2021-09-01 ASSESSMENT — ACTIVITIES OF DAILY LIVING (ADL)
LAUNDRY: UNABLE TO COMPLETE
ORAL_HYGIENE: INDEPENDENT
DRESS: SCRUBS (BEHAVIORAL HEALTH);INDEPENDENT
HYGIENE/GROOMING: INDEPENDENT
HYGIENE/GROOMING: INDEPENDENT
ORAL_HYGIENE: INDEPENDENT
DRESS: SCRUBS (BEHAVIORAL HEALTH);INDEPENDENT

## 2021-09-01 NOTE — PROGRESS NOTES
"[facility]  Psychiatric Progress Note      Impression:   This is a 46 year old yo male with a history of schizoaffective disorder- bipolar type and alcohol abuse. Mother brought him to the ED due to increase in paranoia and hallucinations. Reported that he had been hiding in their basement in the dark for past 2 weeks, believes that there are people after him. Patient reports that he has been compliant with his Zyprexa, though mother indicates that he has not been taking it. It does appear that he has remained relatively stable on Zyprexa for at least the last 10 years, so it is likely that he was not as compliant as he reports. He did recently move to his mother's home near [place], [place] in the past 2 months from [place] where he had been homeless. Current psych provider is still in [place] and per documentation it appears that he currently has a prescription for Zyprexa waiting for him to  at [facility]. Will ensure that he is able to obtain his prescriptions at discharge, whether they be mailed or transferred to pharmacy closer to his current residence.        Interim History:     Again in his room, this time looking out the window. He reported that he did go to groups yesterday but seemed agitated by this. When asked about it, he stated, \"I can handle about an hour in group, that's it.\" We did discuss that group attendance will be a requirement of CD treatment, so that he needs to continue going. He was fairly dismissive. When asked about symptoms, especially voices, he stated, \"ya, they are what they are.\" He stated that he was unsure if Seroquel helps or not, even though he had previously told his nurse this. \"I guess, but they are basically what they are no matter what.\" Andre reported that his symptoms are manageable and denied needing any changes outside of a \"stronger\" nicotine patch. Again encouraged to attend groups and get out of his room.          Diagnoses:   Schizoaffective disorder- bipolar " "type (by history)  Alcohol use disorder, moderate        Attestation:  Patient has been seen and evaluated by me,  Clifton Rangel NP      The patient's care was discussed with the treatment team and chart notes were reviewed.         Medications:     Current Facility-Administered Medications Ordered in Epic   Medication Dose Route Frequency Last Rate Last Admin     acetaminophen (TYLENOL) tablet 650 mg  650 mg Oral Q4H PRN         alum & mag hydroxide-simethicone (MAALOX) suspension 30 mL  30 mL Oral Q4H PRN         hydrOXYzine (ATARAX) tablet 25-50 mg  25-50 mg Oral Q4H PRN         melatonin tablet 3 mg  3 mg Oral At Bedtime PRN         nicotine (NICORETTE) gum 2 mg  2 mg Buccal Q1H PRN   2 mg at 08/20/21 0845     OLANZapine (zyPREXA) tablet 30 mg  30 mg Oral QPM   30 mg at 08/19/21 2003     QUEtiapine (SEROquel) tablet  mg   mg Oral BID PRN         senna-docusate (SENOKOT-S/PERICOLACE) 8.6-50 MG per tablet 1 tablet  1 tablet Oral BID PRN         No current [facility]-ordered outpatient medications on file.        10 point ROS- denies acute concerns       Allergies:   No Known Allergies         Psychiatric Examination:   /81   Pulse 75   Temp 98.9  F (37.2  C) (Tympanic)   Resp 16   Ht 1.778 m (5' 10\")   Wt 68.9 kg (151 lb 12.8 oz)   SpO2 98%   BMI 21.78 kg/m    Weight is 151 lbs 12.8 oz  Body mass index is 21.78 kg/m .    Appearance: awake, alert, looking out window  Attitude: cooperative but still seemed guarded  Eye Contact: fair  Mood: \"eh, fine\" seemed slightly agitated  Affect: congruent with irritability  Speech:  clear, coherent  Psychomotor Behavior:  no evidence of tardive dyskinesia, dystonia, or tics  Thought Process:  linear, no illogical or delusional statements made, avoidant of goal oriented conversation  Associations: No RUSSELL noted  Thought Content:  Denies suicidal and homicidal ideation; did admit to voices but would not discuss further. Appeared somewhat preoccupied but less " "so than yesterday.  Insight:  limited  Judgment:  limited  Oriented to:  time, person, and place  Attention Span and Concentration:  fair  Recent and Remote Memory:  intact  Fund of Knowledge: appropriate for education  Muscle Strength and Tone: normal  Gait and Station: Normal           Labs:     No labs today    BEHAVIORAL TEAM DISCUSSION    Progress: Did go to some groups yesterday, one in the morning and another from 1515 to 1600, and comes out of room for meals.  Compliant with medications. Admitting to some voices described as \"background noise.\" No interest in adjusting or changing medications.  Needs frequent reminders that group attendance is a condition of his ongoing stay and placement in CD treatment  Continued Stay Criteria/Rationale: adjusting medications, placement/CD treatment TBD pending Rule 25 recommendations    Medical/Physical: none acute  Precautions:   Falls precaution?: No  Behavioral Orders   Procedures     Code 1 - Restrict to Unit     Routine Programming     As clinically indicated     Status 15     Every 15 minutes.     Plan:     Continue Zyprexa 30 mg at bedtime  Rule 25 completed  Continue PRN Seroquel 100 mg TID PRN for anxiety/agitation  Per the treatment team:   He needs to be attending groups in order to continue admission and transfer to CD treatment. He will require frequent reminders.        Participants: Clifton Rangel, APRN, CNP, Nursing, SW, OT, Dr. Ad Morales  "

## 2021-09-01 NOTE — PLAN OF CARE
Face to face shift report received from Day KENT RN. Rounding completed, pt observed in room appears to be sleeping. Respirations are even and non labored.  15 min/prn safety checks continue.        Problem: Behavioral Health Plan of Care  Goal: Patient-Specific Goal (Individualization)  Description: Pt will sleep at least 6-8 hours per NOC shift.  Pt will eat at least 50% of meals.  Pt will be medication compliant.  Pt will follow treatment team recommendations,  Pt will attend at least 50% of groups.  Pt will get his own meals trays.  Pt can use phone in lounge during group times due to paranoia.    Outcome: Improving   Pt slept approx. 7 hours during the night.  Offered no complaints.      Face to face report will be communicated to oncoming RN.    Ema Dwyer RN  9/1/2021  5:41 AM

## 2021-09-01 NOTE — PLAN OF CARE
"Problem: Behavioral Health Plan of Care  Goal: Patient-Specific Goal (Individualization)  Description: Pt will sleep at least 6-8 hours per NOC shift.  Pt will eat at least 50% of meals.  Pt will be medication compliant.  Pt will follow treatment team recommendations,  Pt will attend at least 50% of groups.  Pt will get his own meals trays.  Pt can use phone in lounge during group times due to paranoia.    Outcome: Improving  Note: Pt had a blunted, flat affect. He was observed in group from 1515 to 1600. He ate supper in the lounge with peers and had an intake of 100%. He is still somewhat guarded with questioning. When asked if he was having any paranoia, he stated \"not really\". He described anxiety as \"not bad\". He denied depression, hallucinations, delusions, homicidal and suicidal ideation. He was compliant with his scheduled Zyprexa.    Face to face end of shift report communicated to oncoming RN.     Problem: Thought Process Alteration  Goal: Optimal Thought Clarity  Description: Pt will report no paranoid statements by discharge.  Pt will be able to maintain a linear and reality based conversation by discharge  Outcome: No Change  Note: Pt guarded. Some paranoia still present.      Problem: Depression  Goal: Improved Mood  Description: Pt will report manageable levels of depression and anxiety by discharge.  Pt will contract for safety while on the unit.  Pt will report worsening of symptoms and any suicidal ideation.  Outcome: Improving  Note: Pt denied depression and suicidal ideation. He remained free from self harm.      "

## 2021-09-02 PROCEDURE — 250N000013 HC RX MED GY IP 250 OP 250 PS 637: Performed by: NURSE PRACTITIONER

## 2021-09-02 PROCEDURE — 124N000001 HC R&B MH

## 2021-09-02 PROCEDURE — 99232 SBSQ HOSP IP/OBS MODERATE 35: CPT | Performed by: NURSE PRACTITIONER

## 2021-09-02 RX ADMIN — NICOTINE 1 PATCH: 21 PATCH, EXTENDED RELEASE TRANSDERMAL at 08:27

## 2021-09-02 RX ADMIN — Medication 50 MG: at 08:26

## 2021-09-02 RX ADMIN — OLANZAPINE 30 MG: 10 TABLET, FILM COATED ORAL at 20:00

## 2021-09-02 ASSESSMENT — ACTIVITIES OF DAILY LIVING (ADL)
HYGIENE/GROOMING: INDEPENDENT
LAUNDRY: UNABLE TO COMPLETE
ORAL_HYGIENE: INDEPENDENT
DRESS: INDEPENDENT;SCRUBS (BEHAVIORAL HEALTH)

## 2021-09-02 NOTE — PLAN OF CARE
"Spoke to pt this afternoon, pt was found sitting in their room reading. Let pt know he is doing good with going to groups and encouraged them to keep it up this afternoon because the next day will determine if we will continue with treatment plans or discharge to homeless shelter. \"Well I've been doing really good and I'm going to call my mom\". Reminded pt that mom will not allow them to come home without treatment. Pt went on to ask this writer when they worked next and that we would \"figure it out then\". Before leaving pt's room, reminded again that they will need to attend groups the rest of the day and tomorrow.   "

## 2021-09-02 NOTE — PROGRESS NOTES
"[facility]  Psychiatric Progress Note      Impression:   This is a 46 year old yo male with a history of schizoaffective disorder- bipolar type and alcohol abuse. Mother brought him to the ED due to increase in paranoia and hallucinations. Reported that he had been hiding in their basement in the dark for past 2 weeks, believes that there are people after him. Patient reports that he has been compliant with his Zyprexa, though mother indicates that he has not been taking it. It does appear that he has remained relatively stable on Zyprexa for at least the last 10 years, so it is likely that he was not as compliant as he reports. He did recently move to his mother's home near [place], [place] in the past 2 months from [place] where he had been homeless. Current psych provider is still in [place] and per documentation it appears that he currently has a prescription for Zyprexa waiting for him to  at [facility]. Will ensure that he is able to obtain his prescriptions at discharge, whether they be mailed or transferred to pharmacy closer to his current residence.        Interim History:     Up in group this morning. We talked after about the need to attend all groups today secondary to the expectation that he will attend at least 30 hours of groups per week while in MICD programming. He was fairly resistant to this in conversation, stating that this would be \"very hard,\" for him. When asked if there was something we could do, change or a medication adjustment that would help make it easier, he declined, and added, \"I don't need that Seroquel either. It's too much. The less meds the better, you know?\" He reported that he had no benefits from the Seroquel, despite demonstrating presentation indicating improvement in anxiety following administration. He also stated, \"I don't know about this treatment in the [facility].\" When asked what he was thinking or if there was something else he was looking at, he relied, \"well, " "no. I'm going to try to do the groups today.\"    Has been coming out of room for meals. Still fairly guarded, denying depression, SI/HI and denied hallucinations today. Reported that anxiety is \"fine.\" Sleep has been adequate.          Diagnoses:   Schizoaffective disorder- bipolar type (by history)  Alcohol use disorder, moderate        Attestation:  Patient has been seen and evaluated by me,  Clifton Rangel NP      The patient's care was discussed with the treatment team and chart notes were reviewed.         Medications:     Current Facility-Administered Medications Ordered in Epic   Medication Dose Route Frequency Last Rate Last Admin     acetaminophen (TYLENOL) tablet 650 mg  650 mg Oral Q4H PRN         alum & mag hydroxide-simethicone (MAALOX) suspension 30 mL  30 mL Oral Q4H PRN         hydrOXYzine (ATARAX) tablet 25-50 mg  25-50 mg Oral Q4H PRN         melatonin tablet 3 mg  3 mg Oral At Bedtime PRN         nicotine (NICORETTE) gum 2 mg  2 mg Buccal Q1H PRN   2 mg at 08/20/21 0845     OLANZapine (zyPREXA) tablet 30 mg  30 mg Oral QPM   30 mg at 08/19/21 2003     QUEtiapine (SEROquel) tablet  mg   mg Oral BID PRN         senna-docusate (SENOKOT-S/PERICOLACE) 8.6-50 MG per tablet 1 tablet  1 tablet Oral BID PRN         No current [facility]-ordered outpatient medications on file.        10 point ROS- denies acute concerns       Allergies:   No Known Allergies         Psychiatric Examination:   /80 (BP Location: Right arm)   Pulse 88   Temp 99.3  F (37.4  C) (Temporal)   Resp 18   Ht 1.778 m (5' 10\")   Wt 68.9 kg (151 lb 12.8 oz)   SpO2 98%   BMI 21.78 kg/m    Weight is 151 lbs 12.8 oz  Body mass index is 21.78 kg/m .    Appearance: awake, alert  Attitude: cooperative, pleasant but still guarded  Eye Contact: fair  Mood: Reportedly better but \"stressed,\" not as agitated  Affect: a little more responsive, smiled some  Speech:  clear, coherent  Psychomotor Behavior:  no evidence of tardive " dyskinesia, dystonia, or tics  Thought Process:  Logical, remained linear, a little more goal directed   Associations: No RUSSELL noted  Thought Content:  Denies suicidal and homicidal ideation; denied voices today, not as preoccupied  Insight:  limited  Judgment:  limited  Oriented to:  time, person, and place  Attention Span and Concentration:  fair  Recent and Remote Memory:  intact  Fund of Knowledge: appropriate for education  Muscle Strength and Tone: normal  Gait and Station: Normal           Labs:     No labs today    BEHAVIORAL TEAM DISCUSSION    Progress: Still resistant to groups and needs reminders, but did say he was going to try harder to get to groups today. Compliant with medications but not agreeable to changes. Still guarded.    Continued Stay Criteria/Rationale: adjusting medications, placement/CD treatment - most likely MICD  Medical/Physical: none acute  Precautions:   Falls precaution?: No  Behavioral Orders   Procedures     Code 1 - Restrict to Unit     Routine Programming     As clinically indicated     Status 15     Every 15 minutes.     Plan:     Continue Zyprexa 30 mg at bedtime  Rule 25 completed  Continue PRN Seroquel 100 mg TID PRN for anxiety/agitation  Per the treatment team:   He needs to be attending all groups in order to continue admission and transfer to CD treatment. He will require frequent reminders.        Participants: Clifton Rangel, APRN, CNP, Nursing, SW, OT, Dr. Ad Morales

## 2021-09-02 NOTE — PLAN OF CARE
"Face to face end of shift report received from Jennifer SCHAEFFER RN.  Pt observed in his room reading his book.        Problem: Behavioral Health Plan of Care  Goal: Patient-Specific Goal (Individualization)  Description: Pt will sleep at least 6-8 hours per NOC shift.  Pt will eat at least 50% of meals.  Pt will be medication compliant.  Pt will follow treatment team recommendations,  Pt will attend at least 50% of groups.  Pt will get his own meals trays.  Pt can use phone in lounge during group times due to paranoia.    Outcome: Improving  Note:      Pt is pleasant and cooperative with writer.  States he admits to ongoing anxiety.  States he is overall ok.  Writer did encourage pt to attend groups this shift. Pt refused to attend groups all evening shift although strongly encouraged too.  Pt stated \" I attended three groups this morning that is good enough.\"  Pt was cooperative with all scheduled medications.  Pt makes needs known.     Problem: Thought Process Alteration  Goal: Optimal Thought Clarity  Description: Pt will report no paranoid statements by discharge.  Pt will be able to maintain a linear and reality based conversation by discharge    Outcome: Improving     Problem: Depression  Goal: Improved Mood  Description: Pt will report manageable levels of depression and anxiety by discharge.  Pt will contract for safety while on the unit.  Pt will report worsening of symptoms and any suicidal ideation.  Outcome: Improving     "

## 2021-09-02 NOTE — PLAN OF CARE
"Problem: Behavioral Health Plan of Care  Goal: Patient-Specific Goal (Individualization)  Description: Pt will sleep at least 6-8 hours per NOC shift.  Pt will eat at least 50% of meals.  Pt will be medication compliant.  Pt will follow treatment team recommendations,  Pt will attend at least 50% of groups.  Pt will get his own meals trays.  [facility] during group times due to paranoia.    Outcome: No change  Note: Pt had a blunted affect. He came out to the lounge to eat his supper and had an intake of 100%. He has otherwise been isolative and withdrawn to his room. Encouraged group programming; however, pt stated \"I'm a quiet person. I've come a long way the past few days. I don't like being forced.\" Pt was guarded and dismissive. When pt was asked if he was having any hallucinations or paranoia, he stated \"not really.\" He would then change the topic. Described anxiety as \"not bad.\" He denied depression and suicidal ideation. When staff attempted to discuss his reason for admission, pt stated \"oh just anxiety and stress. I'm almost 49 years old and living with my mom.\" He was compliant with his scheduled Zyprexa.     Face to face end of shift report communicated to oncoming RN.      Problem: Thought Process Alteration  Goal: Optimal Thought Clarity  Description: Pt will report no paranoid statements by discharge.  Pt will be able to maintain a linear and reality based conversation by discharge  Outcome: No Change  Note: Pt was guarded. He presented as paranoid. He offered minimal conversation.      Problem: Depression  Goal: Improved Mood  Description: Pt will report manageable levels of depression and anxiety by discharge.  Pt will contract for safety while on the unit.  Pt will report worsening of symptoms and any suicidal ideation.  Outcome: Improving  Note: Pt denied depression and suicidal ideation. He remained free from self harm.      "

## 2021-09-02 NOTE — PLAN OF CARE
Face to face report received from Day KENT RN. Pt. Observed.     Problem: Behavioral Health Plan of Care  Goal: Patient-Specific Goal (Individualization)  Description: Pt will sleep at least 6-8 hours per NOC shift.  Pt will eat at least 50% of meals.  Pt will be medication compliant.  Pt will follow treatment team recommendations,  Pt will attend at least 50% of groups.  Pt will get his own meals trays.  Pt can use phone in lounge during group times due to paranoia.    Outcome: No Change  Note: Pt has been in bed with eyes closed and regular respirations x 7 hours this noc shift. 15 minute and PRN checks all night. No complaints offered. Will continue to monitor.         Face to face end of shift report to be communicated to oncoming RN.     Marie Teresa RN  9/2/2021

## 2021-09-02 NOTE — PLAN OF CARE
"Face to face end of shift report communicated to oncoming shift.     Jennifer Silva RN  9/2/2021  3:08 PM    Problem: Behavioral Health Plan of Care  Goal: Patient-Specific Goal (Individualization)  Description: Pt will sleep at least 6-8 hours per NOC shift.  Pt will eat at least 50% of meals.  Pt will be medication compliant.  Pt will follow treatment team recommendations,  Pt will attend at least 50% of groups.  Pt will get his own meals trays.  Pt can use phone in lounge during group times due to paranoia.    Outcome: No Change  Note: Shift Summery:      Patient in room at the start of this writer's shift.  Patient comes to nurses station requesting his Nicotine patch.  Nicotine patch given to patient in his room, talked with patient regarding attending groups.  Patient reports \"it's just so hard\"  Continued to encourage patient to attend.  Patient attends 3 groups this shift.      Patient calm and cooperative with nursing assessment and cares.  Patient reports \"I'm ok\".   Flat affect noted.     Patient does not attend last group of this shift, patient was encouraged.     Face to face start of shift report received from RN.  Patient in room at this time, will continue to monitor.        "

## 2021-09-03 VITALS
HEART RATE: 105 BPM | WEIGHT: 151.8 LBS | TEMPERATURE: 98 F | HEIGHT: 70 IN | BODY MASS INDEX: 21.73 KG/M2 | RESPIRATION RATE: 16 BRPM | DIASTOLIC BLOOD PRESSURE: 87 MMHG | SYSTOLIC BLOOD PRESSURE: 130 MMHG | OXYGEN SATURATION: 97 %

## 2021-09-03 PROCEDURE — 250N000013 HC RX MED GY IP 250 OP 250 PS 637: Performed by: NURSE PRACTITIONER

## 2021-09-03 PROCEDURE — 99239 HOSP IP/OBS DSCHRG MGMT >30: CPT | Performed by: NURSE PRACTITIONER

## 2021-09-03 RX ORDER — OLANZAPINE 15 MG/1
30 TABLET ORAL EVERY EVENING
Qty: 60 TABLET | Refills: 0 | Status: ON HOLD | OUTPATIENT
Start: 2021-09-03 | End: 2023-06-06

## 2021-09-03 RX ORDER — QUETIAPINE FUMARATE 100 MG/1
100 TABLET, FILM COATED ORAL 3 TIMES DAILY PRN
Qty: 30 TABLET | Refills: 0 | Status: ON HOLD | OUTPATIENT
Start: 2021-09-03 | End: 2023-06-06

## 2021-09-03 RX ADMIN — Medication 50 MG: at 08:14

## 2021-09-03 RX ADMIN — NICOTINE 1 PATCH: 21 PATCH, EXTENDED RELEASE TRANSDERMAL at 08:14

## 2021-09-03 NOTE — PLAN OF CARE
Face to face report received from Lucila HERNANDEZ. Pt. Observed.     Problem: Behavioral Health Plan of Care  Goal: Patient-Specific Goal (Individualization)  Description: Pt will sleep at least 6-8 hours per NOC shift.  Pt will eat at least 50% of meals.  Pt will be medication compliant.  Pt will follow treatment team recommendations,  Pt will attend at least 100% of groups.  Pt will get his own meals trays.  Pt can use phone in lounge during group times due to paranoia.    Outcome: No Change  Note: Pt has been in bed with eyes closed and regular respirations x 7 hours this noc shift. 15 minute and PRN checks all night. No complaints offered. Will continue to monitor.       Face to face end of shift report to be communicated to oncoming RN.     Marie Teresa RN  9/3/2021

## 2021-09-03 NOTE — PLAN OF CARE
Pt is discharging at the recommendation of the treatment team. Pt is discharging to home transported by mom. Pt denies having any thoughts of hurting themself or anyone else. Pt denies anxiety or depression. Pt has follow up with [facility] rule 25. Discharge instructions, including; demographic sheet, psychiatric evaluation, discharge summary, and AVS were faxed to these next level of care providers.

## 2021-09-03 NOTE — PLAN OF CARE
"Spoke to pt this morning with their NP. Reported to pt that discharge to a homeless shelter is a team decision today due to the pt not attending all groups that was a requirement to be able to go to treatment. Pt requested one more day to \"prove himself\". Let pt know they have had numerous chances to attend these groups and show that they want to go to treatment and that the team has made a final decision. Pt states they can't go to [facility] and that it's \"no good\" and \"bad news\". Told pt this is where their coping skills and knowledge from the groups would kick in and help them make the right decisions on the outs. Pt requested to speak to their mom first.    Pt's mom called this writer and demanding to know why we were going to be discharging the pt. Let her know that this is a acute care unit and that the pt was not meeting criteria to be here and that it's not a holding place. Mom was confused on why we were not giving the pt individual therapy to help with their paranoia and other mental illness. Let her know again that we are acute care, only offer groups and that the pt is refusing to change their meds. She was also concerned of why we were not setting him up in a place that he can live in and get individual help because of his anxiety and paranoia. Informed her that nothing like that exists and that for what we were hoping to get the pt in, they did not meet criteria due to lack of going to groups and the pt thinking that 3 hours of group a day was \"good enough\" when these programs require over 30 hours of groups and if not attended they would be kicked out and with him not attending all groups here, he would not be accepted. Mom understood this and requested to think about med changes. Informed her that even if the provider agrees and the pt agrees to changing meds that they would have to be compliant everyday on taking them and not going back and forth like they have been and the day they say they don't want " "the meds we would be discharging him to a homeless shelter. Pt's mom demanded to talk with the pt's NP.     Spoke to pt again in the hallway per request. Pt states \"I'm gona see how the groups go and we will see\" Let pt know the plan today is discharging to a homeless shelter unless the provider has a different plan. \"Well what about a hotel?\" Pt states they have money and could pay for a hotel. Let them know this could be an option. \"Well I'll go to these groups and see\". Pt is reminded again to anticipate discharge today.   "

## 2021-09-03 NOTE — DISCHARGE INSTRUCTIONS
Behavioral Discharge Planning and Instructions    Summary: Pt presented to the ED by Self and Family/Friends related to concerns for increasing paranoia, alcohol use, isolation, and concerns about visual hallucinations.    Main Diagnosis: Schizoaffective disorder- bipolar type (by history)  Alcohol use disorder, moderate    Health Care Follow-up:     Rule 25 completed by DELORIS Chavez  [facility]   Behavioral Health Unit  [address] [place] [address]  [id number]  [phone]  Fax - [phone]      [facility] Internal Medicine  Flori Zhou  [address], 3rd floor of the first building  Phone:  [phone]  Fax: [phone]      Attend all scheduled appointments with your outpatient providers. Call at least 24 hours in advance if you need to reschedule an appointment to ensure continued access to your outpatient providers.     Major Treatments, Procedures and Findings:  You were provided with: a psychiatric assessment, assessed for medical stability, medication evaluation and/or management, group therapy, family therapy, individual therapy, CD evaluation/assessment, milieu management and medical interventions    Symptoms to Report: feeling more aggressive, increased confusion, losing more sleep, mood getting worse or thoughts of suicide    Early warning signs can include: increased depression or anxiety sleep disturbances increased thoughts or behaviors of suicide or self-harm  increased unusual thinking, such as paranoia or hearing voices    Safety and Wellness:  Take all medicines as directed.  Make no changes unless your doctor suggests them.      Follow treatment recommendations.  Refrain from alcohol and non-prescribed drugs.  Ask your support system to help you reduce your access to items that could harm yourself or others. Items could include:  Firearms  Medicines (both prescribed and over-the-counter)  Knives and other sharp objects  Ropes and like materials  Car  keys  If there is a concern for safety, call 911. If there is a concern for safety, call 911.    Resources:   Crisis Intervention: 123.886.1201 or 484-961-9548 (TTY: 731.936.8933).  Call anytime for help.  National [place] on Mental Illness (www.[place].blaze.org): 325.537.7882 or 708-363-9566.  [place] Association for Children's Mental Health (www.mac.org): 967.368.7510.  Alcoholics Anonymous (www.alcoholics-anonymous.org): Check your phone book for your local chapter.  Suicide Awareness Voices of Education (SAVE) (www.save.org): 930-482-IEGM (8800)  National Suicide Prevention Line (www.mentalhealthmn.org): 096-546-PWCZ (4843)  Mental Health Consumer/Survivor Network of [place] (www.mhcsn.net): 821.445.7923 or 097-405-1439  Mental Health Association of [place] (www.mentalhealth.org): 221.714.5509 or 722-858-3558  Self- Management and Recovery Training., SMART-- Toll free: 242.320.5745  www.[employer].org    General Medication Instructions:   See your medication sheet(s) for instructions.   Take all medicines as directed.  Make no changes unless your doctor suggests them.   Go to all your doctor visits.  Be sure to have all your required lab tests. This way, your medicines can be refilled on time.  Do not use any drugs not prescribed by your doctor.  Avoid alcohol.    Advance Directives:   Scanned document on file with [place]? No scanned doc  Is document scanned? Pt states no documents  Honoring Choices Your Rights Handout: Informed and given  Was more information offered? Pt declined    The Treatment team has appreciated the opportunity to work with you. If you have any questions or concerns about your recent admission, you can contact the unit which can receive your call 24 hours a day, 7 days a week. They will be able to get in touch with a Provider if needed. The unit number is [phone] .

## 2021-09-03 NOTE — PLAN OF CARE
"  Problem: Behavioral Health Plan of Care  Goal: Plan of Care Review  Outcome: Adequate for Discharge     Problem: Behavioral Health Plan of Care  Goal: Patient-Specific Goal (Individualization)  Description: Pt will sleep at least 6-8 hours per NOC shift.  Pt will eat at least 50% of meals.  Pt will be medication compliant.  Pt will follow treatment team recommendations,  Pt will attend at least 100% of groups.  Pt will get his own meals trays.  Pt can use phone in lounge during group times due to paranoia.    Outcome: Adequate for Discharge  Note: 15:35: Received end of shift report from DAVID Rodriguez. Pt making phone call upon arrival, blunted affect, mood is somewhat anxious, paranoid activity.     Discharge Note    Patient Discharged to mother's home on 9/3/2021 16:44 PM via Private Car accompanied by RED.     Patient and family informed of discharge instructions in AVS. patient verbalizes understanding and denies having any questions pertaining to AVS. Patient stable at time of discharge. Patient denies SI, HI, and thoughts of self harm at time of discharge. All personal belongings returned to patient. Discharge prescriptions sent to [facility] via electronic communication. Mother aware of Rx needing to be picked up; Pt stating \"I don't need my medications, I have them at home.\" \"I go via [facility] in [place].\"      Saranya Abdi RN  9/3/2021  5:16 PM           Problem: Behavioral Health Plan of Care  Goal: Adheres to Safety Considerations for Self and Others  Outcome: Adequate for Discharge     Problem: Behavioral Health Plan of Care  Goal: Absence of New-Onset Illness or Injury  Outcome: Adequate for Discharge     Problem: Behavioral Health Plan of Care  Goal: Optimized Coping Skills in Response to Life Stressors  Outcome: Adequate for Discharge     Problem: Behavioral Health Plan of Care  Goal: Develops/Participates in Therapeutic [place] to Support Successful Transition  Outcome: " Adequate for Discharge     Problem: Thought Process Alteration  Goal: Optimal Thought Clarity  Description: Pt will report no paranoid statements by discharge.  Pt will be able to maintain a linear and reality based conversation by discharge    Outcome: Adequate for Discharge     Problem: Depression  Goal: Improved Mood  Description: Pt will report manageable levels of depression and anxiety by discharge.  Pt will contract for safety while on the unit.  Pt will report worsening of symptoms and any suicidal ideation.  Outcome: Adequate for Discharge

## 2021-09-03 NOTE — DISCHARGE SUMMARY
"     Psychiatric Discharge Summary    Andre Bustos MRN# [id number]   Age: 46 year old YOB: 1975     Date of Admission:  8/18/2021  Date of Discharge:  9/3/2021  Admitting Physician:  Ad Morales DO  Discharge Provider:  Clifton Rangel NP          Event Leading to Hospitalization and Hospital Stay   HPI   Andre Bustos is a 46 year old single male who was brought in to the [facility] ED by his family for evaluation of increased paranoia and visual hallucinations. Family reported that he has not been taking his medications, though patient states that he is. Moved in with mother and stepfather about 2 months ago after being homeless in [place]. Mother reported feeling that he is \"spiraling downward\" and needs help with mental health and chemical dependency. History of alcohol abuse, though minimal use in last 3 weeks per his report. He was agreeable to voluntary admission and was transferred to behavioral health for further evaluation.     Andre is quite dismissive today when I attempt to meet with him, states he did not sleep last night due to being in the ED. \"Can you ask those questions later, I'm tired\". We do review that he sees a psychiatric provider through [facility], most recently prescribed Zyprexa. He denies any hallucinations at this time and denies suicidal or homicidal ideation.      Collateral was obtained from his mother, who brought him to the ED. She reports that he has been paranoid, has been hiding in their basement in the dark and peeking out the windows. Reports that the \"natives\" are going to come and kill him. Mother reports that he has been having visual hallucinations for the last 3 weeks. She also reports that he has not been compliant with medication, though the patient insists that he has been taking them. She would like to see him get help for his MH and his alcohol use.      In review of available records it appears that he is currently prescribed Zyprexa 30 mg at " "bedtime. For the last several years it appears that he was also taking Temazepam at bedtime, though his provider discontinued this at some point when they found out he had been drinking. Unclear when this was stopped. In discussion today he does indicate that the Zyprexa works and wishes to continue on this. It does appear that his mental health has been relatively stable on Zyprexa for many years with last hospitalization being at least 10 years ago. It is likely that he was not taking the medication prior to admission, possibly due to issues related to his relocation from [place], where his services are based, to his mother's home near [place].           Stay:  Admitted to unit voluntarily. Provided a safe environment and therapeutic milieu. Encouraged participation in unit activities. Zyprexa 30mg at bed continued. Added PRN Seroquel for severe anxiety and paranoia. This did seem to help, though his reports have varied. Rule 25 completed with recommendations for MICD programming. He spent much of his time isolating in his room, despite repeated reminders that he needed to be attending groups and participating in programming, both to demonstrate his need to remain hospitalized and his ability to participate in CD treatment programming. One day he attended three groups after being told he would need to attend all groups or discharge would be arranged. He stated that three groups was \"enough.\" Offered to arrange discharge to [facility] or [facility] but he has been fairly resistant to this, stating he would prefer to discharge to a hotel. Mother contacted and she is concerned about this; however, will not allow him to return home. She was insistent that lauren be \"allowed to start over,\" and try new medications and groups again. Lauren has been unwilling to try new medications, other than the PRN Seroquel, and has notably improved on his current medications, which he has been on a number of years.         Treatment Team Progress " Note:   The patient's care was discussed with the treatment team and chart notes were reviewed.    Plan:  Patient is discharging at the recommendation of the treatment team.     At time of discharge, there is no evidence that patient is in immediate danger of self or others.        Diagnoses:     Schizoaffective disorder- bipolar type (by history)  Alcohol use disorder, moderate         Labs:     Results for orders placed or performed during the hospital encounter of 08/18/21   CBC with platelets differential     Status: None    Narrative    The following orders were created for panel order CBC with platelets differential.  Procedure                               Abnormality         Status                     ---------                               -----------         ------                     CBC with platelets and d...[[id number]]                      Final result                 Please view results for these tests on the individual orders.   Comprehensive metabolic panel     Status: Abnormal   Result Value Ref Range    Sodium 139 133 - 144 mmol/L    Potassium 4.0 3.4 - 5.3 mmol/L    Chloride 110 (H) 94 - 109 mmol/L    Carbon Dioxide (CO2) 25 20 - 32 mmol/L    Anion Gap 4 3 - 14 mmol/L    Urea Nitrogen 13 7 - 30 mg/dL    Creatinine 1.03 0.66 - 1.25 mg/dL    Calcium 9.4 8.5 - 10.1 mg/dL    Glucose 87 70 - 99 mg/dL    Alkaline Phosphatase 63 40 - 150 U/L    AST 8 0 - 45 U/L    ALT 18 0 - 70 U/L    Protein Total 7.7 6.8 - 8.8 g/dL    Albumin 4.2 3.4 - 5.0 g/dL    Bilirubin Total 0.5 0.2 - 1.3 mg/dL    GFR Estimate 86 >60 mL/min/1.73m2   Ethyl Alcohol Level     Status: Normal   Result Value Ref Range    Alcohol ethyl <0.01 <=0.01 g/dL   UA with Microscopic reflex to Culture     Status: Abnormal    Specimen: Urine, Midstream   Result Value Ref Range    Color Urine Yellow Colorless, Straw, Light Yellow, Yellow    Appearance Urine Clear Clear    Glucose Urine Negative Negative mg/dL    Bilirubin Urine Negative Negative     Ketones Urine 10  (A) Negative mg/dL    Specific Gravity Urine 1.028 1.003 - 1.035    Blood Urine Negative Negative    pH Urine 5.5 4.7 - 8.0    Protein Albumin Urine Negative Negative mg/dL    Urobilinogen Urine Normal Normal, 2.0 mg/dL    Nitrite Urine Negative Negative    Leukocyte Esterase Urine Negative Negative    Mucus Urine Present (A) None Seen /LPF    RBC Urine 0 <=2 /HPF    WBC Urine 1 <=5 /HPF    Narrative    Urine Culture not indicated   CBC with platelets and differential     Status: None   Result Value Ref Range    WBC Count 8.2 4.0 - 11.0 10e3/uL    RBC Count 4.78 4.40 - 5.90 10e6/uL    Hemoglobin 14.9 13.3 - 17.7 g/dL    Hematocrit 44.1 40.0 - 53.0 %    MCV 92 78 - 100 fL    MCH 31.2 26.5 - 33.0 pg    MCHC 33.8 31.5 - 36.5 g/dL    RDW 12.2 10.0 - 15.0 %    Platelet Count 290 150 - 450 10e3/uL    % Neutrophils 65 %    % Lymphocytes 30 %    % Monocytes 5 %    % Eosinophils 0 %    % Basophils 0 %    % Immature Granulocytes 0 %    NRBCs per 100 WBC 0 <1 /100    Absolute Neutrophils 5.3 1.6 - 8.3 10e3/uL    Absolute Lymphocytes 2.5 0.8 - 5.3 10e3/uL    Absolute Monocytes 0.4 0.0 - 1.3 10e3/uL    Absolute Eosinophils 0.0 0.0 - 0.7 10e3/uL    Absolute Basophils 0.0 0.0 - 0.2 10e3/uL    Absolute Immature Granulocytes 0.0 <=0.0 10e3/uL    Absolute NRBCs 0.0 10e3/uL   Urine Drugs of Abuse Screen Panel 13     Status: Abnormal   Result Value Ref Range    Cannabinoids (95-jch-7-carboxy-9-THC) Not Detected Not Detected, Indeterminate    Phencyclidine Not Detected Not Detected, Indeterminate    Cocaine (Benzoylecgonine) Not Detected Not Detected, Indeterminate    Methamphetamine (d-Methamphetamine) Not Detected Not Detected, Indeterminate    Opiates (Morphine) Not Detected Not Detected, Indeterminate    Amphetamine (d-Amphetamine) Not Detected Not Detected, Indeterminate    Benzodiazepines (Nordiazepam) Detected (A) Not Detected, Indeterminate    Tricyclic Antidepressants (Desipramine) Not Detected Not  Detected, Indeterminate    Methadone Not Detected Not Detected, Indeterminate    Barbiturates (Butalbital) Not Detected Not Detected, Indeterminate    Oxycodone Not Detected Not Detected, Indeterminate    Propoxyphene (Norpropoxyphene) Not Detected Not Detected, Indeterminate    Buprenorphine Not Detected Not Detected, Indeterminate   Asymptomatic COVID-19 Virus (Coronavirus) by PCR Nasopharyngeal     Status: Normal    Specimen: Nasopharyngeal; Swab   Result Value Ref Range    SARS CoV2 PCR Negative Negative    Narrative    Testing was performed using the Xpert Xpress SARS-CoV-2 Assay on the   Cepheid Gene-Xpert Instrument Systems. Additional information about   this Emergency Use Authorization (EUA) assay can be found via the Lab   Guide. This test should be ordered for the detection of SARS-CoV-2 in   individuals who meet SARS-CoV-2 clinical and/or epidemiological   criteria. Test performance is unknown in asymptomatic patients. This   test is for in vitro diagnostic use under the FDA EUA for   laboratories certified under CLIA to perform high complexity testing.   This test has not been FDA cleared or approved. A negative result   does not rule out the presence of PCR inhibitors in the specimen or   target RNA in concentration below the limit of detection for the   assay. The possibility of a false negative should be considered if   the patient's recent exposure or clinical presentation suggests   COVID-19. This test was validated by [facility]. This laboratory is certified under the Clinical Laboratory Improve  ment Amendments (CLIA) as qualified to perform high complexity testing.   Asymptomatic COVID-19 Virus (Coronavirus) by PCR Nasopharyngeal     Status: Normal    Specimen: Nasopharyngeal; Swab   Result Value Ref Range    SARS CoV2 PCR Negative Negative    Narrative    Testing was performed using the Xpert Xpress SARS-CoV-2 Assay on the   Cepheid Gene-Xpert Instrument Systems.  Additional information about   this Emergency Use Authorization (EUA) assay can be found via the Lab   Guide. This test should be ordered for the detection of SARS-CoV-2 in   individuals who meet SARS-CoV-2 clinical and/or epidemiological   criteria. Test performance is unknown in asymptomatic patients. This   test is for in vitro diagnostic use under the FDA EUA for   laboratories certified under CLIA to perform high complexity testing.   This test has not been FDA cleared or approved. A negative result   does not rule out the presence of PCR inhibitors in the specimen or   target RNA in concentration below the limit of detection for the   assay. The possibility of a false negative should be considered if   the patient's recent exposure or clinical presentation suggests   COVID-19. This test was validated by [facility]. This laboratory is certified under the Clinical Laboratory Improve  ment Amendments (CLIA) as qualified to perform high complexity testing.   Urine Drugs of Abuse Screen     Status: Abnormal    Narrative    The following orders were created for panel order Urine Drugs of Abuse Screen.  Procedure                               Abnormality         Status                     ---------                               -----------         ------                     Urine Drugs of Abuse Scr...[[id number]]  Abnormal            Final result                 Please view results for these tests on the individual orders.          Discharge Medications:     Current Discharge Medication List      START taking these medications    Details   QUEtiapine (SEROQUEL) 100 MG tablet Take 1 tablet (100 mg) by mouth 3 times daily as needed (agitation/paranoia)  Qty: 30 tablet, Refills: 0    Associated Diagnoses: Schizoaffective disorder, bipolar type (H)         CONTINUE these medications which have CHANGED    Details   OLANZapine (ZYPREXA) 15 MG tablet Take 2 tablets (30 mg) by mouth every  evening  Qty: 60 tablet, Refills: 0    Associated Diagnoses: Schizoaffective disorder, bipolar type (H)         CONTINUE these medications which have NOT CHANGED    Details   nicotine polacrilex (NICORETTE) 4 MG gum Take 4 mg by mouth as needed                Psychiatric Examination:   Appearance:  awake, alert and adequately groomed  Attitude:  cooperative  Eye Contact:  good  Mood:  better  Affect: a little more responsive, still often flat  Speech:  clear, coherent  Psychomotor Behavior:  no evidence of tardive dyskinesia, dystonia, or tics  Thought Process:  linear and goal oriented, primarily logical but difficult to reason with  Associations:  no loose associations  Thought Content:  no evidence of suicidal ideation or homicidal ideation, less guarded and appears less paranoid today  Insight:  limited  Judgment:  limited  Oriented to:  time, person, and place  Attention Span and Concentration:  fair  Recent and Remote Memory:  intact  Fund of Knowledge: appropriate  Muscle Strength and Tone: normal  Gait and Station: Normal         Discharge Plan:   Discharge to place of choosing, planning for a hotel right now. Medications ordered from [facility] to be sent with.     Health Care Follow-up:      [facility] Medicine  Flori Winnie- Wednesday September 1st @ 10:15   [address], 3rd floor of the [facility] building  Phone:  [phone]  Fax: [phone]    Attestation:  The patient has been seen and evaluated by me,  Clifton Rangel NP         Discharge Services Provided:   40 minutes spent on discharge services, including:  Final examination of patient.  Review and discussion of Hospital stay with patient and his mother  Instructions for continued outpatient care/goals.  Preparation of discharge records.  Preparation of medications refills and new prescriptions.  Preparation of Applicable referral forms.

## 2023-06-05 ENCOUNTER — TRANSFERRED RECORDS (OUTPATIENT)
Dept: HEALTH INFORMATION MANAGEMENT | Facility: CLINIC | Age: 48
End: 2023-06-05

## 2023-06-05 ENCOUNTER — TELEPHONE (OUTPATIENT)
Dept: BEHAVIORAL HEALTH | Facility: HOSPITAL | Age: 48
End: 2023-06-05

## 2023-06-05 ENCOUNTER — HOSPITAL ENCOUNTER (INPATIENT)
Facility: HOSPITAL | Age: 48
LOS: 31 days | Discharge: OTHER INSTITUTION | End: 2023-07-06
Attending: PSYCHIATRY & NEUROLOGY | Admitting: STUDENT IN AN ORGANIZED HEALTH CARE EDUCATION/TRAINING PROGRAM
Payer: MEDICAID

## 2023-06-05 ENCOUNTER — TELEPHONE (OUTPATIENT)
Dept: BEHAVIORAL HEALTH | Facility: CLINIC | Age: 48
End: 2023-06-05

## 2023-06-05 DIAGNOSIS — F25.1 SCHIZOAFFECTIVE DISORDER, DEPRESSIVE TYPE (H): Primary | ICD-10-CM

## 2023-06-05 PROBLEM — R45.851 SUICIDAL IDEATION: Status: ACTIVE | Noted: 2023-06-05

## 2023-06-05 PROCEDURE — 124N000001 HC R&B MH

## 2023-06-05 PROCEDURE — 250N000013 HC RX MED GY IP 250 OP 250 PS 637: Performed by: PSYCHIATRY & NEUROLOGY

## 2023-06-05 RX ORDER — LANOLIN ALCOHOL/MO/W.PET/CERES
3 CREAM (GRAM) TOPICAL
Status: DISCONTINUED | OUTPATIENT
Start: 2023-06-05 | End: 2023-07-06 | Stop reason: HOSPADM

## 2023-06-05 RX ORDER — OLANZAPINE 10 MG/1
10 TABLET ORAL 3 TIMES DAILY PRN
Status: DISCONTINUED | OUTPATIENT
Start: 2023-06-05 | End: 2023-07-06 | Stop reason: HOSPADM

## 2023-06-05 RX ORDER — MAGNESIUM HYDROXIDE/ALUMINUM HYDROXICE/SIMETHICONE 120; 1200; 1200 MG/30ML; MG/30ML; MG/30ML
30 SUSPENSION ORAL EVERY 4 HOURS PRN
Status: DISCONTINUED | OUTPATIENT
Start: 2023-06-05 | End: 2023-07-06 | Stop reason: HOSPADM

## 2023-06-05 RX ORDER — ACETAMINOPHEN 325 MG/1
650 TABLET ORAL EVERY 4 HOURS PRN
Status: DISCONTINUED | OUTPATIENT
Start: 2023-06-05 | End: 2023-07-06 | Stop reason: HOSPADM

## 2023-06-05 RX ORDER — OLANZAPINE 10 MG/2ML
10 INJECTION, POWDER, FOR SOLUTION INTRAMUSCULAR 3 TIMES DAILY PRN
Status: DISCONTINUED | OUTPATIENT
Start: 2023-06-05 | End: 2023-07-06 | Stop reason: HOSPADM

## 2023-06-05 RX ORDER — POLYETHYLENE GLYCOL 3350 17 G/17G
17 POWDER, FOR SOLUTION ORAL DAILY PRN
Status: DISCONTINUED | OUTPATIENT
Start: 2023-06-05 | End: 2023-07-06 | Stop reason: HOSPADM

## 2023-06-05 RX ORDER — HYDROXYZINE HYDROCHLORIDE 25 MG/1
25 TABLET, FILM COATED ORAL EVERY 4 HOURS PRN
Status: DISCONTINUED | OUTPATIENT
Start: 2023-06-05 | End: 2023-07-06 | Stop reason: HOSPADM

## 2023-06-05 RX ADMIN — OLANZAPINE 10 MG: 10 TABLET, FILM COATED ORAL at 21:50

## 2023-06-05 ASSESSMENT — ACTIVITIES OF DAILY LIVING (ADL)
DOING_ERRANDS_INDEPENDENTLY_DIFFICULTY: NO
DRESSING/BATHING_DIFFICULTY: NO
CHANGE_IN_FUNCTIONAL_STATUS_SINCE_ONSET_OF_CURRENT_ILLNESS/INJURY: NO
WALKING_OR_CLIMBING_STAIRS_DIFFICULTY: NO
ADLS_ACUITY_SCORE: 40
TOILETING_ISSUES: NO
ADLS_ACUITY_SCORE: 57
WEAR_GLASSES_OR_BLIND: NO
DIFFICULTY_EATING/SWALLOWING: NO
FALL_HISTORY_WITHIN_LAST_SIX_MONTHS: NO
CONCENTRATING,_REMEMBERING_OR_MAKING_DECISIONS_DIFFICULTY: NO

## 2023-06-05 NOTE — TELEPHONE ENCOUNTER
R: 4:58pm- [facility] staff notified PPS that Pt was accepted from [facility]. PPS reviewed preadmission encounter and completed Indicia.

## 2023-06-05 NOTE — TELEPHONE ENCOUNTER
S: [facility], Richard García calling at 1433.  48 year old/Male presenting with psychosis    B: Pt arrived via police. Pt presents with psychosis after being found walking the streets in [facility].  Pt affect in ED: Cooperative  Pt Dx: Schizoaffective Disorder  Previous IPMH hx? Yes  Pt denies SI. Pt denies SIB.   Pt denies HI. Pt denies hallucinations.   Hx of suicide attempt? No  Hx of aggression, or current concerns for aggression this visit? No  Pt is prescribed medication. Pt is not medication compliant  Pt denies OP services.  CD concerns: No  Acute medical concerns: Yes- pt not eating or drinking- labs WNL  Does Pt present with any of the following: assistive devices, insulin pump, J/G tube, catheter, CPAP, continuous IV, continuous O2, bariatric needs, ADA needs? No  Is Pt their own guardian? Yes  Pt is ambulatory  Pt is  able to perform ADLs independently    A: Pt meets criteria for review for IP admission. Patient on a 72-hour hold.   COVID: Negative  Utox: Negative  CMP: WNL  CBC: WNL  HCG: N/A    R: Patient accepted for behavioral bed placement: Yes        Accepted by Provider Dr. Conde    Admission to Inpatient Level of Care is indicated due to:     1. Patient risk of severity of behavioral health disorder is appropriate to proposed level of care as indicated by:   a. Imminent risk of harm to self No describe:   b. Imminent risk of harm to others No describe:   And/or  Behavioral health disorder is present and appropriate for inpatient care with both of the following:   a.  Severe psychiatric, behavioral or other comorbid conditions: Yes describe: Psychosis  b.  Severe dysfunction in daily living is present: Yes describe: Failure to thrive  2.  Inpatient mental health services are necessary to meet patient needs based on:   a. Specific condition related to admission diagnosis is present and will likely improve with treatment at an inpatient level of care: Yes  b. Specific condition  related to admission diagnosis will likely deteriorate in the absence of treatment at an inpatient level of care: Yes    3.  Situation and expectations are appropriate for inpatient care, as indicated by  one of the following:     A. Patient is unwilling to participate in treatment voluntarily and requires treatment. Yes   B. Is voluntary treatment at lower level of care feasible No  C. Around the clock medical and nursing care is for symptoms is required: No  D. Patient management at lower level of care is not appropriate and  biopsychosocial stressors may be contributing to clinical presentation. Yes

## 2023-06-06 PROCEDURE — 99223 1ST HOSP IP/OBS HIGH 75: CPT | Mod: AI | Performed by: PSYCHIATRY & NEUROLOGY

## 2023-06-06 PROCEDURE — 124N000001 HC R&B MH

## 2023-06-06 PROCEDURE — 250N000013 HC RX MED GY IP 250 OP 250 PS 637: Performed by: PSYCHIATRY & NEUROLOGY

## 2023-06-06 RX ORDER — OLANZAPINE 10 MG/1
10 TABLET, ORALLY DISINTEGRATING ORAL AT BEDTIME
Status: DISCONTINUED | OUTPATIENT
Start: 2023-06-06 | End: 2023-06-07

## 2023-06-06 RX ADMIN — OLANZAPINE 10 MG: 10 TABLET, ORALLY DISINTEGRATING ORAL at 20:31

## 2023-06-06 ASSESSMENT — ACTIVITIES OF DAILY LIVING (ADL)
DRESS: INDEPENDENT
ADLS_ACUITY_SCORE: 40
ORAL_HYGIENE: INDEPENDENT
ADLS_ACUITY_SCORE: 40
HYGIENE/GROOMING: INDEPENDENT
ADLS_ACUITY_SCORE: 40
LAUNDRY: UNABLE TO COMPLETE
ADLS_ACUITY_SCORE: 40

## 2023-06-06 NOTE — PLAN OF CARE
Problem: Adult Behavioral Health Plan of Care  Goal: Plan of Care Review  Recent Flowsheet Documentation  Taken 6/6/2023 0903 by Archie Jasso RN  Patient Agreement with Plan of Care: agrees  Goal: Patient-Specific Goal (Individualization)  Description: Patient will attend 75% of group programming.   Patient will eat 75% of meals.   Patient will sleep 6-8 hours per night.   Pt will be agreeable to treatment team recommendations     Outcome: Progressing  Note: He is awake in his room. His speech is soft and he mumbles at times. He asks multiple times about getting discharged. He has no insight into the reason he is in the hospital. When asked about why he is in the hospital, he doesn't answer clearly, he mumbles and then asks again about being released from the hospital. He denies issues with sleep. He denies issues with appetite. He is cooperative with direction from staff.   Goal: Develops/Participates in Therapeutic [place] to Support Successful Transition  Intervention: Foster Therapeutic [place]  Recent Flowsheet Documentation  Taken 6/6/2023 0903 by Archie Jasso RN  Trust Relationship/Rapport:   care explained   questions answered   questions encouraged   reassurance provided   thoughts/feelings acknowledged     Problem: Thought Process Alteration  Goal: Optimal Thought Clarity  Outcome: Progressing  Note: He does not have a linear reality based conversation. He answers simple questions. He is redirectable. He mumbles at times during conversation and is difficult to understand.

## 2023-06-06 NOTE — PLAN OF CARE
"Face to face end of shift report received from Archie LUNA RN. Rounding completed. Patient observed in bed, awake.     Pt has been calm, cooperative thi shift. He is difficult to understand- speech is very soft and he mumbles. He repeatedly asked for his \"release papers\". Informed pt that he is on a 72 hour hold and it doesn't  until . Pt has no evidence of learning and questions again. Attempted to explain again and pt stated \"Well I guess I'll just lay here then.\" He does not participate in nursing assessment. He denied any pain. Pt asked if he had any medications and offered him PRN Zyprexa. He declined stating \"I stopped taking my meds along time ago\". Pt continued to lay in bed throughout the evening. He did take his HS medication with a lot of encouragement. Behaviors have been appropriate. Frequent rounding.       Problem: Adult Behavioral Health Plan of Care  Goal: Patient-Specific Goal (Individualization)  Description: Patient will attend 75% of group programming.   Patient will eat 75% of meals.   Patient will sleep 6-8 hours per night.   Pt will be agreeable to treatment team recommendations     2023- Pt is not to wean due to increased confusion and attempting to elope at previous facility. Treatment to assess daily.     2023 1551 by Loreto Ludwig RN  Outcome: Progressing     Problem: Thought Process Alteration  Goal: Optimal Thought Clarity  Outcome: Progressing       Loreto Ludwig, DAVID  2023  3:51 PM    "

## 2023-06-06 NOTE — PLAN OF CARE
"ADMISSION NOTE    Reason for admission psychosis.  Safety concerns unsteady on feet at times.  Risk for or history of violence unknown.   Full skin assessment: bilaterally calloused and flaking skin on feet, long toe nails noted.  Patient denies pain in feet.     Patient arrived on unit from [facility] accompanied by security and transport staff on 6/5/2023  8:04 PM.   Status on arrival: anxious, cooperative  /89 (BP Location: Right leg, Patient Position: Sitting)   Pulse 90   Temp 98.2  F (36.8  C) (Tympanic)   Resp 16   Ht 1.778 m (5' 10\")   Wt 51 kg (112 lb 6.4 oz)   BMI 16.13 kg/m    Patient given tour of unit and Welcome to  unit papers given to patient, wanding completed, belongings inventoried, and admission assessment completed.   Patient's legal status on arrival is 72 hour hold. Appropriate legal rights discussed with and copy given to patient. Patient Bill of Rights discussed with and copy given to patient.   Patient denies SI, HI, and thoughts of self harm and contracts for safety while on unit.      Jennifer Silva RN  6/5/2023  10:02 PM    Patient arrived on unit, needs redirection to change into scrubs.  Wishes to change his socks in bathroom, appears embarrassed by feet.  Feet are noted to be brown and flaking skin.  Requests to take a shower.  Patient has snack, takes a shower.  Reassessment of feet, calloused and flaking no longer brown.  Patient denies pain.    Offered PRN Zyprexa, patient states \"yeah I will try that\"  \"I think I had that last night\"  Patient endorses \"I haven't been taking any meds, I'm skinny, I think I remember being here\"  \"can I shave do you have a razor? An electric thing?\"  \"or maybe I can have a comb, I need a comb\"    PRN:  Zyprexa 10 given @ 2150 along with comb.      Educated patient that once he is on the open unit he will be able to shave.  \"it's okay I want to be safe, I want to stay back here\"      Patient soft and rambling in " "conversation.  \"I need to get back to [place] by Wed, what time is it?  I will get back there?  I can take the transit, I know I got brought here by someone from [facility]\"              "

## 2023-06-06 NOTE — PROGRESS NOTES
"    Social Service Psychosocial Assessment    Presenting Problem:     The patient is a 48 year old male that was admitted for psychosis. Patient was brought in by EMS/DPD because he was found to be walking around town naked. He initial responded to police request to return home, however was later found to be walking around naked again.      Patient states he has not been eating or drinking lately.  Patient did not explain why.    Patient further shared he has been having difficulty sleeping.     Last admit to this unit August 18, 2021: Pt presented to the ED by Self and Family/Friends related to concerns for increasing paranoia, alcohol use, isolation, and concerns about visual hallucinations.     Marital Status: Not      Spouse / Children: None     Psychiatric TX HX:    Last admit to this unit August 18, 2021- concerns for increasing paranoia, alcohol use, isolation, and concerns about visual hallucinations.    The patient has worked with [facility] in the past. He last saw FERDINAND Soto CNP in Aug 2021. He followed Dr. Contreras regularly from [id number].     Has history of inpatient hospitalizations, last being 15 years ago. Pt has been to [facility]. Pt has not bee to our unit before. In the recent weeks pt has been having more paranoia, peering out windows feeling like the \"natives\" are out to get him and kill him. Has been in the basement isolating and on his phone in the dark and not interacting.     Suicide Risk Assessment:  Patient denies SI for admit, denies hx of SI or HI. Denies SI and HI today.     Last admit to this unit August 18, 2021: Pt denies feeling suicidal on admit and denies hx of SI. Pt denies any SI today.     Access to Lethal Means (explain):  Patient denies access to lethal means.     Family Psych HX:  Father - schizophrenic    A & Ox: X 3      Medication Adherence: Unknown, please refer to H&P     Medical Issues: Unknown, please refer to H&P      Visual -Motor " "Functioning: Good, no issues noticed at this time.      Communication Skills /Needs: Good, no issues noticed at this time.     Ethnicity: White          Spirituality/Congregation Affiliation: Uatsdin            Clergy Request: Not interested. Patient stated he does not go to [employer].      History: Patient denies hx of  service.     Living Situation:     Patient stated he is currently homeless.     Past admit August 2021: Patient was living with their mom for about 2 months. Patient was living in [facility] and on the streets. He was living in the woods and a garage.      ADL s: Independent    Education: Graduated High school    Financial Situation: Patient is on social security      Occupation: Unemployed at the movement     Leisure & Recreation: Fishing, and being in the woods.     Childhood History: Pt lived with mom and dad growing up. Parent got . \"it was hard\".       Trauma Abuse HX: Patient chose not to answer the questions.     Relationship / Sexuality: Single       Substance Use/ Abuse:    Patient's Utox was negative 6/04/2023.     Last admit to this unit August 18, 2021: Utox positive for Benzodiazepines. History of alcoholism but stated he has maybe had one drink in 3 weeks. Pt denies and other substance abuse.     Chemical Dependency Treatment HX:     Not interested at this time and patient does not feel the need.     Last admit to this unit August 18, 2021: Pt states they had a rule 25 at [facility] in [place] and they reccomended out patient. Pt is unsure how long ago this was. Pt has been to a cd treatment back in 2000.    Legal Issues: Pt denies having legal issues.     Significant Life Events: Being unemployed and homeless.     Strengths: In a safe environment, open to services    Challenges /Limitation: Hx of cd and substance use issues, current mental health, poor coping skills, homelessness.     Patient Support Contact (Include name, relationship, number, and " summary of conversation):     No DEBBIE signed at this time.     Interventions:         Community-Based Programs- Would benefit form services      Medical/Dental Care- No PCP on file - Needs and would benefit     Has seen:   Lenny Tillman MD    NPI: [id number]    [address] [place] [address] [phone] (Work)    [phone] (Fax)       CD Evaluation/Rule 25/Aftercare- Hx of the needs for services. Patient denies the needs of services at this time.     Medication Management- Would Benefit from the establishment of psychiatry services.     Has seen:   Miguelina Kapadia APRN, CNP    NPI: [id number]    [address] [place] [address] [phone] (Work)    [phone] (Fax)        Individual Therapy- Would benefit.       Housing/Placement- Homeless and would benefit from services       Insurance Coverage- MEDICAID [place]       Financial Assistance- Social Security for disability       Commit/Powers Screening- ???? Under a 72 hour hold       Suicide Risk Assessment-   Patient denies SI for admit, denies hx of SI or HI. Denies SI and HI today.     Last admit to this unit August 18, 2021:Pt denies feeling suicidal on admit and denies hx of SI. Pt denies any SI today.       High Risk Safety Plan- Talk to supports; Call crisis lines; Go to local ER if feeling suicidal.

## 2023-06-06 NOTE — MEDICATION SCRIBE - ADMISSION MEDICATION HISTORY
Medication Scribe Admission Medication History    Admission medication history is complete. The information provided in this note is only as accurate as the sources available at the time of the update.    Medication reconciliation/reorder completed by provider prior to medication history? No    Information Source(s): Patient and CareEverywhere/SureScripts via in-person    Pertinent Information:     Patient denies taking any prescribed medications, nor is supposed to be taking any prescribed medications, at this time (scheduled and/or PRN).     Patient denies taking any OTC vitamins, supplements or analgesics.     No conflicting data from any available outside sources.     Changes made to PTA medication list:    Added: None    Deleted: None    Changed: olanzapine, nicotine gum, seroquel- historical, pt has been on in the past, however- denies currently taking. Removed by [facility], no recent fills found.    Medication Affordability:  Not including over the counter (OTC) medications, was there a time in the past 3 months when you did not take your medications as prescribed because of cost?: No (not taking rx medications at this time.)    Allergies reviewed with patient and updates made in EHR: yes-NKDA    Medication History Completed By: Lori Holguin 6/6/2023 11:36 AM    Prior to Admission medications    Not on File        The patient is a 17y Female complaining of abrasion.

## 2023-06-06 NOTE — H&P
"[facility]    History and Physical  Medical Services       Date of Admission:  6/5/2023  Date of Service (when I saw the patient): 06/06/23    Assessment & Plan     Principal Problem:    Suicidal ideation    Labs reviewed, unremarkable.     Pt medically stable, no acute medical concerns. Chronic medical problems stable. Will sign off. Please consult for any new medical issues or concerns.        Code Status: Full Code    Veronica Mcqueencaron, CNP    Primary Care Physician   Physician No Ref-Primary    Chief Complaint   Psych evaluation     History is obtained from the patient and medical chart     History of Present Illness   (per ED) Patient is a 48-year-old male brought to the emergency department by [facility] after he had contact with [employer] police.  Patient apparently was found walking around little naked earlier in the day and they intervened and got him to go home but this afternoon he was found doing the same thing, walking around the streets naked.  They also have concerns about his welfare because he has had obvious weight loss for the past few months.  Patient states that he has mild headache and mild abdominal pain but generally \"feels okay\" he would like to leave.  DPD placed him on a hold.  Patient notes he has had a chronic cough.    Past Medical History    I have reviewed this patient's medical history and updated it with pertinent information if needed.   No past medical history on file.    Past Surgical History   I have reviewed this patient's surgical history and updated it with pertinent information if needed.  No past surgical history on file.    Prior to Admission Medications   None     Allergies   No Known Allergies    Social History   I have reviewed this patient's social history and updated it with pertinent information if needed. Andre Bustos      Family History   I have reviewed this patient's family history and updated it with pertinent information if needed.   No family history " on file.    Review of Systems   Review of systems is limited by patient factors - abnormal mental status    Physical Exam   Temp: 98.2  F (36.8  C) Temp src: Tympanic BP: 96/60 Pulse: 87   Resp: 20 SpO2: 98 % O2 Device: None (Room air)    Vital Signs with Ranges  Temp:  [98.2  F (36.8  C)] 98.2  F (36.8  C)  Pulse:  [87-90] 87  Resp:  [16-20] 20  BP: ()/(60-89) 96/60  SpO2:  [98 %] 98 %  112 lbs 6.4 oz    Constitutional: awake, alert, cooperative, no apparent distress, and appears stated age. disheveled  Eyes: Lids and lashes normal, pupils equal, round and reactive to light, extra ocular muscles intact, sclera clear, conjunctiva normal  ENT: Normocephalic, without obvious abnormality, atraumatic, external ears without lesions, oral pharynx with moist mucous membranes, no erythema or exudates  Hematologic / Lymphatic: no cervical lymphadenopathy  Respiratory: No increased work of breathing, good air exchange, clear to auscultation bilaterally, no crackles or wheezing  Cardiovascular: Normal apical impulse, regular rate and rhythm, normal S1 and S2, no S3 or S4, and no murmur noted  GI:  normal bowel sounds, soft, non-distended, non-tender, no masses palpated, no hepatosplenomegally  Genitounirinary: deferred  Skin: normal skin color, texture, turgor and no redness, warmth, or swelling  Musculoskeletal: There is no redness, warmth, or swelling of the joints.  Full range of motion noted.   Neurologic: Awake, alert, oriented to name, place and reoriented to time.    Neuropsychiatric: General: mumbles, calm and poor eye contact    Data   Data reviewed today:   No lab results found in last 7 days.    No results found for this or any previous visit (from the past 24 hour(s)).

## 2023-06-06 NOTE — PLAN OF CARE
Face to face shift report received from nurse. Rounding completed, pt observed.    Problem: Adult Behavioral Health Plan of Care  Goal: Patient-Specific Goal (Individualization)  Description: Patient will attend 75% of group programming.   Patient will eat 75% of meals.   Patient will sleep 6-8 hours per night.   Pt will be agreeable to treatment team recommendations     Outcome: Progressing   Pt has been in bed with eyes closed and regular respirations observed all night. Will continue to monitor. Patient slept 7 hours. No issues noted.     Face to face report will be communicated to oncoming RN.    Walter Keith RN  6/6/2023

## 2023-06-07 PROCEDURE — 99233 SBSQ HOSP IP/OBS HIGH 50: CPT | Mod: GT | Performed by: PSYCHIATRY & NEUROLOGY

## 2023-06-07 PROCEDURE — 250N000013 HC RX MED GY IP 250 OP 250 PS 637: Performed by: PSYCHIATRY & NEUROLOGY

## 2023-06-07 PROCEDURE — 124N000001 HC R&B MH

## 2023-06-07 RX ADMIN — OLANZAPINE 15 MG: 10 TABLET, ORALLY DISINTEGRATING ORAL at 21:13

## 2023-06-07 ASSESSMENT — ACTIVITIES OF DAILY LIVING (ADL)
ADLS_ACUITY_SCORE: 40
HYGIENE/GROOMING: INDEPENDENT
ORAL_HYGIENE: INDEPENDENT
LAUNDRY: UNABLE TO COMPLETE
ADLS_ACUITY_SCORE: 40
HYGIENE/GROOMING: INDEPENDENT
ADLS_ACUITY_SCORE: 40
DRESS: SCRUBS (BEHAVIORAL HEALTH)
ADLS_ACUITY_SCORE: 40
DRESS: SCRUBS (BEHAVIORAL HEALTH);INDEPENDENT
ADLS_ACUITY_SCORE: 40
ORAL_HYGIENE: INDEPENDENT
ADLS_ACUITY_SCORE: 40

## 2023-06-07 NOTE — H&P
"  [facility] PSYCHIATRY  -  HISTORY AND PHYSICAL     ADMISSION DATA     Andre Bustos MRN# [id number]   Age: 48 year old YOB: 1975     Date of Admission: 6/5/2023  Primary Physician: No Ref-Primary, Physician   Referral Area: Referral Area: Outside Emergency Department       CHIEF COMPLAINT   Reason for Admit/Consult: Psychosis / kyleigh symptoms       HISTORY OF PRESENT ILLNESS   Andre Bustos is a 48 year old male with a past psychiatric history notable for schizoaffective disorder, bipolar type and alcohol use disorder (associated with alcohol withdrawal seizures). Prior inpatient psychiatric hospitalizations, last known in Sept 2021 at [facility]. Previously psychiatric provider, Dr. Contreras from [id number] and most recently Miguelina STREETER CNP in August 2021. Presents to outside emergency department accompanied by law enforcement after being found disorganized twice in the community in same day, walking around naked. Patient was subsequently transferred and accepted for inpatient psychiatric hospitalization at [facility] for further safety and further stabilization     Prior to seeing the patient, I had the opportunity to review the medical record. Per outside ED, \"Andre is a 48 year old male who presented via EMS/DPD to the [facility] Emergency Department on 6/4/2023 with psychosis. Psych consult service has been asked by Earnest Myers MD to evaluate patient.    Patient was brought in by EMS/DPD because he was found to be walking around town naked. He initial responded to police request to return home, however was later found to be walking around naked again.     Attempted to interview Andre for mental health assessment. He would answer brief questions, but was primarily focused on wanting a pair of flip flops and wanting to discharge and \"head down that way.\" He also repeatedly said he should have never come in yesterday. He is " "aware that he came in by ambulance. He states he has not been eating or drinking lately, but is unable to say why. He also states he hasn't been sleeping, but again is unable to say what has been in the way of his ability to sleep. He does say he is homeless. UDS was negative for substances.\" \"Patient is a 48-year-old who was brought in by police because he was running around naked. He had apparently been found walking around naked earlier in the day and brought back to his home. He then apparently decided to participate in the same behavior. He was brought in for further evaluation. There have been no events overnight. He has not yet been seen by PCS and is awaiting screening. Seems per discussion that he has a history of schizoaffective disorder and there were concerns about him having some weight loss over the past few months.    He has been refusing blood work, but did provide a UA which was negative.\"    On psychiatric interview, Andre is met within the MHICU. He is acutely disorganized. He asked repeatedly if he can leave. He is explained that he is on a 72 hour hold, will say \"okay\" and then within 30 seconds ask if he can go home today again. He is having difficulty having a linear conversation. He will ask for a comb. He then makes comments that he is \"skinny\" and when he is asked if he is getting enough to eat, he then states that he is not hungry.         REVIEW OF PSYCHIATRIC SYSTEMS   I do not elicit symptoms consistent with agoraphobia, social anxiety, panic disorder, OCD, ADHD, an eating disorder and pain     REVIEW OF MEDICAL SYSTEMS   14-point medical review of systems is negative other than noted in the HPI.     PSYCHIATRIC HISTORY   Prior psychiatric diagnoses: schizoaffective disorder, alcohol use disorder  Psychiatric Hospitalizations: multiple, last in 2021  Prior Psychiatric Prescriber: none currently  Prior/Current Therapist: none currently  Past Psychotropic Medication Trials: Not currently " taking any medication. From last inpatient stay he was discharged on 100 mg quetiapine 3 times daily, and olanzapine 30 mg every evening.   Per documentation in the chart it appears patient had about a 10-year period of relative stability on Zyprexa.  In the past he has been on olanzapine, quetiapine, and temazepam according to chart review.       FAMILY HISTORY   Adopted: No Per chart review there is a paternal history of schizophrenia.       SUBSTANCE USE HISTORY   History   Drug Use Not on file       Social History    Substance and Sexual Activity      Alcohol use: Not on file      History   Smoking Status     Not on file   Smokeless Tobacco     Not on file     Prior history of alcohol abuse  UDS on admission negative       SOCIAL HISTORY   Social History     Socioeconomic History     Marital status: Single     Spouse name: Not on file     Number of children: Not on file     Years of education: Not on file     Highest education level: Not on file   Occupational History     Not on file   Tobacco Use     Smoking status: Not on file     Smokeless tobacco: Not on file   Substance and Sexual Activity     Alcohol use: Not on file     Drug use: Not on file     Sexual activity: Not on file   Other Topics Concern     Not on file   Social History Narrative     Not on file     Social Determinants of Health     Financial Resource Strain: Not on file   Food Insecurity: Not on file   Transportation Needs: Not on file   Physical Activity: Not on file   Stress: Not on file   Social Connections: Not on file   Intimate Partner Violence: Not on file   Housing Stability: Not on file     Patient is single and does not have children. He is currently homeless. According to chart review he grew up in [place] where he lived with his mother and father until they . He did graduate from high school.         PAST MEDICAL HISTORY   No past medical history on file.    No past surgical history on file.    Patient has no known  "allergies.     PHYSICAL EXAM   I have reviewed the physical exam as documented by the medical team and agree with findings and assessment and have no additional findings to add at this time.  General: Awake and alert, NAD  HEENT: EOMI, no scleral icterus, no injection of conjunctivae, moist mucus membranes  Respiratory: Breathing comfortably   Extremities: No cyanosis, clubbing, or edema   Skin: No gross rash, no bruising  Neuro: CN II-XII intact, no focal deficits      VITALS   Vitals: /67   Pulse 99   Temp 98.3  F (36.8  C) (Tympanic)   Resp 16   Ht 1.778 m (5' 10\")   Wt 51 kg (112 lb 6.4 oz)   SpO2 98%   BMI 16.13 kg/m       MENTAL STATUS EXAM   Appearance:  awake,disheveled  Attitude:  cooperative  Eye Contact:  Poor  Mood:  Not stated  Affect: flat  Speech:  mumbles  Psychomotor Behavior:  no evidence of tardive dyskinesia, dystonia, or tics  Thought Process: illogical  Associations:  no loose associations  Thought Content:  no evidence of suicidal ideation or homicidal ideation  Insight:  limited  Judgment:  limited  Oriented to: oriented to person and place only  Attention Span and Concentration:  impaired  Recent and Remote Memory:  impaired  Gait and Station: Normal      LABS   No results found for this or any previous visit (from the past 24 hour(s)).      ASSESSMENT   Summary: Andre Bustos is a 48 year old male with a past psychiatric history notable for schizoaffective disorder, bipolar type and alcohol use disorder (associated with alcohol withdrawal seizures). Prior inpatient psychiatric hospitalizations, last known in Sept 2021 at [facility]. Previously psychiatric provider, Dr. Contreras from [id number] and most recently Miguelina STREETER CNP in August 2021. Presents to outside emergency department accompanied by law enforcement after being found disorganized twice in the community in same day, walking around naked. Patient was subsequently transferred and accepted for inpatient " psychiatric hospitalization at [facility] for further safety and further stabilization     -admit  -on 72 hour hold  -will likely file for commitment  -initiate olanzapine 10 mg at bedtime (previously responded well to 30 mg at bedtime)       DIAGNOSTIC FORMULATION   #schizoaffective disorder, depressed type  #alcohol use disorder, remission status unknown     PLAN   Admit patient to [facility], Location: Unit 5     Legal Status: 72 hour hold    Safety Assessment:    Behavioral Orders   Procedures     Code 1 - Restrict to Unit     Routine Programming     As clinically indicated     Status 15     Every 15 minutes.      Imminent risk of harm in a setting less restrictive than an inpatient psychiatric facility is determined to be medium to high.     PTA medications held:   - none (not taking for extended period of time)    PTA medications continued/changed:   -none (nonadherent)    New medications initiated:   -olanzapine 10 mg at bedtime     Programming: Patient will be treated in a therapeutic milieu with appropriate individual and group therapies. Education will be provided on diagnoses, medications, and treatments.     Medical diagnoses:  Per medicine    Diagnostic studies and consultations:  No additional studies or tests recommended on admission.     Level of care required: Acute psychiatric hospitalization    Justification for hospitalization and estimated length of stay: reasons for hospitalization include potential safety risk to self or others within the last week, decreased functioning in outpatient setting and in the setting of no outpatient management, need for highly structured inpatient management for stabilization of psychiatric symptoms, need for psychiatric medication initiation and stabilization.  Estimated length of stay is 10+ days      Total time: 80 minutes       ATTESTATION    Pedro Conde MD  [facility]   Psychiatry    Video  Visit: Patient has given verbal consent for video visit?: Yes  Type of Service: video visit for mental health treatment  Reason for Video Visit: COVID-19 [place]  Originating Site (patient location): [facility]  Distant Site (provider location): Remote Location  Mode of Communication: Video Conference via [employer]  Time of Service: Date: June 6, 2023 , Start: 11:00 end: 12:00

## 2023-06-07 NOTE — PROGRESS NOTES
[facility] called to screen the patient for committment. The patient refused to speak to the screener.

## 2023-06-07 NOTE — PLAN OF CARE
"Problem: Adult Behavioral Health Plan of Care  Goal: Patient-Specific Goal (Individualization)  Description: Patient will attend 75% of group programming.   Patient will eat 75% of meals.   Patient will sleep 6-8 hours per night.   Pt will be agreeable to treatment team recommendations     6/6/2023- Pt is not to wean due to increased confusion and attempting to elope at previous facility. Treatment to assess daily.   Outcome: Progressing  Note: Face to face end of shift report received from DAVID Carrero. Rounding completed. Patient observed resting in bed, awake.     Day Paige RN  6/7/2023  4:07 PM     When pt was asked how he was doing tonight, he stated \"not good.\" Pt did not elaborate further. Pt was soft spoken and guarded in conversation. He did admit to some anxiety and depression. He denied AVH, paranoia, SI, and HI. He ate 100% of supper. He was compliant with his scheduled medication.     Continuity of care provided by this writer for [id number] shift. Pt appeared to be sleeping 6 hours tonight.    Face to face end of shift report communicated to oncelyssa RN.      Problem: Thought Process Alteration  Goal: Optimal Thought Clarity  Outcome: Progressing  "

## 2023-06-07 NOTE — PLAN OF CARE
Problem: Thought Process Alteration  Goal: Optimal Thought Clarity  Outcome: Progressing     Problem: Adult Behavioral Health Plan of Care  Goal: Patient-Specific Goal (Individualization)  Description: Patient will attend 75% of group programming.   Patient will eat 75% of meals.   Patient will sleep 6-8 hours per night.   Pt will be agreeable to treatment team recommendations     6/7/2023- Pt is not to wean due to increased confusion and attempting to elope at previous facility. Treatment to assess daily.   Outcome: Progressing     Patient has been isolative and withdrawn to his room much of the day. No wean at this time due to unpredictable behavior.  He is unable to participate in nursing assessment and just mumbles through conversation.  Was able to ask when he can go home.  This writer explained that he is on a 72 hour hold but patient shows no evidence of understanding.  Ate 100% of meals.  No complaints of pain.  VS WNL.  Face to face end of shift report communicated to evening shift RN.     Alise Valdez RN  6/7/2023  2:57 PM

## 2023-06-07 NOTE — PROGRESS NOTES
CITLALLI submitted petition for commitment to [facility].       CITLALLI called [facility]  for [facility] and he confirmed that he received the Petition for Commitment.

## 2023-06-07 NOTE — PROGRESS NOTES
"CLINICAL NUTRITION SERVICES  -  ASSESSMENT NOTE    Andre Bustos : Admission Nutrition Risk Screen - 34+lb weight loss, reduced appetite/intake    48 yom admitted for suicidal ideation. Medical/psych hx includes schizoaffective disorder, bipolar type and alcohol use disorder. Weight loss of 40lbsnoted- unsure of timeframe. Usual weight was around 150-155lb. Pt was not eating and drinking prior to admission, he does not know why. Good appetite, adequate intake for most meals this admission.     Diet Order: Regular  Intake: 4 meals with 100%, 75%, 100%, 0%    Height: 5' 10\"  Weight: 112 lbs 6.4 oz  Body mass index is 16.13 kg/m .  Weight Status:  Underweight BMI <18.5  Weight History:   Wt Readings from Last 10 Encounters:   06/05/23 51 kg (112 lb 6.4 oz)   08/18/21 68.9 kg (151 lb 12.8 oz)        Weight used to calculate needs: 68.6kg ibw  Estimated Energy Needs: 7491-5072 kcals (30-35 Kcal/Kg)   Estimated Protein Needs:  grams protein (1-1.5 g pro/Kg)    MALNUTRITION:  % Weight Loss:  Unable to determine timeframe  % Intake:  </= 75% for >/= 1 month (severe malnutrition)  Muscle and Subcutaneous Fat Loss:  moderate    Malnutrition Diagnosis: Severe malnutrition  In Context of:  Environmental or social circumstances    NUTRITION DIAGNOSIS:  Malnutrition related to inadequate energy/protein intake as evidenced by weight loss and loss of muscle and subcutaneous fat.    NUTRITION RECOMMENDATIONS  - Encourage intake during meal times  - Ensure with meals    MONITORING AND EVALUATION:  RD will monitor intake, weight, labs       "

## 2023-06-07 NOTE — PLAN OF CARE
Goal Outcome Evaluation:  Problem: Adult Behavioral Health Plan of Care  Goal: Patient-Specific Goal (Individualization)  Description: Patient will attend 75% of group programming.   Patient will eat 75% of meals.   Patient will sleep 6-8 hours per night.   Pt will be agreeable to treatment team recommendations     6/6/2023- Pt is not to wean due to increased confusion and attempting to elope at previous facility. Treatment to assess daily.   Outcome: Progressing     Problem: Thought Process Alteration  Goal: Optimal Thought Clarity  Outcome: Progressing   Face to face end of shift report received from Loreto HERNANDEZ. Rounding completed. Patient observed resting in bed at beginning of shift.     Pt appeared to be sleeping for 7 hours.         Face to face end of shift report given to Saranya Nguyen RN  6/7/2023

## 2023-06-07 NOTE — PROGRESS NOTES
"  [facility] PSYCHIATRY  -  PROGRESS NOTE     ID   Name: Andre Bustos  MRN#: [id number]     SUBJECTIVE   Prior to interviewing the patient, I met with nursing and reviewed patient's clinical condition. We discussed clinical care both before and after the interview. I have reviewed the patient's clinical course by review of records including previous notes, labs, and vital signs.     Per nursing, the patient had the following behavioral events over the last 24-hours: isolative to room. Did take at bedtime olanzapine    On psychiatric interview, Andre is met within his room. He is slightly more linear in conversation today but does not say much. He is informed that we are petitioning the [facility] for commitment.  He states \"so can I go home?\" , he is informed he can't at the present moment.  When asking about his olanzapine last night, he states \"I got sleep\".  He acknowledges that he was on 30 mg at bedtime olanzapine previously and when asked why he stopped this medication he states \"I dunno\" and acknowledged that it is helpful.  Can't explain why he is in the hospital or discuss concerns for walking naked in the community.        MEDICATIONS   Scheduled Meds:    OLANZapine zydis  15 mg Oral At Bedtime     PRN Meds:.acetaminophen, alum & mag hydroxide-simethicone, hydrOXYzine, melatonin, OLANZapine **OR** OLANZapine, polyethylene glycol     ALLERGIES   No Known Allergies     VITALS   Vitals: /59   Pulse 93   Temp 98.3  F (36.8  C) (Tympanic)   Resp 16   Ht 1.778 m (5' 10\")   Wt 51 kg (112 lb 6.4 oz)   SpO2 98%   BMI 16.13 kg/m       MENTAL STATUS EXAM   Appearance:  awake,disheveled  Attitude:  cooperative  Eye Contact:  Poor  Mood:  \"can i go home?\"  Affect: flat  Speech:  mumbles  Psychomotor Behavior:  no evidence of tardive dyskinesia, dystonia, or tics  Thought Process: illogical  Associations:  no loose associations  Thought Content:  no evidence of suicidal ideation or homicidal " ideation  Insight:  limited  Judgment:  limited  Oriented to: oriented to person and place only  Attention Span and Concentration:  impaired  Recent and Remote Memory:  impaired  Gait and Station: Normal        LABS   No results found for this or any previous visit (from the past 24 hour(s)).      ASSESSMENT   Andre Bustos is a 48 year old male with a past psychiatric history notable for schizoaffective disorder, bipolar type and alcohol use disorder (associated with alcohol withdrawal seizures). Prior inpatient psychiatric hospitalizations, last known in Sept 2021 at [facility]. Previously psychiatric provider, Dr. Contreras from [id number] and most recently Miguelina STREETER CNP in August 2021. Presents to outside emergency department accompanied by law enforcement after being found disorganized twice in the community in same day, walking around naked. Patient was subsequently transferred and accepted for inpatient psychiatric hospitalization at [facility] for further safety and further stabilization     Daily Progress: is slightly more linear in conversation today, suspect secondary to olanzapine. Will increase olanzapine to 15 mg at bedtime, noting he was previously on 30 mg olanzapine dose which was stabilizing for him.        DIAGNOSTIC FORMULATION   #schizoaffective disorder, depressed type  #alcohol use disorder, remission status unknown     PLAN     Location: Unit 5  Legal Status: Orders Placed This Encounter      Voluntary    Safety Assessment:    Behavioral Orders   Procedures     Code 1 - Restrict to Unit     Routine Programming     As clinically indicated     Status 15     Every 15 minutes.      PTA medications held:   - none (not taking for extended period of time)     PTA medications continued/changed:   -none (nonadherent)     New medications initiated:   -olanzapine 10 mg at bedtime --> increase to 15 mg at bedtime (6/7/23)    Today's Changes:  - increase  olanzapine to 15 mg at bedtime (6/7/23)    Programming: Patient will be treated in a therapeutic milieu with appropriate individual and group therapies. Education will be provided on diagnoses, medications, and treatments. Encouraged behavioral activation and participation in group programming.     Medical diagnoses:  Per medicine    Disposition: pending clinical course        TREATMENT TEAM CARE PLAN     Progress: Continued symptoms.    Continued Stay Criteria/Rationale: Continued symptoms without sufficient improvement/resolution.    Medical/Physical: See above.    Precautions: See above.     Plan: Continue inpatient care with unit support and medication management.    Rationale for change in precautions or plan: NA due to no change.    Participants: Pedro Conde MD, Nursing, SW, OT.    The patient's care was discussed with the treatment team and chart notes were reviewed.       ATTESTATION    Pedro Conde MD  [facility]   Psychiatry    Video Visit: Patient has given verbal consent for video visit?: Yes  Type of Service: video visit for mental health treatment  Reason for Video Visit: COVID-19 [place]  Originating Site (patient location): [facility], [place]  Distant Site (provider location): Remote Location  Mode of Communication: Video Conference via [employer]  Time of Service: Date: 06/07/2023 , Start: 13:00 end: 13:30

## 2023-06-08 PROCEDURE — 250N000013 HC RX MED GY IP 250 OP 250 PS 637: Performed by: PSYCHIATRY & NEUROLOGY

## 2023-06-08 PROCEDURE — 99232 SBSQ HOSP IP/OBS MODERATE 35: CPT | Mod: GT | Performed by: PSYCHIATRY & NEUROLOGY

## 2023-06-08 PROCEDURE — 124N000001 HC R&B MH

## 2023-06-08 RX ADMIN — OLANZAPINE 15 MG: 10 TABLET, ORALLY DISINTEGRATING ORAL at 20:28

## 2023-06-08 ASSESSMENT — ACTIVITIES OF DAILY LIVING (ADL)
ADLS_ACUITY_SCORE: 40
LAUNDRY: UNABLE TO COMPLETE
DRESS: SCRUBS (BEHAVIORAL HEALTH)
ORAL_HYGIENE: INDEPENDENT
ADLS_ACUITY_SCORE: 40
HYGIENE/GROOMING: INDEPENDENT
ADLS_ACUITY_SCORE: 40
ORAL_HYGIENE: INDEPENDENT
ADLS_ACUITY_SCORE: 40
DRESS: SCRUBS (BEHAVIORAL HEALTH);INDEPENDENT
ADLS_ACUITY_SCORE: 40
HYGIENE/GROOMING: INDEPENDENT
ADLS_ACUITY_SCORE: 40
ADLS_ACUITY_SCORE: 40

## 2023-06-08 NOTE — PROGRESS NOTES
The patient received his [facility] [address] paper work with his upcoming court hearing dates.     Preliminary Hearing 6/13/2023 @ 9:10 am.    Commitment Hearing 6/20/2023 @ 8:30 am.

## 2023-06-08 NOTE — PLAN OF CARE
Problem: Thought Process Alteration  Goal: Optimal Thought Clarity  Outcome: Progressing     Problem: Adult Behavioral Health Plan of Care  Goal: Patient-Specific Goal (Individualization)  Description: Patient will attend 75% of group programming.   Patient will eat 75% of meals.   Patient will sleep 6-8 hours per night.   Pt will be agreeable to treatment team recommendations     6/8/2023- Patient may wean to the open unit if behavior is appropriate. Treatment to assess daily.   Outcome: Progressing     Patient was isolative and withdrawn to his room much of the day.  Offered to have patient wean to the open unit but he declined.  Limited answers given during nursing assessment.  Only answers yes and no or I don't know.   Patient did eat 100% of meals and is able to make needs known to staff.  No scheduled or PRN medications given this shift.  Affect if blunted and flat.  No complaints of pain.  VS WNL.  Face to face end of shift report communicated to evening shift RN.     Alise Valdez RN  6/8/2023  2:07 PM

## 2023-06-08 NOTE — PROGRESS NOTES
"  [facility] PSYCHIATRY  -  PROGRESS NOTE     ID   Name: Andre Bustos  MRN#: [id number]     SUBJECTIVE   Prior to interviewing the patient, I met with nursing and reviewed patient's clinical condition. We discussed clinical care both before and after the interview. I have reviewed the patient's clinical course by review of records including previous notes, labs, and vital signs.     Per nursing, the patient had the following behavioral events over the last 24-hours: refused to speak with [facility] screener yesterday. Stated multiple times he was \"not good\" but unable to elaborate.     On psychiatric interview, Andre is met within his room. Andre states he is \"alright I guess\".  When asked how his day was yesterday, he states, \"I dunno\".  When asked why he did not speak with the [facility] yesterday, he states \"I did not talk to them\".  He reports the olanzapine helped him sleep last night.  He states his anxiety is very high.   When asked why he was walking around naked in the community, he states \"I do not even know so\".   When asked what he thinks is helpful, he states \"I dunno\".   He has no questions or concerns today.  He was informed we will increase olanzapine to 15 mg at bedtime.          MEDICATIONS   Scheduled Meds:    OLANZapine zydis  15 mg Oral At Bedtime     PRN Meds:.acetaminophen, alum & mag hydroxide-simethicone, hydrOXYzine, melatonin, OLANZapine **OR** OLANZapine, polyethylene glycol     ALLERGIES   No Known Allergies     VITALS   Vitals: BP 96/58 (BP Location: Left arm)   Pulse 98   Temp 98.9  F (37.2  C) (Tympanic)   Resp 16   Ht 1.778 m (5' 10\")   Wt 51 kg (112 lb 6.4 oz)   SpO2 98%   BMI 16.13 kg/m       MENTAL STATUS EXAM   Appearance:  awake,disheveled  Attitude:  cooperative  Eye Contact:  Poor  Mood:  \"fine\"  Affect: flat  Speech:  mumbles  Psychomotor Behavior:  no evidence of tardive dyskinesia, dystonia, or tics  Thought Process: illogical  Associations:  no loose " associations  Thought Content:  no evidence of suicidal ideation or homicidal ideation  Insight:  limited  Judgment:  limited  Oriented to: oriented to person and place only  Attention Span and Concentration:  impaired  Recent and Remote Memory:  impaired  Gait and Station: Normal        LABS   No results found for this or any previous visit (from the past 24 hour(s)).      ASSESSMENT   Andre Bustos is a 48 year old male with a past psychiatric history notable for schizoaffective disorder, bipolar type and alcohol use disorder (associated with alcohol withdrawal seizures). Prior inpatient psychiatric hospitalizations, last known in Sept 2021 at [facility]. Previously psychiatric provider, Dr. Contreras from [id number] and most recently Miguelina STREETER CNP in August 2021. Presents to outside emergency department accompanied by law enforcement after being found disorganized twice in the community in same day, walking around naked. Patient was subsequently transferred and accepted for inpatient psychiatric hospitalization at [facility] for further safety and further stabilization     Daily Progress: does not offer much of anything this AM. Does state that his anxiety is high and is agreeable to continue to increase olanzapine. Refused commitment screening yesterday.        DIAGNOSTIC FORMULATION   #schizoaffective disorder, depressed type  #alcohol use disorder, remission status unknown     PLAN     Location: Unit 5  Legal Status: Orders Placed This Encounter      Voluntary    Safety Assessment:    Behavioral Orders   Procedures     Code 1 - Restrict to Unit     Routine Programming     As clinically indicated     Status 15     Every 15 minutes.      PTA medications held:   - none (not taking for extended period of time)     PTA medications continued/changed:   -none (nonadherent)     New medications initiated:   -olanzapine 10 mg at bedtime --> increase to 15 mg at bedtime  (6/7/23)    Today's Changes:  -continue  olanzapine at 15 mg at bedtime    Programming: Patient will be treated in a therapeutic milieu with appropriate individual and group therapies. Education will be provided on diagnoses, medications, and treatments. Encouraged behavioral activation and participation in group programming.     Medical diagnoses:  Per medicine    Disposition: pending clinical course        TREATMENT TEAM CARE PLAN     Progress: Continued symptoms.    Continued Stay Criteria/Rationale: Continued symptoms without sufficient improvement/resolution.    Medical/Physical: See above.    Precautions: See above.     Plan: Continue inpatient care with unit support and medication management.    Rationale for change in precautions or plan: NA due to no change.    Participants: Pedro Conde MD, Nursing, SW, OT.    The patient's care was discussed with the treatment team and chart notes were reviewed.       ATTESTATION    Pedro Conde MD  [facility]   Psychiatry    Video Visit: Patient has given verbal consent for video visit?: Yes  Type of Service: video visit for mental health treatment  Reason for Video Visit: COVID-19 [place]  Originating Site (patient location): [facility]  Distant Site (provider location): Remote Location  Mode of Communication: Video Conference via [employer]  Time of Service: Date: 06/08/2023 , Start: 13:00 end: 13:30

## 2023-06-09 PROCEDURE — 250N000013 HC RX MED GY IP 250 OP 250 PS 637: Performed by: PSYCHIATRY & NEUROLOGY

## 2023-06-09 PROCEDURE — 124N000001 HC R&B MH

## 2023-06-09 PROCEDURE — 99232 SBSQ HOSP IP/OBS MODERATE 35: CPT | Mod: GT | Performed by: PSYCHIATRY & NEUROLOGY

## 2023-06-09 RX ORDER — OLANZAPINE 10 MG/1
20 TABLET, ORALLY DISINTEGRATING ORAL AT BEDTIME
Status: DISCONTINUED | OUTPATIENT
Start: 2023-06-09 | End: 2023-06-14

## 2023-06-09 RX ADMIN — OLANZAPINE 20 MG: 10 TABLET, ORALLY DISINTEGRATING ORAL at 20:13

## 2023-06-09 ASSESSMENT — ACTIVITIES OF DAILY LIVING (ADL)
ADLS_ACUITY_SCORE: 40

## 2023-06-09 NOTE — PLAN OF CARE
Problem: Adult Behavioral Health Plan of Care  Goal: Patient-Specific Goal (Individualization)  Description: Patient will attend 75% of group programming.   Patient will eat 75% of meals.   Patient will sleep 6-8 hours per night.   Pt will be agreeable to treatment team recommendations     6/8/2023- Patient may wean to the open unit if behavior is appropriate. Treatment to assess daily.   Outcome: Progressing     Patient has been in bed asleep all shift with regular respirations and position changes noted.  Allowed VS.  Will continue to monitor.  Face to face end of shift report communicated to day shift RN.     Alise Valdez, RN  6/9/2023  6:29 AM

## 2023-06-09 NOTE — PROGRESS NOTES
"  [facility] PSYCHIATRY  -  PROGRESS NOTE     ID   Name: Andre Bustos  MRN#: [id number]     SUBJECTIVE   Prior to interviewing the patient, I met with nursing and reviewed patient's clinical condition. We discussed clinical care both before and after the interview. I have reviewed the patient's clinical course by review of records including previous notes, labs, and vital signs.     Per nursing, the patient had the following behavioral events over the last 24-hours: isolative. Limited social interactions with staff and peers (mainly absent)    On psychiatric interview, Andre is met within his room. He states his mood is \"okay I guess\".  He states not to worry about the reasons he was brought in. He is not able to describe these events again today. When he is directly asked if he feels the olanzapine is helping he states \"I guess so\".  He denies any side effects.  He was made aware of the commitment process and shrugged his shoulder.      MEDICATIONS   Scheduled Meds:    OLANZapine zydis  20 mg Oral At Bedtime     PRN Meds:.acetaminophen, alum & mag hydroxide-simethicone, hydrOXYzine, melatonin, OLANZapine **OR** OLANZapine, polyethylene glycol     ALLERGIES   No Known Allergies     VITALS   Vitals: BP 91/68   Pulse 87   Temp 98.4  F (36.9  C) (Temporal)   Resp 14   Ht 1.778 m (5' 10\")   Wt 51 kg (112 lb 6.4 oz)   SpO2 98%   BMI 16.13 kg/m       MENTAL STATUS EXAM   Appearance:  awake,disheveled  Attitude:  cooperative  Eye Contact:  Poor  Mood:  \"okay I guess\"  Affect: flat  Speech:  mumbles  Psychomotor Behavior:  no evidence of tardive dyskinesia, dystonia, or tics  Thought Process: illogical  Associations:  no loose associations  Thought Content:  no evidence of suicidal ideation or homicidal ideation  Insight:  limited  Judgment:  limited  Oriented to: oriented to person and place only  Attention Span and Concentration:  impaired  Recent and Remote Memory:  impaired  Gait and Station: Normal    "     LABS   No results found for this or any previous visit (from the past 24 hour(s)).      ASSESSMENT   Andre Bustos is a 48 year old male with a past psychiatric history notable for schizoaffective disorder, bipolar type and alcohol use disorder (associated with alcohol withdrawal seizures). Prior inpatient psychiatric hospitalizations, last known in Sept 2021 at [facility]. Previously psychiatric provider, Dr. Contreras from [id number] and most recently Miguelina STREETER CNP in August 2021. Presents to outside emergency department accompanied by law enforcement after being found disorganized twice in the community in same day, walking around naked. Patient was subsequently transferred and accepted for inpatient psychiatric hospitalization at [facility] for further safety and further stabilization     Daily Progress: [facility] picking up commitment. Will increase olanzapine to 20 mg at bedtime.        DIAGNOSTIC FORMULATION   #schizoaffective disorder, depressed type  #alcohol use disorder, remission status unknown     PLAN     Location: Unit 5  Legal Status: Orders Placed This Encounter      Voluntary    Safety Assessment:    Behavioral Orders   Procedures     Code 1 - Restrict to Unit     Routine Programming     As clinically indicated     Status 15     Every 15 minutes.      PTA medications held:   - none (not taking for extended period of time)     PTA medications continued/changed:   -none (nonadherent)     New medications initiated:   -olanzapine 10 mg at bedtime --> increase to 15 mg at bedtime (6/7/23) -> increase olanzapine to 20 mg at bedtime (6/9/23)    Today's Changes:  -increase olanzapine to 20 mg at bedtime     Programming: Patient will be treated in a therapeutic milieu with appropriate individual and group therapies. Education will be provided on diagnoses, medications, and treatments. Encouraged behavioral activation and participation in group programming.      Medical diagnoses:  Per medicine    Disposition: pending clinical course        TREATMENT TEAM CARE PLAN     Progress: Continued symptoms.    Continued Stay Criteria/Rationale: Continued symptoms without sufficient improvement/resolution.    Medical/Physical: See above.    Precautions: See above.     Plan: Continue inpatient care with unit support and medication management.    Rationale for change in precautions or plan: NA due to no change.    Participants: Pedro Conde MD, Nursing, SW, OT.    The patient's care was discussed with the treatment team and chart notes were reviewed.       ATTESTATION    Pedro Conde MD  [facility]   Psychiatry    Video Visit: Patient has given verbal consent for video visit?: Yes  Type of Service: video visit for mental health treatment  Reason for Video Visit: COVID-19 [place]  Originating Site (patient location): [facility]  Distant Site (provider location): Remote Location  Mode of Communication: Video Conference via [employer]  Time of Service: Date: 06/09/2023 , Start: 10:30 end: 11:00

## 2023-06-09 NOTE — PLAN OF CARE
"  Problem: Adult Behavioral Health Plan of Care  Goal: Patient-Specific Goal (Individualization)  Description: Patient will attend 75% of group programming.   Patient will eat 75% of meals.   Patient will sleep 6-8 hours per night.   Pt will be agreeable to treatment team recommendations     6/8/2023- Patient may wean to the open unit if behavior is appropriate. Treatment to assess daily.   Outcome: Progressing  Note: Pt had a flat affect and calm mood. When asked how he was doing, he stated \"alright.\" His thought content and speech were impoverished. Pt denied anxiety, depression, AVH, HI, and SI. Pt isolative and withdrawn to room. He did not wean. Pt forgetful, frequently noted to ask the same question. Pt ate 100% of supper and snack. He was compliant with his scheduled medication.     Face to face end of shift report communicated to oncoming RN.      Problem: Thought Process Alteration  Goal: Optimal Thought Clarity  Outcome: Not Progressing  Note: His thought content and speech were impoverished.      Problem: Suicidal Behavior  Goal: Suicidal Behavior is Absent or Managed  Outcome: Progressing  Note: Pt denied suicidal ideation. He remained free from self harm.      Goal Outcome Evaluation:    Plan of Care Reviewed With: patient      "

## 2023-06-09 NOTE — PLAN OF CARE
Face to face end of shift report received from Alise PÉREZ RN. Rounding completed and patient observed in the MHICU. No requests at this time.     Goal Outcome Evaluation: Patient spent the entire shift in bed. He was unable to participate in nursing assessment but was able to tell this writer he was having a high level of anxiety. He was offered a PRN but declined. He denied all other criteria. He declined to wean. He did not shower.     Face to face end of shift report communicated to oncoming RN.       Problem: Adult Behavioral Health Plan of Care  Goal: Plan of Care Review  Outcome: Progressing     Problem: Thought Process Alteration  Goal: Optimal Thought Clarity  Outcome: Progressing

## 2023-06-10 PROCEDURE — 124N000001 HC R&B MH

## 2023-06-10 PROCEDURE — 99232 SBSQ HOSP IP/OBS MODERATE 35: CPT | Mod: GT | Performed by: PSYCHIATRY & NEUROLOGY

## 2023-06-10 PROCEDURE — 250N000013 HC RX MED GY IP 250 OP 250 PS 637: Performed by: PSYCHIATRY & NEUROLOGY

## 2023-06-10 RX ADMIN — OLANZAPINE 20 MG: 10 TABLET, ORALLY DISINTEGRATING ORAL at 20:05

## 2023-06-10 ASSESSMENT — ACTIVITIES OF DAILY LIVING (ADL)
ADLS_ACUITY_SCORE: 40

## 2023-06-10 NOTE — PROGRESS NOTES
"  [facility] PSYCHIATRY  -  PROGRESS NOTE     ID   Name: Andre Bustos  MRN#: [id number]     SUBJECTIVE   Prior to interviewing the patient, I met with nursing and reviewed patient's clinical condition. We discussed clinical care both before and after the interview. I have reviewed the patient's clinical course by review of records including previous notes, labs, and vital signs.     Per nursing, the patient had the following behavioral events over the last 24-hours: isolative to room. When offered to come out of ICU, he declines.     On psychiatric interview, Andre is met within his room. He states he is \"alright\". He states \"maybe taking those pills again help\" referring to the olanzapine. Tolerated the olanzapine increase to 20 mg at bedtime - no reported side effects , denies feeling stiff or rigid. States he is eating again and feels \"stronger\". He salazar snot report \"high anxiety\". When asked if he wouldlike to come out of his room and interact with peers and staff he states \"maybe Monday\".  He denies SI. He denies any physical pain.        MEDICATIONS   Scheduled Meds:    OLANZapine zydis  20 mg Oral At Bedtime     PRN Meds:.acetaminophen, alum & mag hydroxide-simethicone, hydrOXYzine, melatonin, OLANZapine **OR** OLANZapine, polyethylene glycol     ALLERGIES   No Known Allergies     VITALS   Vitals: /72 (BP Location: Left arm)   Pulse 96   Temp 97.6  F (36.4  C) (Tympanic)   Resp 18   Ht 1.778 m (5' 10\")   Wt 51 kg (112 lb 6.4 oz)   SpO2 96%   BMI 16.13 kg/m       MENTAL STATUS EXAM   Appearance:  awake,disheveled  Attitude:  cooperative  Eye Contact:  Poor  Mood:  \"alright\"  Affect: flat  Speech:  mumbles  Psychomotor Behavior:  no evidence of tardive dyskinesia, dystonia, or tics  Thought Process: illogical  Associations:  no loose associations  Thought Content:  no evidence of suicidal ideation or homicidal ideation  Insight:  limited  Judgment:  limited  Oriented to: oriented to person " and place only  Attention Span and Concentration:  impaired  Recent and Remote Memory:  impaired  Gait and Station: Normal        LABS   No results found for this or any previous visit (from the past 24 hour(s)).      ASSESSMENT   Andre Bustos is a 48 year old male with a past psychiatric history notable for schizoaffective disorder, bipolar type and alcohol use disorder (associated with alcohol withdrawal seizures). Prior inpatient psychiatric hospitalizations, last known in Sept 2021 at [facility]. Previously psychiatric provider, Dr. Contreras from [id number] and most recently Miguelina STREETER CNP in August 2021. Presents to outside emergency department accompanied by law enforcement after being found disorganized twice in the community in same day, walking around naked. Patient was subsequently transferred and accepted for inpatient psychiatric hospitalization at [facility] for further safety and further stabilization     Daily Progress: county picking up commitment. Continue olanzapine at 20 mg at bedtime. Encourage him to come out of MHICU.        DIAGNOSTIC FORMULATION   #schizoaffective disorder, depressed type  #alcohol use disorder, remission status unknown     PLAN     Location: Unit 5  Legal Status: Orders Placed This Encounter      Voluntary    Safety Assessment:    Behavioral Orders   Procedures     Code 1 - Restrict to Unit     Routine Programming     As clinically indicated     Status 15     Every 15 minutes.      PTA medications held:   - none (not taking for extended period of time)     PTA medications continued/changed:   -none (nonadherent)     New medications initiated:   -olanzapine 10 mg at bedtime --> increase to 15 mg at bedtime (6/7/23) -> increase olanzapine to 20 mg at bedtime (6/9/23)    Today's Changes:  -continue lanzapine to 20 mg at bedtime     Programming: Patient will be treated in a therapeutic milieu with appropriate individual and group  therapies. Education will be provided on diagnoses, medications, and treatments. Encouraged behavioral activation and participation in group programming.     Medical diagnoses:  Per medicine    Disposition: pending clinical course        TREATMENT TEAM CARE PLAN     Progress: Continued symptoms.    Continued Stay Criteria/Rationale: Continued symptoms without sufficient improvement/resolution.    Medical/Physical: See above.    Precautions: See above.     Plan: Continue inpatient care with unit support and medication management.    Rationale for change in precautions or plan: NA due to no change.    Participants: Pedro Conde MD, Nursing, SW, OT.    The patient's care was discussed with the treatment team and chart notes were reviewed.       ATTESTATION    Pedro Conde MD  [facility]   Psychiatry    Video Visit: Patient has given verbal consent for video visit?: Yes  Type of Service: video visit for mental health treatment  Reason for Video Visit: COVID-19 [place]  Originating Site (patient location): [facility]  Distant Site (provider location): Remote Location  Mode of Communication: Video Conference via [employer]  Time of Service: Date: 06/10/2023 , Start: 08:30 end: 09:00

## 2023-06-10 NOTE — PLAN OF CARE
Face to face end of shift report received from Day KENT RN. Rounding completed and patient observed lying in bed awake. Denied needing anything at this time.     Goal Outcome Evaluation: Patient did not appear to get out of bed. He is malodorous but he declined to shower when prompted. His affect is flat. He orders very small meals but did ask for extras for lunch. Patient denied pain. It is hard to understand him and he is very delayed and quiet.     Face to face end of shift report communicated to oncoming RN.       Problem: Adult Behavioral Health Plan of Care  Goal: Patient-Specific Goal (Individualization)  Description: Patient will attend 75% of group programming.   Patient will eat 75% of meals.   Patient will sleep 6-8 hours per night.   Pt will be agreeable to treatment team recommendations     6/9/2023- Patient may wean to the open unit if behavior is appropriate. Treatment to assess daily.   Outcome: Not Progressing     Problem: Thought Process Alteration  Goal: Optimal Thought Clarity  Outcome: Not Progressing

## 2023-06-10 NOTE — PLAN OF CARE
"Face to face end of shift report communicated to oncoming shift.     Jennifer Silva RN  6/9/2023  11:04 PM       Problem: Adult Behavioral Health Plan of Care  Goal: Patient-Specific Goal (Individualization)  Description: Patient will attend 75% of group programming.   Patient will eat 75% of meals.   Patient will sleep 6-8 hours per night.   Pt will be agreeable to treatment team recommendations     6/9/2023- Patient may wean to the open unit if behavior is appropriate. Treatment to assess daily.   Outcome: Not Progressing  Note: Patient remains in bed this shift.  Eats 100% of dinner and snack.   Compliant with HS medications.  Patient reports to \"feeling stronger today\"  Patient paranoid of others and surroundings, refusing to wean to open unit due to this.  Patient lets needs be known.     Goal Outcome Evaluation:                        "

## 2023-06-10 NOTE — PLAN OF CARE
"Face to face end of shift report communicated to oncoming shift.     Jennifer Silva RN  6/10/2023  11:08 PM    Problem: Adult Behavioral Health Plan of Care  Goal: Patient-Specific Goal (Individualization)  Description: Patient will attend 75% of group programming.   Patient will eat 75% of meals.   Patient will sleep 6-8 hours per night.   Pt will be agreeable to treatment team recommendations     6/9/2023- Patient may wean to the open unit if behavior is appropriate. Treatment to assess daily.   Outcome: Not Progressing  Note: Patient remains in bed this shift.  Patient eats 100% of dinner.  Patient asked how he was doing, if he needed anything or there was anything this writer could do for him.  Patient repeatedly asks for orange juice.      Asked patient if he would like to wean,  \"maybe tomorrow\"  \"you mean like [place] there [place] the [place]\"      Encouraged patient to shower this shift, patient declines.     Patient hesitant to take HS medication \"ya know I really don't think I need this, I don't think it's helping\" Patient reluctantly takes medication, encouraged to talk to provider tomorrow regarding medication.       Goal Outcome Evaluation:                        "

## 2023-06-10 NOTE — PLAN OF CARE
Problem: Adult Behavioral Health Plan of Care  Goal: Patient-Specific Goal (Individualization)  Description: Patient will attend 75% of group programming.   Patient will eat 75% of meals.   Patient will sleep 6-8 hours per night.   Pt will be agreeable to treatment team recommendations     6/9/2023- Patient may wean to the open unit if behavior is appropriate. Treatment to assess daily.   Outcome: Progressing  Note: Face to face end of shift report received from Jennifer BARAHONA RN. Rounding completed. Patient observed resting in bed.     Day Paige RN  6/10/2023  12:28 AM     Pt appeared to be sleeping 7 hours tonight.     Face to face end of shift report communicated to oncoming RN.      Problem: Thought Process Alteration  Goal: Optimal Thought Clarity  Outcome: Progressing  Note: LUIZA as pt was sleeping.      Problem: Suicidal Behavior  Goal: Suicidal Behavior is Absent or Managed  Outcome: Progressing  Note: Pt remained free from self harm.

## 2023-06-11 PROCEDURE — 124N000001 HC R&B MH

## 2023-06-11 PROCEDURE — 250N000013 HC RX MED GY IP 250 OP 250 PS 637: Performed by: PSYCHIATRY & NEUROLOGY

## 2023-06-11 RX ADMIN — OLANZAPINE 20 MG: 10 TABLET, ORALLY DISINTEGRATING ORAL at 20:18

## 2023-06-11 ASSESSMENT — ACTIVITIES OF DAILY LIVING (ADL)
ADLS_ACUITY_SCORE: 40

## 2023-06-11 NOTE — PLAN OF CARE
Problem: Suicidal Behavior  Goal: Suicidal Behavior is Absent or Managed  Outcome: Progressing     Problem: Thought Process Alteration  Goal: Optimal Thought Clarity  Outcome: Progressing     Problem: Adult Behavioral Health Plan of Care  Goal: Patient-Specific Goal (Individualization)  Description: Patient will attend 75% of group programming.   Patient will eat 75% of meals.   Patient will sleep 6-8 hours per night.   Pt will be agreeable to treatment team recommendations     6/9/2023- Patient may wean to the open unit if behavior is appropriate. Treatment to assess daily.   Outcome: Progressing     Face to Face report received from DAVID Rodriguez.  Rounding completed. Patient observed in bed with eyes closed appearing to sleep for 7 hours with even & unlabored respirations noted. 15 minute and PRN checks all night. Patient was able to make needs known.  No complaints offered. Patient was free of falls and self-harm during NOC.  Will continue to monitor.    Face to face end of shift report communicated to oncoming day shft RN.     Jerri Angeles RN  6/11/2023  2:04 AM

## 2023-06-11 NOTE — PLAN OF CARE
"Face to face end of shift report received from . RN. Rounding completed and patient observed lying in bed with eyes closed, breathing regular and unlabored.     Goal Outcome Evaluation: Patient has stayed in bed almost the entire day shift. Staff prompted him to take a shower and he \"no, I'll take one tomorrow.\" Staff re-approached him later explaining they had to change his bedding. They showed him the supplies in the shower and he begrudgingly got in the shower.  Patient ordered very little food on his trays but then asked for seconds and received. Patient denied needing any PRNs and denied pain.     15:15 Update: Face to face end of shift report communicated to the charge nurse. This writer will continue to provide nursing services for patient throughout the next shift. Rounding completed and patient observed.    21:00 Update: Patient hesitated to take his scheduled 20mg of Zyprexa. He said he didn't want to take it. He asked \"what will this do to me.\" he declined to take it and then went to hand the cup back to this writer and then quickly threw the pills in his mouth. He was prompted to come out to the open unit but he declined. He is slow to respond and appears to have trouble communicating his needs.     Face to face end of shift report communicated to oncoming RN.       Problem: Adult Behavioral Health Plan of Care  Goal: Patient-Specific Goal (Individualization)  Description: Patient will attend 75% of group programming.   Patient will eat 75% of meals.   Patient will sleep 6-8 hours per night.   Pt will be agreeable to treatment team recommendations     6/9/2023- Patient may wean to the open unit if behavior is appropriate. Treatment to assess daily.   Outcome: Not Progressing     Problem: Thought Process Alteration  Goal: Optimal Thought Clarity  Outcome: Not Progressing                         "

## 2023-06-12 PROCEDURE — 99232 SBSQ HOSP IP/OBS MODERATE 35: CPT | Mod: GT | Performed by: PSYCHIATRY & NEUROLOGY

## 2023-06-12 PROCEDURE — 250N000013 HC RX MED GY IP 250 OP 250 PS 637: Performed by: PSYCHIATRY & NEUROLOGY

## 2023-06-12 PROCEDURE — 124N000001 HC R&B MH

## 2023-06-12 RX ADMIN — OLANZAPINE 20 MG: 10 TABLET, ORALLY DISINTEGRATING ORAL at 20:23

## 2023-06-12 ASSESSMENT — ACTIVITIES OF DAILY LIVING (ADL)
ADLS_ACUITY_SCORE: 40
ADLS_ACUITY_SCORE: 40
HYGIENE/GROOMING: INDEPENDENT
ADLS_ACUITY_SCORE: 40
DRESS: SCRUBS (BEHAVIORAL HEALTH)
LAUNDRY: UNABLE TO COMPLETE
DRESS: INDEPENDENT
ADLS_ACUITY_SCORE: 40
ADLS_ACUITY_SCORE: 40
ORAL_HYGIENE: INDEPENDENT
HYGIENE/GROOMING: INDEPENDENT
ADLS_ACUITY_SCORE: 40
ORAL_HYGIENE: INDEPENDENT
ADLS_ACUITY_SCORE: 40
ADLS_ACUITY_SCORE: 40

## 2023-06-12 NOTE — PLAN OF CARE
"  Problem: Adult Behavioral Health Plan of Care  Goal: Patient-Specific Goal (Individualization)  Description: Patient will attend 75% of group programming.   Patient will eat 75% of meals.   Patient will sleep 6-8 hours per night.   Pt will be agreeable to treatment team recommendations     6/12/2023- Patient may wean to the open unit if behavior is appropriate. Treatment to assess daily.   Outcome: Progressing     Problem: Thought Process Alteration  Goal: Optimal Thought Clarity  Outcome: Progressing     Problem: Suicidal Behavior  Goal: Suicidal Behavior is Absent or Managed  Outcome: Progressing   Goal Outcome Evaluation:         Pt. In bed this morning, ate 50% of breakfast, slept 7 hours last night, is paranoid with questions, stating \"I want to stay back here today\", is able to make needs be known, instructed Pt. That he can wean to the open unit, Pt. Refusing at this time, has poor eye contact, will continue to monitor progress.  1242-  Pt. Weaned to open unit when housekeeping cleaned his room.  Went right back to room after around 5 minutes of being on the open unit.  Mumbling and making paranoid statements about people talking about him, instructed pt.that no one is talking about him and that he is safe.      Face to face end of shift report will be communicated to oncoming afternoon shift RN.     Lashae Zuñiga RN  6/12/2023  10:44 AM                     "

## 2023-06-12 NOTE — PROGRESS NOTES
"  [facility] PSYCHIATRY  -  PROGRESS NOTE     ID   Name: Andre Bustos  MRN#: [id number]     SUBJECTIVE   Prior to interviewing the patient, I met with nursing and reviewed patient's clinical condition. We discussed clinical care both before and after the interview. I have reviewed the patient's clinical course by review of records including previous notes, labs, and vital signs.     Per nursing, the patient had the following behavioral events over the last 24-hours: isolative to room. [facility]     On psychiatric interview, Andre is met within his room. He states he is \"Fine\". He stated over the weekend that he was willing to come out to the open unit on Monday. He was informed that it was Monday and that it would be helpful for his mental health to begin transitioning more to the open unit. He gave multiple excuses as to why he can't. He was encouraged to eat lunch today and then spend time in open unit this afternoon. He remains heavily guarded. He was somewhat annoyed with request to go to the open unit and socialize.  He reports no issues with the medication. No issues with his appetite or sleep. Denies any physical complaints.        MEDICATIONS   Scheduled Meds:    OLANZapine zydis  20 mg Oral At Bedtime     PRN Meds:.acetaminophen, alum & mag hydroxide-simethicone, hydrOXYzine, melatonin, OLANZapine **OR** OLANZapine, polyethylene glycol     ALLERGIES   No Known Allergies     VITALS   Vitals: BP 96/66   Pulse 97   Temp 99.3  F (37.4  C) (Tympanic)   Resp 16   Ht 1.778 m (5' 10\")   Wt 51 kg (112 lb 6.4 oz)   SpO2 97%   BMI 16.13 kg/m       MENTAL STATUS EXAM   Appearance:  awake,disheveled  Attitude:  cooperative  Eye Contact:  Poor  Mood:  \"fine\"  Affect: slightly irritable  Speech:  mumbles  Psychomotor Behavior:  no evidence of tardive dyskinesia, dystonia, or tics  Thought Process: illogical  Associations:  no loose associations  Thought Content:  no " evidence of suicidal ideation or homicidal ideation  Insight:  limited  Judgment:  limited  Oriented to: oriented to person and place only  Attention Span and Concentration:  impaired  Recent and Remote Memory:  impaired  Gait and Station: Normal        LABS   No results found for this or any previous visit (from the past 24 hour(s)).      ASSESSMENT   Andre Bustos is a 48 year old male with a past psychiatric history notable for schizoaffective disorder, bipolar type and alcohol use disorder (associated with alcohol withdrawal seizures). Prior inpatient psychiatric hospitalizations, last known in Sept 2021 at [facility]. Previously psychiatric provider, Dr. Contreras from [id number] and most recently Miguelina STREETER CNP in August 2021. Presents to outside emergency department accompanied by law enforcement after being found disorganized twice in the community in same day, walking around naked. Patient was subsequently transferred and accepted for inpatient psychiatric hospitalization at [facility] for further safety and further stabilization     Daily Progress: resistant to transitioning out of MHICU despite encouragement, mostly isolative to his room. Suspect continued level of paranaoia. Remains guarded       DIAGNOSTIC FORMULATION   #schizoaffective disorder, depressed type  #alcohol use disorder, remission status unknown     PLAN     Location: Unit 5  Legal Status: Orders Placed This Encounter      Voluntary    Safety Assessment:    Behavioral Orders   Procedures     Code 1 - Restrict to Unit     Routine Programming     As clinically indicated     Status 15     Every 15 minutes.      PTA medications held:   - none (not taking for extended period of time)     PTA medications continued/changed:   -none (nonadherent)     New medications initiated:   -olanzapine 10 mg at bedtime --> increase to 15 mg at bedtime (6/7/23) -> increase olanzapine to 20 mg at bedtime  (6/9/23)    Today's Changes:  -continue olanzapine to 20 mg at bedtime     Programming: Patient will be treated in a therapeutic milieu with appropriate individual and group therapies. Education will be provided on diagnoses, medications, and treatments. Encouraged behavioral activation and participation in group programming.     Medical diagnoses:  Per medicine    Disposition: pending clinical course        TREATMENT TEAM CARE PLAN     Progress: Continued symptoms.    Continued Stay Criteria/Rationale: Continued symptoms without sufficient improvement/resolution.    Medical/Physical: See above.    Precautions: See above.     Plan: Continue inpatient care with unit support and medication management.    Rationale for change in precautions or plan: NA due to no change.    Participants: Pedro Conde MD, Nursing, SW, OT.    The patient's care was discussed with the treatment team and chart notes were reviewed.       ATTESTATION    Pedro Conde MD  [facility]   Psychiatry    Video Visit: Patient has given verbal consent for video visit?: Yes  Type of Service: video visit for mental health treatment  Reason for Video Visit: COVID-19 [place]  Originating Site (patient location): [facility], [place]  Distant Site (provider location): Remote Location  Mode of Communication: Video Conference via [employer]  Time of Service: Date: 06/12/2023 , Start: 12:30 end: 13:00

## 2023-06-12 NOTE — PROGRESS NOTES
"CLINICAL NUTRITION SERVICES  -  REASSESSMENT NOTE    Andre Bustos :     48 yom admitted for suicidal ideation. Medical/psych hx includes schizoaffective disorder, bipolar type and alcohol use disorder. Intake and appetite are improving. No new weight documented to assess since last review    Diet Order: Regular, double portions  Intake: 15 meals with % intake. Mostly 100%    Height: 5' 10\"  Weight: 112 lbs 6.4 oz  Body mass index is 16.13 kg/m .  Weight Status:  Underweight BMI <18.5  Weight History:   Wt Readings from Last 10 Encounters:   06/05/23 51 kg (112 lb 6.4 oz)   08/18/21 68.9 kg (151 lb 12.8 oz)       Weight used to calculate needs: 68.6kg ibw  Estimated Energy Needs: 6313-4855 kcals (30-35 Kcal/Kg)   Estimated Protein Needs:  grams protein (1-1.5 g pro/Kg)      Malnutrition Diagnosis: Severe malnutrition  In Context of:  Environmental or social circumstances     NUTRITION DIAGNOSIS:  Malnutrition related to inadequate energy/protein intake as evidenced by weight loss and loss of muscle and subcutaneous fat.     NUTRITION RECOMMENDATIONS  - Encourage intake with meals/snacks     MONITORING AND EVALUATION:  RD will monitor intake, weight, labs         "

## 2023-06-12 NOTE — PLAN OF CARE
Problem: Suicidal Behavior  Goal: Suicidal Behavior is Absent or Managed  Outcome: Progressing     Problem: Thought Process Alteration  Goal: Optimal Thought Clarity  Outcome: Progressing     Problem: Adult Behavioral Health Plan of Care  Goal: Patient-Specific Goal (Individualization)  Description: Patient will attend 75% of group programming.   Patient will eat 75% of meals.   Patient will sleep 6-8 hours per night.   Pt will be agreeable to treatment team recommendations     6/9/2023- Patient may wean to the open unit if behavior is appropriate. Treatment to assess daily.   Outcome: Progressing       Face to Face report received from DAVID Clement.  Rounding completed. Patient observed in bed with eyes closed appearing to sleep for 7 hours with even & unlabored respirations noted. 15 minute and PRN checks all night. Patient was able to make needs known.  No complaints offered. Patient was free of falls and self-harm during NOC.  Will continue to monitor.    Face to face end of shift report communicated to oncoming day shift RN.     Jerri Angeles RN  6/12/2023  4:49 AM

## 2023-06-13 PROCEDURE — 124N000001 HC R&B MH

## 2023-06-13 PROCEDURE — 99232 SBSQ HOSP IP/OBS MODERATE 35: CPT | Mod: GT | Performed by: PSYCHIATRY & NEUROLOGY

## 2023-06-13 PROCEDURE — 250N000013 HC RX MED GY IP 250 OP 250 PS 637: Performed by: PSYCHIATRY & NEUROLOGY

## 2023-06-13 RX ADMIN — OLANZAPINE 20 MG: 10 TABLET, ORALLY DISINTEGRATING ORAL at 20:03

## 2023-06-13 ASSESSMENT — ACTIVITIES OF DAILY LIVING (ADL)
ADLS_ACUITY_SCORE: 40
HYGIENE/GROOMING: INDEPENDENT
LAUNDRY: UNABLE TO COMPLETE
ADLS_ACUITY_SCORE: 40
DRESS: SCRUBS (BEHAVIORAL HEALTH);INDEPENDENT
ORAL_HYGIENE: INDEPENDENT
ADLS_ACUITY_SCORE: 40

## 2023-06-13 NOTE — PLAN OF CARE
"  Problem: Adult Behavioral Health Plan of Care  Goal: Patient-Specific Goal (Individualization)  Description: Patient will attend 75% of group programming.   Patient will eat 75% of meals.   Patient will sleep 6-8 hours per night.   Pt will be agreeable to treatment team recommendations     6/12/2023- Patient may wean to the open unit if behavior is appropriate. Treatment to assess daily.   Outcome: Progressing   Goal Outcome Evaluation:    Plan of Care Reviewed With: patient      Patient is Alert, able to make needs known. VSS b/p soft. Asymptomatic.  Afebrile. Assessment and vitals as charted. Denies pain.   Patient has been laying in bed most of shift. Flat, blunt affect. Endorses anxiety and depression but denies any pharmacological intervention. Patient offers limited information. Answers partial assessment questions and states, \"please let me be for a little bit.\" Attempted to complete full nursing assessment, but patient ask staff to \"come back later.\" Often has sheet, or blanket over his head. Does not make eye contact. Encouraged to come out to open unit and wean. Patient states he will not come out of his room. He does not \"socialize\" and does not \"see the point in starting now.\" Appears paranoid, suspicious of staff. Steady gait. No falls. Ate 100% of breakfast and 75% of lunch.     Continuing care for patient into afternoon shift.  Patient remains in bed. Still refusing to come out to open unit. Has no immediate request at this time.     2000: Patient took scheduled medication at bed time. Continues to endorse anxiety. States he is doing \"okay.\" Does not want PRN medication for anxiety. Staff [facility] or go to group. Patient continues to refuse to wean. States \"maybe tomorrow.\" Ate 100% of dinner.   "

## 2023-06-13 NOTE — PROGRESS NOTES
The patient had his Preliminary Hearing this morning. The  stated they are going to review all records and have an order out by the end of the day.

## 2023-06-13 NOTE — PLAN OF CARE
"Face to face end of shift report received from Lashae BRAGG RN. Rounding completed. Patient observed in bed, awake.     Pt has been calm, cooperative this shift. He is withdrawn, isolative to his room. He refuses to come out of his room or wean to the open unit. Pt denied any SI/HI and AH/VH. Denied pain. Speech is still soft/quiet and difficult to understand. Questioned pt if he would like to shower this evening and he stated \"No I think I'll just stay in bed for tonight.\". Pt does not offer much to conversation or nursing assessment. Pt remained in bed for the majority of the shift. Brought back pt HS medications. Pt irritable stating \"I'm stuck here for life. Look at my hair I need a hair cut, but no I'm stuck in here for forever\". Attempted to inform pt that he wouldn't be here for forever, but he would not listen to this writer. He took HS meds without any issue. Pt requests \"Can I eat my breakfast in here. I don't want to go out to the lounge with those other people.\" He is able to make his needs known. Frequent rounding.     Problem: Adult Behavioral Health Plan of Care  Goal: Patient-Specific Goal (Individualization)  Description: Patient will attend 75% of group programming.   Patient will eat 75% of meals.   Patient will sleep 6-8 hours per night.   Pt will be agreeable to treatment team recommendations     6/12/2023- Patient may wean to the open unit if behavior is appropriate. Treatment to assess daily.   Outcome: Progressing     Problem: Thought Process Alteration  Goal: Optimal Thought Clarity  Outcome: Progressing     Problem: Suicidal Behavior  Goal: Suicidal Behavior is Absent or Managed  Outcome: Progressing     Loreto Ludwig RN  6/12/2023  7:42 PM    "

## 2023-06-13 NOTE — PROGRESS NOTES
Patient received Notice for rescheduling time for commitment hearing.     The commitment hearing has been rescheduled for 6/20/2023 @ 11 am.       The patient received the Preliminary Findings paperwork. The patient is confined until his Commitment Hearing 6/20/2023 @ 11:00 am.

## 2023-06-13 NOTE — PLAN OF CARE
Problem: Suicidal Behavior  Goal: Suicidal Behavior is Absent or Managed  Outcome: Progressing     Problem: Thought Process Alteration  Goal: Optimal Thought Clarity  Outcome: Progressing     Problem: Adult Behavioral Health Plan of Care  Goal: Patient-Specific Goal (Individualization)  Description: Patient will attend 75% of group programming.   Patient will eat 75% of meals.   Patient will sleep 6-8 hours per night.   Pt will be agreeable to treatment team recommendations     6/12/2023- Patient may wean to the open unit if behavior is appropriate. Treatment to assess daily.   Outcome: Progressing     Face to Face report received from DAVID Dangelo.  Rounding completed. Patient observed in bed with eyes closed appearing to sleep for 7 hours with even & unlabored respirations noted. 15 minute and PRN checks all night. Patient was able to make needs known.  No complaints offered. Patient was free of falls and self-harm during NOC.  Will continue to monitor.    Face to face end of shift report [facility].     Jerri Angeles RN  6/13/2023  4:49 AM

## 2023-06-13 NOTE — PROGRESS NOTES
"  [facility]  -  PROGRESS NOTE     ID   Name: Andre Bustos  MRN#: [id number]     SUBJECTIVE   Prior to interviewing the patient, I met with nursing and reviewed patient's clinical condition. We discussed clinical care both before and after the interview. I have reviewed the patient's clinical course by review of records including previous notes, labs, and vital signs.     Per nursing, the patient had the following behavioral events over the last 24-hours: isolative to room. When offered to come out of MHICU, he declines.     On psychiatric interview, Andre is met within his room. He is aware he has hearing this AM. He remains guarded. Suspicious and paranoid. noncooperative with interview. Electing to put covers over his head. Limited interaction with his  and with court , answers a few questions but minimally.         MEDICATIONS   Scheduled Meds:    OLANZapine zydis  20 mg Oral At Bedtime     PRN Meds:.acetaminophen, alum & mag hydroxide-simethicone, hydrOXYzine, melatonin, OLANZapine **OR** OLANZapine, polyethylene glycol     ALLERGIES   No Known Allergies     VITALS   Vitals: BP 96/66   Pulse 97   Temp 99.3  F (37.4  C) (Tympanic)   Resp 16   Ht 1.778 m (5' 10\")   Wt 51 kg (112 lb 6.4 oz)   SpO2 97%   BMI 16.13 kg/m       MENTAL STATUS EXAM   Appearance:  awake,disheveled  Attitude:  cooperative  Eye Contact:  Poor  Mood:  \"fine\"  Affect: slightly irritable  Speech:  mumbles  Psychomotor Behavior:  no evidence of tardive dyskinesia, dystonia, or tics  Thought Process: illogical  Associations:  no loose associations  Thought Content:  no evidence of suicidal ideation or homicidal ideation  Insight:  limited  Judgment:  limited  Oriented to: oriented to person and place only  Attention Span and Concentration:  impaired  Recent and Remote Memory:  impaired  Gait and Station: Normal        LABS   No results found for this or any previous visit (from the past 24 hour(s)).      ASSESSMENT "   Andre Bustos is a 48 year old male with a past psychiatric history notable for schizoaffective disorder, bipolar type and alcohol use disorder (associated with alcohol withdrawal seizures). Prior inpatient psychiatric hospitalizations, last known in Sept 2021 at [facility]. Previously psychiatric provider, Dr. Contreras from [id number] and most recently Miguelina STREETER CNP in August 2021. Presents to outside emergency department accompanied by law enforcement after being found disorganized twice in the community in same day, walking around naked. Patient was subsequently transferred and accepted for inpatient psychiatric hospitalization at [facility] for further safety and further stabilization     Daily Progress: resistant to transitioning out of MHICU despite encouragement, mostly isolative to his room. Suspect continued level of paranaoia. Remains guarded . He will having court today, anticipate commitment        DIAGNOSTIC FORMULATION   #schizoaffective disorder, depressed type  #alcohol use disorder, remission status unknown     PLAN     Location: Unit 5  Legal Status: Orders Placed This Encounter      Voluntary    Safety Assessment:    Behavioral Orders   Procedures     Code 1 - Restrict to Unit     Routine Programming     As clinically indicated     Status 15     Every 15 minutes.      PTA medications held:   - none (not taking for extended period of time)     PTA medications continued/changed:   -none (nonadherent)     New medications initiated:   -olanzapine 10 mg at bedtime --> increase to 15 mg at bedtime (6/7/23) -> increase olanzapine to 20 mg at bedtime (6/9/23)    Today's Changes:  -continue olanzapine to 20 mg at bedtime , consider increasing in coming days    Programming: Patient will be treated in a therapeutic milieu with appropriate individual and group therapies. Education will be provided on diagnoses, medications, and treatments. Encouraged  behavioral activation and participation in group programming.     Medical diagnoses:  Per medicine    Disposition: pending clinical course        TREATMENT TEAM CARE PLAN     Progress: Continued symptoms.    Continued Stay Criteria/Rationale: Continued symptoms without sufficient improvement/resolution.    Medical/Physical: See above.    Precautions: See above.     Plan: Continue inpatient care with unit support and medication management.    Rationale for change in precautions or plan: NA due to no change.    Participants: Pedro Conde MD, Nursing, SW, OT.    The patient's care was discussed with the treatment team and chart notes were reviewed.       ATTESTATION    Pedro Conde MD  [facility]   Psychiatry    Video Visit: Patient has given verbal consent for video visit?: Yes  Type of Service: video visit for mental health treatment  Reason for Video Visit: COVID-19 [place]  Originating Site (patient location): [facility]  Distant Site (provider location): Remote Location  Mode of Communication: Video Conference via [employer]  Time of Service: Date: 06/13/2023 , Start: 09:30 end: 10:00

## 2023-06-14 PROCEDURE — 250N000013 HC RX MED GY IP 250 OP 250 PS 637: Performed by: PSYCHIATRY & NEUROLOGY

## 2023-06-14 PROCEDURE — 124N000001 HC R&B MH

## 2023-06-14 PROCEDURE — 99232 SBSQ HOSP IP/OBS MODERATE 35: CPT | Mod: GT | Performed by: PSYCHIATRY & NEUROLOGY

## 2023-06-14 RX ADMIN — OLANZAPINE 25 MG: 10 TABLET, ORALLY DISINTEGRATING ORAL at 20:04

## 2023-06-14 ASSESSMENT — ACTIVITIES OF DAILY LIVING (ADL)
ADLS_ACUITY_SCORE: 40
HYGIENE/GROOMING: INDEPENDENT
ORAL_HYGIENE: INDEPENDENT
LAUNDRY: UNABLE TO COMPLETE
ADLS_ACUITY_SCORE: 40
ADLS_ACUITY_SCORE: 40
LAUNDRY: UNABLE TO COMPLETE
ADLS_ACUITY_SCORE: 40
HYGIENE/GROOMING: INDEPENDENT
ADLS_ACUITY_SCORE: 40
ORAL_HYGIENE: INDEPENDENT
ADLS_ACUITY_SCORE: 40
DRESS: SCRUBS (BEHAVIORAL HEALTH);INDEPENDENT
ADLS_ACUITY_SCORE: 40
DRESS: SCRUBS (BEHAVIORAL HEALTH);INDEPENDENT
ADLS_ACUITY_SCORE: 40
ADLS_ACUITY_SCORE: 40

## 2023-06-14 NOTE — PROGRESS NOTES
1:1 time with the patient.  No changes made to the discharge plan at this time.   See the AVS for follow up appointments and recommendations.

## 2023-06-14 NOTE — PLAN OF CARE
"    Problem: Adult Behavioral Health Plan of Care  Goal: Patient-Specific Goal (Individualization)  Description: Patient will attend 75% of group programming.   Patient will eat 75% of meals.   Patient will sleep 6-8 hours per night.   Pt will be agreeable to treatment team recommendations     6/12/2023- Patient may wean to the open unit if behavior is appropriate. Treatment to assess daily.   Outcome: Progressing    Patient is Alert, able to make needs known. VSS, soft B/P of 98/69, He is asymptomatic. Afebrile. Assessment and vitals as charted. Denies pain. Patient remains having a flat, blunt affect.  Denies SI, SIB, HI, hallucinations. Endorses anxiety but denies pharmacological intervention. Encouraged to shower. Pt declined. Pt encouraged to wean out of room. He denies. States he does not want to leave room. Does not want people to \"laugh\" at him. Has some paranoia. He does complete some assessment questions, otherwise dismisses writer and states to \"come back later.\" Will cover head during conversation at times. Avoids eye contact. Steady gait, no falls. Ate 100% of breakfast and lunch.   1400: Attempted again to have patient wean out to get drinks and fluids. Continues to refuses.      Face to face report given with opportunity to observe patient.    Report given to PM, RN.     Jodi Matias RN   6/14/2023  3:07 PM      "

## 2023-06-14 NOTE — PROGRESS NOTES
"  [facility]  -  PROGRESS NOTE     ID   Name: Andre Bustos  MRN#: [id number]     SUBJECTIVE   Prior to interviewing the patient, I met with nursing and reviewed patient's clinical condition. We discussed clinical care both before and after the interview. I have reviewed the patient's clinical course by review of records including previous notes, labs, and vital signs.     Per nursing, the patient had the following behavioral events over the last 24-hours: continued paranoid. Expresses he does not want people to \"laugh\" at him.     On psychiatric interview, Andre is met within his room. Together we were able to go into the milieu today and conduct the interview. It became clear that he became very overwhelmed by this, looking all around the room, requesting that he go back to his room and repeatedly stated \"maybe tomorrow\".  The milieu was empty at the time.  He states \"maybe the pills are helping\" but he can't identify with what.  He is aware he has a final hearing next Tuesday.       MEDICATIONS   Scheduled Meds:    OLANZapine zydis  25 mg Oral At Bedtime     PRN Meds:.acetaminophen, alum & mag hydroxide-simethicone, hydrOXYzine, melatonin, OLANZapine **OR** OLANZapine, polyethylene glycol     ALLERGIES   No Known Allergies     VITALS   Vitals: BP 98/69   Pulse 95   Temp 97.9  F (36.6  C) (Tympanic)   Resp 16   Ht 1.778 m (5' 10\")   Wt 51 kg (112 lb 6.4 oz)   SpO2 98%   BMI 16.13 kg/m       MENTAL STATUS EXAM   Appearance:  awake,disheveled  Attitude:  cooperative  Eye Contact:  Poor  Mood:  \"alright\"  Affect: anxious  Speech:  mumbles  Psychomotor Behavior:  no evidence of tardive dyskinesia, dystonia, or tics  Thought Process: illogical  Associations:  no loose associations  Thought Content:  no evidence of suicidal ideation or homicidal ideation, paranoia prominent  Insight:  limited  Judgment:  limited  Oriented to: oriented to person and place only  Attention Span and Concentration:  " impaired  Recent and Remote Memory:  impaired  Gait and Station: Normal        LABS   No results found for this or any previous visit (from the past 24 hour(s)).      ASSESSMENT   Andre Bustos is a 48 year old male with a past psychiatric history notable for schizoaffective disorder, bipolar type and alcohol use disorder (associated with alcohol withdrawal seizures). Prior inpatient psychiatric hospitalizations, last known in Sept 2021 at [facility]. Previously psychiatric provider, Dr. Contreras from [id number] and most recently Miguelina STREETER CNP in August 2021. Presents to outside emergency department accompanied by law enforcement after being found disorganized twice in the community in same day, walking around naked. Patient was subsequently transferred and accepted for inpatient psychiatric hospitalization at [facility] for further safety and further stabilization     Daily Progress: continue to encourage more time out of the MHICU. Working towards optimizing olanzapine       DIAGNOSTIC FORMULATION   #schizoaffective disorder, depressed type  #alcohol use disorder, remission status unknown     PLAN     Location: Unit 5  Legal Status: Orders Placed This Encounter      Voluntary    Safety Assessment:    Behavioral Orders   Procedures     Code 1 - Restrict to Unit     Routine Programming     As clinically indicated     Status 15     Every 15 minutes.      PTA medications held:   - none (not taking for extended period of time)     PTA medications continued/changed:   -none (nonadherent)     New medications initiated:   -olanzapine 10 mg at bedtime --> increase to 15 mg at bedtime (6/7/23) -> increase olanzapine to 20 mg at bedtime (6/9/23)    Today's Changes:  -increase olanzapine to 25 mg at bedtime     Programming: Patient will be treated in a therapeutic milieu with appropriate individual and group therapies. Education will be provided on diagnoses, medications, and  treatments. Encouraged behavioral activation and participation in group programming.     Medical diagnoses:  Per medicine    Disposition: pending clinical course        TREATMENT TEAM CARE PLAN     Progress: Continued symptoms.    Continued Stay Criteria/Rationale: Continued symptoms without sufficient improvement/resolution.    Medical/Physical: See above.    Precautions: See above.     Plan: Continue inpatient care with unit support and medication management.    Rationale for change in precautions or plan: NA due to no change.    Participants: Pedro Conde MD, Nursing, SW, OT.    The patient's care was discussed with the treatment team and chart notes were reviewed.       ATTESTATION    Pedro Conde MD  [facility]   Psychiatry    Video Visit: Patient has given verbal consent for video visit?: Yes  Type of Service: video visit for mental health treatment  Reason for Video Visit: COVID-19 [place]  Originating Site (patient location): [facility]  Distant Site (provider location): Remote Location  Mode of Communication: Video Conference via [employer]  Time of Service: Date: 06/14/2023 , Start: 09:30 end: 10:00

## 2023-06-14 NOTE — DISCHARGE INSTRUCTIONS
Behavioral Discharge Planning and Instructions    Summary: The patient is a 48 year old male that was admitted for psychosis. Patient was brought in by EMS/[employer]PD because he was found to be walking around town naked. He initial responded to police request to return home, however was later found to be walking around naked again.        Main Diagnosis: psychosis     Health Care Follow-up:     [facility]  [address]  [place] [address]  Phone: [phone]  Fax: [phone]       Attend all scheduled appointments with your outpatient providers. Call at least 24 hours in advance if you need to reschedule an appointment to ensure continued access to your outpatient providers.     Major Treatments, Procedures and Findings:  You were provided with: a psychiatric assessment, assessed for medical stability, medication evaluation and/or management, group therapy, family therapy, individual therapy, CD evaluation/assessment, milieu management, and medical interventions    Symptoms to Report: feeling more aggressive, increased confusion, losing more sleep, mood getting worse, or thoughts of suicide    Early warning signs can include: increased depression or anxiety sleep disturbances increased thoughts or behaviors of suicide or self-harm  increased unusual thinking, such as paranoia or hearing voices      Resources:   Crisis Intervention: 152.186.3043 or 715-474-7450 (TTY: 123.391.5788).  Call anytime for help.  National [place] on Mental Illness (www.[place].blaze.org): 549.137.4862 or 138-005-5279.  [place] Association for Children's Mental Health (www.macmh.org): 871.440.7408.  Alcoholics Anonymous (www.alcoholics-anonymous.org): Check your phone book for your local chapter.  Suicide Awareness Voices of Education (SAVE) (www.save.org): 912-256-DMJH (9032)  National Suicide Prevention Line (www.mentalhealthmn.org): 413-167-QECA (4861)  Mental Health Consumer/Survivor Network of  "[place] (www.mhcsn.net): 548-452-8159 or 289-159-1759  Mental Health Association of [place] (www.mentalhealth.org): 681.169.7837 or 248-351-1724  Text 4 Life: txt \"LIFE\" to 30146 for immediate support and crisis intervention  Crisis text line: Text \"[place]\" to 333304. Free, confidential, 24/7.  Crisis Intervention: 984.798.4800 or 909-040-0919. Call anytime for help.     General Medication Instructions:   See your medication sheet(s) for instructions.   Take all medicines as directed.  Make no changes unless your doctor suggests them.   Go to all your doctor visits.  Be sure to have all your required lab tests. This way, your medicines can be refilled on time.  Do not use any drugs not prescribed by your doctor.  Avoid alcohol.    Advance Directives:   Scanned document on file with [employer]? No scanned doc  Is document scanned? Pt states no documents  Honoring Choices Your Rights Handout: Informed and given  Was more information offered? Pt declined    The Treatment team has appreciated the opportunity to work with you. If you have any questions or concerns about your recent admission, you can contact the unit which can receive your call 24 hours a day, 7 days a week. They will be able to get in touch with a Provider if needed. The unit number is [phone] .    [place] Area:  [facility]- [phone]  [facility] Crisis Line: 1-695.207.4827  Advocates For Family Peace: 694-0452  Sexual Assault Program [facility]: 531.883.1188 or 1-207.456.2471  [facility] Battered Women's Program: 3-048-743-8511 Ext: 279       Calls answered Mon-Fri-8:00 am--4:30 pm    [place]:  Advocates for Family Peace: 7-132-687-0087  [facility] - 5-944-614-2967  [facility] first call for help: 2-340-116-7014  [facility] Crisis Center:  ([phone]) [phone]    [place] Area:  Warm Line: 1-905.812.6447       Calls answered Tuesday--Saturday 4:00 pm--10:00 pm  [facility] Crisis Line - " "090-913-7829  [place] [facility]abil[facility] 582-089-5308    [place] Statewide:  [place] Crisis and Referral Services: 1-585.336.8451  National Suicide Prevention Lifeline: 3-953-162-TALK (4285)   - sqx8grec- Text \"Life\" to 31281  First Call for Help: 2-1-1  [facility] Helpline- 6-345-IKRM-HELP   Crisis Text Line: Text  [place]  to 842004   "

## 2023-06-14 NOTE — PLAN OF CARE
Problem: Suicidal Behavior  Goal: Suicidal Behavior is Absent or Managed  Outcome: Progressing     Problem: Thought Process Alteration  Goal: Optimal Thought Clarity  Outcome: Progressing     Problem: Adult Behavioral Health Plan of Care  Goal: Patient-Specific Goal (Individualization)  Description: Patient will attend 75% of group programming.   Patient will eat 75% of meals.   Patient will sleep 6-8 hours per night.   Pt will be agreeable to treatment team recommendations     6/12/2023- Patient may wean to the open unit if behavior is appropriate. Treatment to assess daily.   Outcome: Progressing     Face to Face report received from DAVID Zapata.  Rounding completed. Patient observed in bed with eyes closed appearing to sleep for 7 hours with even & unlabored respirations noted. 15 minute and PRN checks all night. Patient was able to make needs known.  No complaints offered. Patient was free of falls and self-harm during NOC.  Will continue to monitor.    Face to face end of shift report [facility].     Jerri Angeles RN  6/14/2023  3:39 AM

## 2023-06-15 LAB
ALT SERPL-CCNC: <6 IU/L (ref 10–40)
AST SERPL-CCNC: 13 IU/L (ref 10–40)
CREATININE (EXTERNAL): 0.83 MG/DL (ref 0.7–1.2)
GFR ESTIMATED (EXTERNAL): 107 ML/MIN/1.73M2
GLUCOSE (EXTERNAL): 81 MG/DL (ref 70–99)
POTASSIUM (EXTERNAL): 4.3 MEQ/L (ref 3.4–5.1)
TSH SERPL-ACNC: 1.33 UIU/ML (ref 0.4–3.99)

## 2023-06-15 PROCEDURE — 99232 SBSQ HOSP IP/OBS MODERATE 35: CPT | Mod: GT | Performed by: PSYCHIATRY & NEUROLOGY

## 2023-06-15 PROCEDURE — 250N000013 HC RX MED GY IP 250 OP 250 PS 637: Performed by: PSYCHIATRY & NEUROLOGY

## 2023-06-15 PROCEDURE — 124N000001 HC R&B MH

## 2023-06-15 RX ADMIN — OLANZAPINE 25 MG: 10 TABLET, ORALLY DISINTEGRATING ORAL at 20:15

## 2023-06-15 ASSESSMENT — ACTIVITIES OF DAILY LIVING (ADL)
ADLS_ACUITY_SCORE: 40
LAUNDRY: UNABLE TO COMPLETE
ADLS_ACUITY_SCORE: 40
ORAL_HYGIENE: INDEPENDENT
ADLS_ACUITY_SCORE: 40
HYGIENE/GROOMING: INDEPENDENT
ADLS_ACUITY_SCORE: 40
DRESS: SCRUBS (BEHAVIORAL HEALTH);INDEPENDENT

## 2023-06-15 NOTE — PLAN OF CARE
"    Pt refused to wean to open unit this shift. He remained in bed throughout shift. Very minimal responses, asking for juice.   He does make eye contact with me throughout my interactions with him. He also requests his HS medication and is compliant with taking it.     He asks me repetitive questions back instead of answering assessment questions. \"When can I have my medication?\"  \"Apple juice and orange juice is all I need\"    He is odorous and disheveled. He appears to be paranoid and fearful to come out of room or onto open unit.     Face to face end of shift report communicated to oncoming DAVID Vogt RN  6/14/2023  10:44 PM                    "

## 2023-06-15 NOTE — PROGRESS NOTES
SW submitted updated notes to [facility] for the patient's upcoming commitment hearing.

## 2023-06-15 NOTE — PLAN OF CARE
Problem: Adult Behavioral Health Plan of Care  Goal: Patient-Specific Goal (Individualization)  Description: Patient will attend 75% of group programming.   Patient will eat 75% of meals.   Patient will sleep 6-8 hours per night.   Pt will be agreeable to treatment team recommendations     6/15/2023- Patient may wean to the open unit if behavior is appropriate. Treatment to assess daily.   Outcome: Not Progressing  Note: 07:30: Received end of shift report from DAVID Guthrie. Pt lying in bed upon arrival----  09:00: Pt continues to isolate self to room,     14:30: Paranoid/disorganized thought process continues, Guarded, mistrustful. Politely declines to come out onto open unit; staff strongly encouraged to wean out of MH-ICU. Shower supplies provided to patient, getting ready to shower @ present. Absent active suicide et/or homicidal ideation. Adequate food et fluid intake. Alert et oriented to person et place. No evidence of learning; short term memory is impaired.    Face to face end of shift report communicated to on-coming PM staff.     Saranya Abdi RN  6/15/2023  4:17 PM    Problem: Thought Process Alteration  Goal: Optimal Thought Clarity  Outcome: Not Progressing     Problem: Suicidal Behavior  Goal: Suicidal Behavior is Absent or Managed  Outcome: Progressing   Goal Outcome Evaluation:

## 2023-06-15 NOTE — PROGRESS NOTES
"  [facility] PSYCHIATRY  -  PROGRESS NOTE     ID   Name: Andre Bustos  MRN#: [id number]     SUBJECTIVE   Prior to interviewing the patient, I met with nursing and reviewed patient's clinical condition. We discussed clinical care both before and after the interview. I have reviewed the patient's clinical course by review of records including previous notes, labs, and vital signs.     Per nursing, the patient had the following behavioral events over the last 24-hours: continued paranoia. Refusing to transition out of the MHICU    On psychiatric interview, Andre is met within his room. He immediately states \"maybe tomorrow\" before writer has an opportunity to speak. He is referring to not wanting to leave his room within the ICU. He is not able to lucy about where he was living prior to this and states \"somewhere around [place]\" multiple times. In order to dodge the questions being asked of him, he begins to state \"what time is it?\", \"is tomorrow Friday\", \"court next week?\".  ultimately he is unable to engage in a back and forth conversation today.      MEDICATIONS   Scheduled Meds:    OLANZapine zydis  25 mg Oral At Bedtime     PRN Meds:.acetaminophen, alum & mag hydroxide-simethicone, hydrOXYzine, melatonin, OLANZapine **OR** OLANZapine, polyethylene glycol     ALLERGIES   No Known Allergies     VITALS   Vitals: /70   Pulse 91   Temp 98.9  F (37.2  C) (Tympanic)   Resp 16   Ht 1.778 m (5' 10\")   Wt 51 kg (112 lb 6.4 oz)   SpO2 99%   BMI 16.13 kg/m       MENTAL STATUS EXAM   Appearance:  awake,disheveled  Attitude:  cooperative  Eye Contact:  Poor  Mood:  \"fine\"  Affect: anxious  Speech:  mumbles  Psychomotor Behavior:  no evidence of tardive dyskinesia, dystonia, or tics  Thought Process: illogical  Associations:  no loose associations  Thought Content:  no evidence of suicidal ideation or homicidal ideation, paranoia prominent  Insight:  limited  Judgment:  limited  Oriented to: oriented to person " and place only  Attention Span and Concentration:  impaired  Recent and Remote Memory:  impaired  Gait and Station: Normal        LABS   No results found for this or any previous visit (from the past 24 hour(s)).      ASSESSMENT   Andre Bustos is a 48 year old male with a past psychiatric history notable for schizoaffective disorder, bipolar type and alcohol use disorder (associated with alcohol withdrawal seizures). Prior inpatient psychiatric hospitalizations, last known in Sept 2021 at [facility]. Previously psychiatric provider, Dr. Contreras from [id number] and most recently Miguelina STREETER CNP in August 2021. Presents to outside emergency department accompanied by law enforcement after being found disorganized twice in the community in same day, walking around naked. Patient was subsequently transferred and accepted for inpatient psychiatric hospitalization at [facility] for further safety and further stabilization     Daily Progress: continue to encourage more time out of the MHICU. Working towards optimizing olanzapine       DIAGNOSTIC FORMULATION   #schizoaffective disorder, depressed type  #alcohol use disorder, remission status unknown     PLAN     Location: Unit 5  Legal Status: Orders Placed This Encounter      Voluntary    Safety Assessment:    Behavioral Orders   Procedures     Code 1 - Restrict to Unit     Routine Programming     As clinically indicated     Status 15     Every 15 minutes.      PTA medications held:   - none (not taking for extended period of time)     PTA medications continued/changed:   -none (nonadherent)     New medications initiated:   -olanzapine 10 mg at bedtime --> increase to 15 mg at bedtime (6/7/23) -> increase olanzapine to 20 mg at bedtime (6/9/23)    Today's Changes:  -increase olanzapine to 25 mg at bedtime     Programming: Patient will be treated in a therapeutic milieu with appropriate individual and group therapies. Education  will be provided on diagnoses, medications, and treatments. Encouraged behavioral activation and participation in group programming.     Medical diagnoses:  Per medicine    Disposition: pending clinical course        TREATMENT TEAM CARE PLAN     Progress: Continued symptoms.    Continued Stay Criteria/Rationale: Continued symptoms without sufficient improvement/resolution.    Medical/Physical: See above.    Precautions: See above.     Plan: Continue inpatient care with unit support and medication management.    Rationale for change in precautions or plan: NA due to no change.    Participants: Pedro Conde MD, Nursing, SW, OT.    The patient's care was discussed with the treatment team and chart notes were reviewed.       ATTESTATION    Pedro Conde MD  [facility]   Psychiatry    Video Visit: Patient has given verbal consent for video visit?: Yes  Type of Service: video visit for mental health treatment  Reason for Video Visit: COVID-19 [place]  Originating Site (patient location): [facility]  Distant Site (provider location): Remote Location  Mode of Communication: Video Conference via [employer]  Time of Service: Date: 06/15/2023 , Start: 09:30 end: 10:00

## 2023-06-15 NOTE — PLAN OF CARE
Problem: Suicidal Behavior  Goal: Suicidal Behavior is Absent or Managed  Outcome: Progressing     Problem: Thought Process Alteration  Goal: Optimal Thought Clarity  Outcome: Progressing     Problem: Adult Behavioral Health Plan of Care  Goal: Patient-Specific Goal (Individualization)  Description: Patient will attend 75% of group programming.   Patient will eat 75% of meals.   Patient will sleep 6-8 hours per night.   Pt will be agreeable to treatment team recommendations     6/14/2023- Patient may wean to the open unit if behavior is appropriate. Treatment to assess daily.   Outcome: Progressing     Face to Face report received from DAVID Donnelly.  Rounding completed. Patient observed in bed with eyes closed appearing to sleep for 7 hours with even & unlabored respirations noted. 15 minute and PRN checks all night. Patient was able to make needs known.  No complaints offered. Patient was free of falls and self-harm during NOC.  Will continue to monitor.    Face to face end of shift report [facility].     Jerri Angeles RN  6/15/2023  5:38 AM

## 2023-06-16 PROCEDURE — 250N000013 HC RX MED GY IP 250 OP 250 PS 637: Performed by: PSYCHIATRY & NEUROLOGY

## 2023-06-16 PROCEDURE — 99232 SBSQ HOSP IP/OBS MODERATE 35: CPT | Mod: GT | Performed by: PSYCHIATRY & NEUROLOGY

## 2023-06-16 PROCEDURE — 124N000001 HC R&B MH

## 2023-06-16 RX ORDER — LORAZEPAM 0.5 MG/1
1 TABLET ORAL 2 TIMES DAILY
Status: DISCONTINUED | OUTPATIENT
Start: 2023-06-16 | End: 2023-07-06 | Stop reason: HOSPADM

## 2023-06-16 RX ADMIN — OLANZAPINE 25 MG: 10 TABLET, ORALLY DISINTEGRATING ORAL at 21:40

## 2023-06-16 ASSESSMENT — ACTIVITIES OF DAILY LIVING (ADL)
ADLS_ACUITY_SCORE: 50
ADLS_ACUITY_SCORE: 40
ADLS_ACUITY_SCORE: 50
ADLS_ACUITY_SCORE: 50
HYGIENE/GROOMING: PROMPTS;INDEPENDENT
LAUNDRY: UNABLE TO COMPLETE
LAUNDRY: UNABLE TO COMPLETE
ADLS_ACUITY_SCORE: 40
DRESS: SCRUBS (BEHAVIORAL HEALTH);INDEPENDENT
ORAL_HYGIENE: INDEPENDENT
ADLS_ACUITY_SCORE: 50
ADLS_ACUITY_SCORE: 40
DRESS: SCRUBS (BEHAVIORAL HEALTH);INDEPENDENT
ADLS_ACUITY_SCORE: 40
ADLS_ACUITY_SCORE: 40
ORAL_HYGIENE: INDEPENDENT
HYGIENE/GROOMING: INDEPENDENT;PROMPTS
ADLS_ACUITY_SCORE: 50

## 2023-06-16 NOTE — PLAN OF CARE
Problem: Suicidal Behavior  Goal: Suicidal Behavior is Absent or Managed  Outcome: Progressing    Pt denes SI     Problem: Thought Process Alteration  Goal: Optimal Thought Clarity  Outcome: No change    Pt reports that he is being constantly recorded on [employer]     Problem: Adult Behavioral Health Plan of Care  Goal: Patient-Specific Goal (Individualization)  Description: Patient will attend 75% of group programming.   Patient will eat 75% of meals.   Patient will sleep 6-8 hours per night.   Pt will be agreeable to treatment team recommendations     6/15/2023- Patient may wean to the open unit if behavior is appropriate. Staff to strongly encourage milieu activity participation. Treatment to assess daily.   Outcome: Progressing   Goal Outcome Evaluation:    Plan of Care Reviewed With: patient      1530 Face to face rounding complete.  Pt introduced to nursing for the shift.      Pt was a bit anxious and fearful at the beginning of the shift. He told me that he could not talk to me because everyone could hear us.  I pointed out that it was only him and me in his room and the door was closed.  He then told me that everything we do and say is being recorded on [employer].  I explained to him that there is not recording in his room. He told em that he is just not ready. He did come out of his room to get coffee and get his meal tray to eat in his room. He appeared very fearful looking both ways when leaving his room and walking quickly to get his tray or coffee.  He declined to take the Ativan with his HS medications.      2300 Face to face end of shift report communicated to Night shift RN's along with Pt's fall risk.     Binh Tirado RN  6/16/2023

## 2023-06-16 NOTE — PLAN OF CARE
Problem: Suicidal Behavior  Goal: Suicidal Behavior is Absent or Managed  Outcome: Progressing     Problem: Thought Process Alteration  Goal: Optimal Thought Clarity  Outcome: Progressing     Problem: Adult Behavioral Health Plan of Care  Goal: Patient-Specific Goal (Individualization)  Description: Patient will attend 75% of group programming.   Patient will eat 75% of meals.   Patient will sleep 6-8 hours per night.   Pt will be agreeable to treatment team recommendations     6/15/2023- Patient may wean to the open unit if behavior is appropriate. Staff to strongly encourage milieu activity participation. Treatment to assess daily.   Outcome: Progressing       Face to Face report received from DAVID Purcell.  Rounding completed. Patient observed in bed with eyes closed appearing to sleep for 7 hours with even & unlabored respirations noted. 15 minute and PRN checks all night. Patient was able to make needs known.  No complaints offered. Patient was free of falls and self-harm during NOC.  Will continue to monitor.    Face to face end of shift report communicated to [facility] day shift RN.     Jerri Angeles RN  6/16/2023  4:12 AM

## 2023-06-16 NOTE — PROGRESS NOTES
The patient's  Todd Adair called and updated the SW that he would like the patient to get a Rule 25 or Comp assessment and go to [facility].

## 2023-06-16 NOTE — PLAN OF CARE
"  Problem: Adult Behavioral Health Plan of Care  Goal: Patient-Specific Goal (Individualization)  Description: Patient will attend 75% of group programming.   Patient will eat 75% of meals.   Patient will sleep 6-8 hours per night.   Pt will be agreeable to treatment team recommendations     6/15/2023- Patient may wean to the open unit if behavior is appropriate. Staff to strongly encourage milieu activity participation. Treatment to assess daily.   Outcome: Not Progressing  Note: 07:35: Received end of shift report from DAVID Guthrie. Pt resting in bed upon arrival-----  09:00: Adequate food et fluid intake. No reduction in paranoia, continues to isolate, poor eye contact.     14:00: Pt moved out onto open unit prior to lunch, hesitant at first, not wanting a roommate, \"I need a personal room\" \"I can't\" \"I'll go later\"---  Pt did come out of room several times post move for coffee, lunch tray, fluids ect. Poor eye contact, withdrawn, is preoccupied, appears responding. Adequate food et fluid intake. Blunted, flat affect. Offers little communication; guarded/paranoid thought process continue.     Face to face end of shift report to be communicated to on-coming PM staff.     Saranya Abdi RN  6/16/2023  2:27 PM             Problem: Thought Process Alteration  Goal: Optimal Thought Clarity  Outcome: Progressing     Problem: Suicidal Behavior  Goal: Suicidal Behavior is Absent or Managed  Outcome: Progressing   Goal Outcome Evaluation:                        "

## 2023-06-16 NOTE — PROGRESS NOTES
"CLINICAL NUTRITION SERVICES  -  REASSESSMENT NOTE    Andre Bustos     48 yom admitted for suicidal ideation. Medical/psych hx includes schizoaffective disorder, bipolar type and alcohol use disorder. Intake continues to improve. No new weight to assess.    Diet Order: Regular, double portions  Intake: 11 meals with % intake- mostly 100% intake    Height: 5' 10\"  Weight: 112 lbs 6.4 oz  Body mass index is 16.13 kg/m .  Weight Status:  Underweight BMI <18.5  Weight History:   Wt Readings from Last 10 Encounters:   06/05/23 51 kg (112 lb 6.4 oz)   08/18/21 68.9 kg (151 lb 12.8 oz)      Weight used to calculate needs: 68.6kg ibw  Estimated Energy Needs: 4537-2509 kcals (30-35 Kcal/Kg)   Estimated Protein Needs:  grams protein (1-1.5 g pro/Kg)      Malnutrition Diagnosis: Severe malnutrition  In Context of:  Environmental or social circumstances     NUTRITION DIAGNOSIS:  Malnutrition related to inadequate energy/protein intake as evidenced by weight loss and loss of muscle and subcutaneous fat.     NUTRITION RECOMMENDATIONS  - Encourage intake with meals/snacks  - Obtain new measured weight as able     MONITORING AND EVALUATION:  RD will monitor intake, weight, labs         "

## 2023-06-16 NOTE — PROGRESS NOTES
"  [facility] PSYCHIATRY  -  PROGRESS NOTE     ID   Name: Andre Bustos  MRN#: [id number]     SUBJECTIVE   Prior to interviewing the patient, I met with nursing and reviewed patient's clinical condition. We discussed clinical care both before and after the interview. I have reviewed the patient's clinical course by review of records including previous notes, labs, and vital signs.     Per nursing, the patient had the following behavioral events over the last 24-hours: continued paranoia. Refusing to transition out of the MHICU. He gives no explanation as for why he does not feel like he can leave his room.     On psychiatric interview, Andre is met within his room. Continues to say the same thing. That he will spend more time in the milieu \"tomorrow\". He has said this for five days in a row. Discussed we will move him to open unit today.      MEDICATIONS   Scheduled Meds:    OLANZapine zydis  25 mg Oral At Bedtime     PRN Meds:.acetaminophen, alum & mag hydroxide-simethicone, hydrOXYzine, melatonin, OLANZapine **OR** OLANZapine, polyethylene glycol     ALLERGIES   No Known Allergies     VITALS   Vitals: /62 (BP Location: Left arm)   Pulse 64   Temp 97.2  F (36.2  C) (Tympanic)   Resp 16   Ht 1.778 m (5' 10\")   Wt 51 kg (112 lb 6.4 oz)   SpO2 98%   BMI 16.13 kg/m       MENTAL STATUS EXAM   Appearance:  awake,disheveled  Attitude:  cooperative  Eye Contact:  Poor  Mood:  \"alright\"  Affect: anxious  Speech:  mumbles  Psychomotor Behavior:  no evidence of tardive dyskinesia, dystonia, or tics  Thought Process: illogical  Associations:  no loose associations  Thought Content:  no evidence of suicidal ideation or homicidal ideation, paranoia prominent  Insight:  limited  Judgment:  limited  Oriented to: oriented to person and place only  Attention Span and Concentration:  impaired  Recent and Remote Memory:  impaired  Gait and Station: Normal        LABS   No results found for this or any previous visit " (from the past 24 hour(s)).      ASSESSMENT   Andre Bustos is a 48 year old male with a past psychiatric history notable for schizoaffective disorder, bipolar type and alcohol use disorder (associated with alcohol withdrawal seizures). Prior inpatient psychiatric hospitalizations, last known in Sept 2021 at [facility]. Previously psychiatric provider, Dr. Contreras from [id number] and most recently Miguelina STREETER CNP in August 2021. Presents to outside emergency department accompanied by law enforcement after being found disorganized twice in the community in same day, walking around naked. Patient was subsequently transferred and accepted for inpatient psychiatric hospitalization at [facility] for further safety and further stabilization     Daily Progress: limited progress. Will move out of MHICU and encourage more involvement on the unit. Will trial ativan 1 mg BID for possible negativism associated with catatonia in schizophrenia       DIAGNOSTIC FORMULATION   #schizoaffective disorder, depressed type  #alcohol use disorder, remission status unknown  #Malnutrition related to inadequate energy/protein intake as evidenced by weight loss and loss of muscle and subcutaneous fat.     PLAN     Location: Unit 5  Legal Status: Orders Placed This Encounter      Voluntary    Safety Assessment:    Behavioral Orders   Procedures     Code 1 - Restrict to Unit     Routine Programming     As clinically indicated     Status 15     Every 15 minutes.      PTA medications held:   - none (not taking for extended period of time)     PTA medications continued/changed:   -none (nonadherent)     New medications initiated:   -olanzapine 10 mg at bedtime --> increase to 15 mg at bedtime (6/7/23) -> increase olanzapine to 20 mg at bedtime (6/9/23)    Today's Changes:  -increase olanzapine to 25 mg at bedtime   -add ativan with concern for catatonia - negativism     Programming: Patient will be  treated in a therapeutic milieu with appropriate individual and group therapies. Education will be provided on diagnoses, medications, and treatments. Encouraged behavioral activation and participation in group programming.     Medical diagnoses:  Per medicine    Disposition: pending clinical course        TREATMENT TEAM CARE PLAN     Progress: Continued symptoms.    Continued Stay Criteria/Rationale: Continued symptoms without sufficient improvement/resolution.    Medical/Physical: See above.    Precautions: See above.     Plan: Continue inpatient care with unit support and medication management.    Rationale for change in precautions or plan: NA due to no change.    Participants: Pedro Conde MD, Nursing, SW, OT.    The patient's care was discussed with the treatment team and chart notes were reviewed.       ATTESTATION    Pedro Conde MD  [facility]   Psychiatry    Video Visit: Patient has given verbal consent for video visit?: Yes  Type of Service: video visit for mental health treatment  Reason for Video Visit: COVID-19 [place]  Originating Site (patient location): [facility]  Distant Site (provider location): Remote Location  Mode of Communication: Video Conference via [employer]  Time of Service: Date: 06/16/2023 , Start: 09:30 end: 10:00

## 2023-06-16 NOTE — PLAN OF CARE
Problem: Suicidal Behavior  Goal: Suicidal Behavior is Absent or Managed  Outcome: Progressing    Pt denies SI     Problem: Thought Process Alteration  Goal: Optimal Thought Clarity  Outcome: Progressing    PT is able to have simple conversation though appears preoccupied.     Problem: Adult Behavioral Health Plan of Care  Goal: Patient-Specific Goal (Individualization)  Description: Patient will attend 75% of group programming.   Patient will eat 75% of meals.   Patient will sleep 6-8 hours per night.   Pt will be agreeable to treatment team recommendations     6/15/2023- Patient may wean to the open unit if behavior is appropriate. Staff to strongly encourage milieu activity participation. Treatment to assess daily.   Outcome: Progressing   Goal Outcome Evaluation:    Plan of Care Reviewed With: patient      1530 Face to face rounding complete.  Pt introduced to nursing for the shift.    Pt was withdrawn to his room in bed almost all shift. He says that he is very tired and needs to sleep. He told me that he remembers me from being in the hospital at [facility].  Pt talked with me about living on the streets in [place] but would not answer questions about having a  or getting help with housing. He appears very preoccupied though denies hearing voices or having other hallucinations.  I encouraged him to come out of his room and try coming into the dayroom.  PT declined saying that he just can't.  I asked him if he feels paranoid or fearful.  He told me that he is not.  PT did not have an explanation for why he would not leave his room.  Pt's eye contact is very poor.        2300  Face to face end of shift report communicated to Night Shift RN's along with Pt's fall risk.     Binh Tirado RN  6/15/2023

## 2023-06-17 PROCEDURE — 124N000001 HC R&B MH

## 2023-06-17 PROCEDURE — 99233 SBSQ HOSP IP/OBS HIGH 50: CPT | Mod: GT | Performed by: PSYCHIATRY & NEUROLOGY

## 2023-06-17 PROCEDURE — 250N000013 HC RX MED GY IP 250 OP 250 PS 637: Performed by: PSYCHIATRY & NEUROLOGY

## 2023-06-17 RX ADMIN — LORAZEPAM 1 MG: 0.5 TABLET ORAL at 09:23

## 2023-06-17 RX ADMIN — OLANZAPINE 25 MG: 10 TABLET, ORALLY DISINTEGRATING ORAL at 21:02

## 2023-06-17 ASSESSMENT — ACTIVITIES OF DAILY LIVING (ADL)
ADLS_ACUITY_SCORE: 40
LAUNDRY: UNABLE TO COMPLETE
ORAL_HYGIENE: INDEPENDENT
ADLS_ACUITY_SCORE: 50
ADLS_ACUITY_SCORE: 41
ORAL_HYGIENE: INDEPENDENT
ADLS_ACUITY_SCORE: 50
DRESS: SCRUBS (BEHAVIORAL HEALTH)
ADLS_ACUITY_SCORE: 41
ADLS_ACUITY_SCORE: 40
ADLS_ACUITY_SCORE: 50
ADLS_ACUITY_SCORE: 50
HYGIENE/GROOMING: INDEPENDENT
ADLS_ACUITY_SCORE: 40
ADLS_ACUITY_SCORE: 50
DRESS: SCRUBS (BEHAVIORAL HEALTH);INDEPENDENT
HYGIENE/GROOMING: INDEPENDENT

## 2023-06-17 NOTE — PLAN OF CARE
Problem: Adult Behavioral Health Plan of Care  Goal: Patient-Specific Goal (Individualization)  Description: Patient will attend 75% of group programming.   Patient will eat 75% of meals.   Patient will sleep 6-8 hours per night.   Pt will be agreeable to treatment team recommendations     Outcome: Progressing     Pt observed sleeping in bed. 15 minute and PRN safety checks completed. Even respirations. No self harm or falls noted or reported this shift. Pt slept approximately 9.5 hours.     Face to face report will be communicated to oncoming RN.    Therese Espitia RN  6/17/2023

## 2023-06-17 NOTE — PLAN OF CARE
Problem: Adult Behavioral Health Plan of Care  Goal: Patient-Specific Goal (Individualization)  Description: Patient will attend 75% of group programming.   Patient will eat 75% of meals.   Patient will sleep 6-8 hours per night.   Pt will be agreeable to treatment team recommendations     6/15/2023- Patient may wean to the open unit if behavior is appropriate. Staff to strongly encourage milieu activity participation. Treatment to assess daily.   Outcome: Progressing     Problem: Thought Process Alteration  Goal: Optimal Thought Clarity  Outcome: Progressing     Problem: Suicidal Behavior  Goal: Suicidal Behavior is Absent or Managed  Outcome: Progressing   Goal Outcome Evaluation:       Pt. Came out to dayroom to get breakfast tray, looks both ways down the pierce before walking, eating at least 75% of meals, slept 9 hours last night, refused to take ativan, but did take it later, Pt. Is dismissive with staff, denies suicidal ideation, spending most time lying in bed, will continue to monitor progress.    Face to face end of shift report will be communicated to oncoming afternoon shift RN.     Lashae Zuñiga RN  6/17/2023  10:29 AM

## 2023-06-17 NOTE — PROGRESS NOTES
"  [facility] PSYCHIATRY  -  PROGRESS NOTE     ID   Name: Andre Bustos  MRN#: [id number]     SUBJECTIVE   Prior to interviewing the patient, I met with nursing and reviewed patient's clinical condition. We discussed clinical care both before and after the interview. I have reviewed the patient's clinical course by review of records including previous notes, labs, and vital signs.     Per nursing, the patient had the following behavioral events over the last 24-hours: he believes everything he does and says is being recorded on [employer]. Remains fearful.     On psychiatric interview, Andre is met within his room. Encouraged him to take lorazepam. He remains fearful. He is slightly more talkative. He makes bizzare comments about \"mankind\" that are not intelligible.  He is not wanting to talk about where he is living, very guarded.      MEDICATIONS   Scheduled Meds:    LORazepam  1 mg Oral BID     OLANZapine zydis  25 mg Oral At Bedtime     PRN Meds:.acetaminophen, alum & mag hydroxide-simethicone, hydrOXYzine, melatonin, OLANZapine **OR** OLANZapine, polyethylene glycol     ALLERGIES   No Known Allergies     VITALS   Vitals: /68 (BP Location: Left arm)   Pulse 93   Temp 97.5  F (36.4  C) (Tympanic)   Resp 16   Ht 1.778 m (5' 10\")   Wt 51 kg (112 lb 6.4 oz)   SpO2 99%   BMI 16.13 kg/m       MENTAL STATUS EXAM   Appearance:  awake,disheveled  Attitude:  cooperative  Eye Contact:  Poor  Mood:  \"fine\"  Affect: anxious  Speech:  mumbles  Psychomotor Behavior:  no evidence of tardive dyskinesia, dystonia, or tics  Thought Process: illogical  Associations:  no loose associations  Thought Content:  no evidence of suicidal ideation or homicidal ideation, paranoia prominent  Insight:  limited  Judgment:  limited  Oriented to: oriented to person and place only  Attention Span and Concentration:  impaired  Recent and Remote Memory:  impaired  Gait and Station: Normal        LABS   No results found for this or " any previous visit (from the past 24 hour(s)).      ASSESSMENT   Andre Bustos is a 48 year old male with a past psychiatric history notable for schizoaffective disorder, bipolar type and alcohol use disorder (associated with alcohol withdrawal seizures). Prior inpatient psychiatric hospitalizations, last known in Sept 2021 at [facility]. Previously psychiatric provider, Dr. Contreras from [id number] and most recently Miguelina STREETER CNP in August 2021. Presents to outside emergency department accompanied by law enforcement after being found disorganized twice in the community in same day, walking around naked. Patient was subsequently transferred and accepted for inpatient psychiatric hospitalization at [facility] for further safety and further stabilization     Daily Progress: slight decrease in negativism on interview with lorazepam. He is still not able to provide a full history. Will continue medications as is today and re-assess tomorrow.        DIAGNOSTIC FORMULATION   #schizoaffective disorder, depressed type  #alcohol use disorder, remission status unknown  #Malnutrition related to inadequate energy/protein intake as evidenced by weight loss and loss of muscle and subcutaneous fat.     PLAN     Location: Unit 5  Legal Status: Orders Placed This Encounter      Voluntary    Safety Assessment:    Behavioral Orders   Procedures     Code 1 - Restrict to Unit     Routine Programming     As clinically indicated     Status 15     Every 15 minutes.      PTA medications held:   - none (not taking for extended period of time)     PTA medications continued/changed:   -none (nonadherent)     New medications initiated:   -olanzapine 10 mg at bedtime --> increase to 15 mg at bedtime (6/7/23) -> increase olanzapine to 20 mg at bedtime (6/9/23)    Today's Changes:  Maintain lorazepam with concerns for catatonia - slight decrease in negativism noted    Programming: Patient will be  treated in a therapeutic milieu with appropriate individual and group therapies. Education will be provided on diagnoses, medications, and treatments. Encouraged behavioral activation and participation in group programming.     Medical diagnoses:  Per medicine    Disposition: pending clinical course        TREATMENT TEAM CARE PLAN     Progress: Continued symptoms.    Continued Stay Criteria/Rationale: Continued symptoms without sufficient improvement/resolution.    Medical/Physical: See above.    Precautions: See above.     Plan: Continue inpatient care with unit support and medication management.    Rationale for change in precautions or plan: NA due to no change.    Participants: Pedro Conde MD, Nursing, SW, OT.    The patient's care was discussed with the treatment team and chart notes were reviewed.       ATTESTATION    Pedro Conde MD  [facility]   Psychiatry    Video Visit: Patient has given verbal consent for video visit?: Yes  Type of Service: video visit for mental health treatment  Reason for Video Visit: COVID-[place]  Originating Site (patient location): [facility]  Distant Site (provider location): Remote Location  Mode of Communication: Video Conference via [employer]  Time of Service: Date: 06/17/2023 , Start: 09:30 end: 10:00

## 2023-06-18 PROCEDURE — 250N000013 HC RX MED GY IP 250 OP 250 PS 637: Performed by: PSYCHIATRY & NEUROLOGY

## 2023-06-18 PROCEDURE — 124N000001 HC R&B MH

## 2023-06-18 RX ADMIN — OLANZAPINE 25 MG: 10 TABLET, ORALLY DISINTEGRATING ORAL at 20:14

## 2023-06-18 RX ADMIN — LORAZEPAM 1 MG: 0.5 TABLET ORAL at 08:20

## 2023-06-18 ASSESSMENT — ACTIVITIES OF DAILY LIVING (ADL)
DRESS: SCRUBS (BEHAVIORAL HEALTH);INDEPENDENT
HYGIENE/GROOMING: INDEPENDENT
ADLS_ACUITY_SCORE: 41
DRESS: SCRUBS (BEHAVIORAL HEALTH)
ADLS_ACUITY_SCORE: 41
HYGIENE/GROOMING: INDEPENDENT
ADLS_ACUITY_SCORE: 41
ORAL_HYGIENE: INDEPENDENT
LAUNDRY: UNABLE TO COMPLETE
ADLS_ACUITY_SCORE: 41
ADLS_ACUITY_SCORE: 41
ORAL_HYGIENE: INDEPENDENT
ADLS_ACUITY_SCORE: 41

## 2023-06-18 NOTE — PLAN OF CARE
Problem: Suicidal Behavior  Goal: Suicidal Behavior is Absent or Managed  Outcome: Progressing    Pt denies SI     Problem: Thought Process Alteration  Goal: Optimal Thought Clarity  Outcome: Not Progressing    Pt continues to be paraniod and appears to be hallucinating     Problem: Adult Behavioral Health Plan of Care  Goal: Patient-Specific Goal (Individualization)  Description: Patient will attend 75% of group programming.   Patient will eat 75% of meals.   Patient will sleep 6-8 hours per night.   Pt will be agreeable to treatment team recommendations     6/15/2023- Patient may wean to the open unit if behavior is appropriate. Staff to strongly encourage milieu activity participation. Treatment to assess daily.   Outcome: Progressing  Flowsheets (Taken 6/17/2023 2215)  Patient Vulnerabilities:    food insecurity    history of unsuccessful treatment    housing insecurity    lacks insight into illness   Goal Outcome Evaluation:    Plan of Care Reviewed With: patient      1530 Face to face rounding complete.  Pt introduced to nursing for the shift.        Pt was up to get his meal and to get coffee a few times. He looks both ways when leaving his room and walks briskly back an forth from the dayroom. He would not tell me why he is so frightened today. He just said that he cannot be around people.  Pt refused the HS ativan saying that he does not need it    2300 Face to face end of shift report communicated to Night Shift RN's along with Pt's fall risk.     Binh Tirado RN  6/17/2023

## 2023-06-18 NOTE — PLAN OF CARE
"  Problem: Adult Behavioral Health Plan of Care  Goal: Patient-Specific Goal (Individualization)  Description: Patient will attend 75% of group programming.   Patient will eat 75% of meals.   Patient will sleep 6-8 hours per night.   Pt will be agreeable to treatment team recommendations       Outcome: Progressing     Problem: Thought Process Alteration  Goal: Optimal Thought Clarity  Outcome: Progressing     Problem: Suicidal Behavior  Goal: Suicidal Behavior is Absent or Managed  Outcome: Progressing   Goal Outcome Evaluation:       Pt. Out to lounge to get breakfast tray, appetite is good, ate 100%, walks quickly back to room, appears paranoid and frightened, denies depression, anxiety, suicidal ideation and hallucinations, appears to be responding to internal stimuli, spending most time in bed, stated \"I might go out to the lounge for lunch\", will continue to monitor progress.    Face to face end of shift report will be communicated to oncoming afternoon shift RN.     Lashae Zuñiga RN  6/18/2023  10:49 AM                       "

## 2023-06-18 NOTE — PLAN OF CARE
Problem: Adult Behavioral Health Plan of Care  Goal: Patient-Specific Goal (Individualization)  Description: Patient will attend 75% of group programming.   Patient will eat 75% of meals.   Patient will sleep 6-8 hours per night.   Pt will be agreeable to treatment team recommendations     Outcome: Progressing     Pt observed sleeping in bed. 15 minute and PRN safety checks completed. Even respirations. No self harm or falls noted or reported this shift. Pt slept approximately 7 hours.     Face to face report will be communicated to oncoming RN.    Therese Espitia RN  6/18/2023

## 2023-06-19 PROCEDURE — 99232 SBSQ HOSP IP/OBS MODERATE 35: CPT | Mod: GT | Performed by: PSYCHIATRY & NEUROLOGY

## 2023-06-19 PROCEDURE — 124N000001 HC R&B MH

## 2023-06-19 PROCEDURE — 250N000013 HC RX MED GY IP 250 OP 250 PS 637: Performed by: PSYCHIATRY & NEUROLOGY

## 2023-06-19 RX ADMIN — LORAZEPAM 1 MG: 0.5 TABLET ORAL at 08:38

## 2023-06-19 RX ADMIN — OLANZAPINE 25 MG: 10 TABLET, ORALLY DISINTEGRATING ORAL at 20:14

## 2023-06-19 ASSESSMENT — ACTIVITIES OF DAILY LIVING (ADL)
ADLS_ACUITY_SCORE: 41
DRESS: INDEPENDENT;SCRUBS (BEHAVIORAL HEALTH)
DRESS: SCRUBS (BEHAVIORAL HEALTH)
ADLS_ACUITY_SCORE: 41
ADLS_ACUITY_SCORE: 41
HYGIENE/GROOMING: INDEPENDENT
HYGIENE/GROOMING: INDEPENDENT
ADLS_ACUITY_SCORE: 41
ADLS_ACUITY_SCORE: 41
ORAL_HYGIENE: INDEPENDENT
LAUNDRY: UNABLE TO COMPLETE
ADLS_ACUITY_SCORE: 41
LAUNDRY: UNABLE TO COMPLETE
ADLS_ACUITY_SCORE: 41
ADLS_ACUITY_SCORE: 41
ORAL_HYGIENE: INDEPENDENT

## 2023-06-19 NOTE — PLAN OF CARE
"  Problem: Adult Behavioral Health Plan of Care  Goal: Patient-Specific Goal (Individualization)  Description: Patient will attend 75% of group programming.   Patient will eat 75% of meals.   Patient will sleep 6-8 hours per night.   Pt will be agreeable to treatment team recommendations     Patient is isolative and withdrawn, spending time in his room lying on his bed. Affect is flat, mood is calm. Eye contact is minimal. He admits to anxiety and depression, states \"I got lots going on\". He denies SI, HI, hallucinations, and pain. States he has not attended groups, that \"I look forward to dinner, that's all I got\". Has been appropriate with staff and peers.   Patient refused his bedtime Ativan, states \"I take that in the morning\".  Face to face end of shift report communicated to oncoming RN.     Miranda Regan RN  6/19/2023  10:39 PM    Outcome: Progressing     Problem: Thought Process Alteration  Goal: Optimal Thought Clarity  Outcome: Progressing     Problem: Suicidal Behavior  Goal: Suicidal Behavior is Absent or Managed  Outcome: Progressing   Goal Outcome Evaluation:    Plan of Care Reviewed With: patient                   "

## 2023-06-19 NOTE — PROGRESS NOTES
"  [facility] PSYCHIATRY  -  PROGRESS NOTE     ID   Name: Andre Bustos  MRN#: [id number]     SUBJECTIVE   Prior to interviewing the patient, I met with nursing and reviewed patient's clinical condition. We discussed clinical care both before and after the interview. I have reviewed the patient's clinical course by review of records including previous notes, labs, and vital signs.     Per nursing, the patient had the following behavioral events over the last 24-hours: is eating out of his room more     On psychiatric interview, Andre is met within his room. He is intermittently not taking his medications. When he is asked why, he states \" I will take them\".  He was encouraged to go to groups, he states \"what is the point\".  He is more reactive today, but still speaks minimally.  He is aware he has court tomorrow.      MEDICATIONS   Scheduled Meds:    LORazepam  1 mg Oral BID     OLANZapine zydis  25 mg Oral At Bedtime     PRN Meds:.acetaminophen, alum & mag hydroxide-simethicone, hydrOXYzine, melatonin, OLANZapine **OR** OLANZapine, polyethylene glycol     ALLERGIES   No Known Allergies     VITALS   Vitals: /82 (BP Location: Left arm)   Pulse 103   Temp 98.4  F (36.9  C) (Tympanic)   Resp 16   Ht 1.778 m (5' 10\")   Wt 57.4 kg (126 lb 9.6 oz)   SpO2 99%   BMI 18.17 kg/m       MENTAL STATUS EXAM   Appearance:  awake,disheveled  Attitude:  cooperative  Eye Contact:  Poor  Mood:  \"fine\"  Affect: anxious  Speech:  mumbles  Psychomotor Behavior:  no evidence of tardive dyskinesia, dystonia, or tics  Thought Process: illogical  Associations:  no loose associations  Thought Content:  no evidence of suicidal ideation or homicidal ideation, paranoia prominent  Insight:  limited  Judgment:  limited  Oriented to: oriented to person and place only  Attention Span and Concentration:  impaired  Recent and Remote Memory:  impaired  Gait and Station: Normal        LABS   No results found for this or any previous " visit (from the past 24 hour(s)).      ASSESSMENT   Andre Bustos is a 48 year old male with a past psychiatric history notable for schizoaffective disorder, bipolar type and alcohol use disorder (associated with alcohol withdrawal seizures). Prior inpatient psychiatric hospitalizations, last known in Sept 2021 at [facility]. Previously psychiatric provider, Dr. Contreras from [id number] and most recently Miguelina STREETER CNP in August 2021. Presents to outside emergency department accompanied by law enforcement after being found disorganized twice in the community in same day, walking around naked. Patient was subsequently transferred and accepted for inpatient psychiatric hospitalization at [facility] for further safety and further stabilization     Daily Progress: slight decrease in negativism on interview with lorazepam. He is still not able to provide a full history. Will continue medications, court tomorrow        DIAGNOSTIC FORMULATION   #schizoaffective disorder, depressed type  #alcohol use disorder, remission status unknown  #Malnutrition related to inadequate energy/protein intake as evidenced by weight loss and loss of muscle and subcutaneous fat.     PLAN     Location: Unit 5  Legal Status: Orders Placed This Encounter      Voluntary    Safety Assessment:    Behavioral Orders   Procedures     Code 1 - Restrict to Unit     Routine Programming     As clinically indicated     Status 15     Every 15 minutes.      PTA medications held:   - none (not taking for extended period of time)     PTA medications continued/changed:   -none (nonadherent)     New medications initiated:   -olanzapine 10 mg at bedtime --> increase to 15 mg at bedtime (6/7/23) -> increase olanzapine to 20 mg at bedtime (6/9/23)    Today's Changes:  Maintain lorazepam with concerns for catatonia - slight decrease in negativism noted    Programming: Patient will be treated in a therapeutic milieu with  appropriate individual and group therapies. Education will be provided on diagnoses, medications, and treatments. Encouraged behavioral activation and participation in group programming.     Medical diagnoses:  Per medicine    Disposition: pending clinical course        TREATMENT TEAM CARE PLAN     Progress: Continued symptoms.    Continued Stay Criteria/Rationale: Continued symptoms without sufficient improvement/resolution.    Medical/Physical: See above.    Precautions: See above.     Plan: Continue inpatient care with unit support and medication management.    Rationale for change in precautions or plan: NA due to no change.    Participants: Pedro Conde MD, Nursing, SW, OT.    The patient's care was discussed with the treatment team and chart notes were reviewed.       ATTESTATION    Pedro Conde MD  [facility]   Psychiatry    Video Visit: Patient has given verbal consent for video visit?: Yes  Type of Service: video visit for mental health treatment  Reason for Video Visit: COVID-19 [place]  Originating Site (patient location): [facility]  Distant Site (provider location): Remote Location  Mode of Communication: Video Conference via [employer]  Time of Service: Date: 06/19/2023 , Start: 09:30 end: 10:00

## 2023-06-19 NOTE — PLAN OF CARE
"Problem: Suicidal Behavior  Goal: Suicidal Behavior is Absent or Managed  Outcome: Progressing    Pt denies SI     Problem: Thought Process Alteration  Goal: Optimal Thought Clarity  Outcome: Progressing    Pt is still paranoid and supspicious but is conversing more     Problem: Adult Behavioral Health Plan of Care  Goal: Patient-Specific Goal (Individualization)  Description: Patient will attend 75% of group programming.   Patient will eat 75% of meals.   Patient will sleep 6-8 hours per night.   Pt will be agreeable to treatment team recommendations       Outcome: Progressing   Goal Outcome Evaluation:    Plan of Care Reviewed With: patient      1530 Face to face rounding complete.  Pt introduced to nursing for the shift.    Pt shaved and showered before supper. He was more talkative with me and seemed slightly less paranoid. He talked about his commitment hearing and I did some teaching about this with him. I explained that it might be good if the county can help him find a place to live since he has been homeless for a long time.  He responded saying that there was no hope for him.  Pt came out of his room to get his meal and to get coffee a few times. He went into the group room and brought snack back to his room as well. He smiled when I told him he looked \"Dapper\" after shaving.      2300 Face to face end of shift report communicated to Night Shift RN's along with Pt's fall risk     Binh Tirado RN  6/18/2023  10:26 PM                  "

## 2023-06-19 NOTE — PLAN OF CARE
"RUPAL DAVIES RN  6/19/2023  7:43 AM  Face to face shift report received from DAVID Saleh. Rounding completed, pt observed.       0900-Pt reports high anxiety and depression stating \"I'm going to be here forever\".  When asked about SI, pt stated \"not really\" and would not elaborate any further.  Pt will not use eye contact when conversing with staff.  Pt stated \"I just want to be alone and I don't think I'll go to groups today\".  Educated pt on the importance of groups.  Pt apologized for \"being rude\"- which he was not at all.  Reassured pt he was not rude.  Denies HI and pain.     1310-Pt showered, shaved, and clipped his nails.  Isolated and withdrawn to room this morning.    1415-Pt has spent all shift in his room isolated.  Offers no complaints.  Cooperative with staff.      Problem: Adult Behavioral Health Plan of Care  Goal: Patient-Specific Goal (Individualization)  Description: Patient will attend 75% of group programming.   Patient will eat 75% of meals.   Patient will sleep 6-8 hours per night.   Pt will be agreeable to treatment team recommendations   Outcome: Progressing     Problem: Thought Process Alteration  Goal: Optimal Thought Clarity  Outcome: Progressing     Problem: Suicidal Behavior  Goal: Suicidal Behavior is Absent or Managed  Outcome: Progressing    Face to face end of shift report communicated to oncoming shift RN.                                "

## 2023-06-19 NOTE — PLAN OF CARE
Problem: Adult Behavioral Health Plan of Care  Goal: Patient-Specific Goal (Individualization)  Description: Patient will attend 75% of group programming.   Patient will eat 75% of meals.   Patient will sleep 6-8 hours per night.   Pt will be agreeable to treatment team recommendations       Outcome: Progressing     Pt observed sleeping in bed. 15 minute and PRN safety checks completed. Even respirations. No self harm or falls noted or reported this shift. Pt slept approximately 9 hours.     Face to face report will be communicated to oncoming RN.    Therese Espitia RN  6/19/2023

## 2023-06-20 PROCEDURE — 250N000013 HC RX MED GY IP 250 OP 250 PS 637: Performed by: PSYCHIATRY & NEUROLOGY

## 2023-06-20 PROCEDURE — 124N000001 HC R&B MH

## 2023-06-20 PROCEDURE — 99232 SBSQ HOSP IP/OBS MODERATE 35: CPT | Mod: GT | Performed by: PSYCHIATRY & NEUROLOGY

## 2023-06-20 RX ORDER — OLANZAPINE 10 MG/1
30 TABLET, ORALLY DISINTEGRATING ORAL AT BEDTIME
Status: DISCONTINUED | OUTPATIENT
Start: 2023-06-20 | End: 2023-07-06 | Stop reason: HOSPADM

## 2023-06-20 RX ADMIN — OLANZAPINE 30 MG: 10 TABLET, ORALLY DISINTEGRATING ORAL at 20:34

## 2023-06-20 RX ADMIN — LORAZEPAM 1 MG: 0.5 TABLET ORAL at 08:35

## 2023-06-20 ASSESSMENT — ACTIVITIES OF DAILY LIVING (ADL)
ADLS_ACUITY_SCORE: 41
ORAL_HYGIENE: INDEPENDENT
ADLS_ACUITY_SCORE: 41
HYGIENE/GROOMING: INDEPENDENT
DRESS: SCRUBS (BEHAVIORAL HEALTH)
LAUNDRY: UNABLE TO COMPLETE
ADLS_ACUITY_SCORE: 41
DRESS: INDEPENDENT;SCRUBS (BEHAVIORAL HEALTH)
LAUNDRY: UNABLE TO COMPLETE
ADLS_ACUITY_SCORE: 41
HYGIENE/GROOMING: INDEPENDENT
ORAL_HYGIENE: INDEPENDENT
ADLS_ACUITY_SCORE: 41

## 2023-06-20 NOTE — PROGRESS NOTES
"  [facility] PSYCHIATRY  -  PROGRESS NOTE     ID   Name: Andre Bustos  MRN#: [id number]     SUBJECTIVE   Prior to interviewing the patient, I met with nursing and reviewed patient's clinical condition. We discussed clinical care both before and after the interview. I have reviewed the patient's clinical course by review of records including previous notes, labs, and vital signs.     Per nursing, the patient had the following behavioral events over the last 24-hours: only out of his room to retrieve his tray    On psychiatric interview, Andre is met within his room. He remains much the same. Some slight improvement in that he will interact with staff but brief.  Resistant to recommendations. Still concerned that he is being tracked or recorded.       MEDICATIONS   Scheduled Meds:    LORazepam  1 mg Oral BID     OLANZapine zydis  30 mg Oral At Bedtime     PRN Meds:.acetaminophen, alum & mag hydroxide-simethicone, hydrOXYzine, melatonin, OLANZapine **OR** OLANZapine, polyethylene glycol     ALLERGIES   No Known Allergies     VITALS   Vitals: /59   Pulse 75   Temp 97.7  F (36.5  C) (Tympanic)   Resp 20   Ht 1.778 m (5' 10\")   Wt 57.4 kg (126 lb 9.6 oz)   SpO2 98%   BMI 18.17 kg/m       MENTAL STATUS EXAM   Appearance:  awake,disheveled  Attitude:  cooperative  Eye Contact:  Poor  Mood:  \"does not matter\"  Affect: flat  Speech:  mumbles  Psychomotor Behavior:  no evidence of tardive dyskinesia, dystonia, or tics  Thought Process: illogical  Associations:  no loose associations  Thought Content:  no evidence of suicidal ideation or homicidal ideation, paranoia prominent  Insight:  limited  Judgment:  limited  Oriented to: oriented to person and place only  Attention Span and Concentration:  impaired  Recent and Remote Memory:  impaired  Gait and Station: Normal        LABS   No results found for this or any previous visit (from the past 24 hour(s)).      ASSESSMENT   Andre Bustos is a 48 year old male " with a past psychiatric history notable for schizoaffective disorder, bipolar type and alcohol use disorder (associated with alcohol withdrawal seizures). Prior inpatient psychiatric hospitalizations, last known in Sept 2021 at [facility]. Previously psychiatric provider, Dr. Contreras from [id number] and most recently Miguelina STREETER CNP in August 2021. Presents to outside emergency department accompanied by law enforcement after being found disorganized twice in the community in same day, walking around naked. Patient was subsequently transferred and accepted for inpatient psychiatric hospitalization at [facility] for further safety and further stabilization     Daily Progress:no changes, court today       DIAGNOSTIC FORMULATION   #schizoaffective disorder, depressed type  #alcohol use disorder, remission status unknown  #Malnutrition related to inadequate energy/protein intake as evidenced by weight loss and loss of muscle and subcutaneous fat.     PLAN     Location: Unit 5  Legal Status: Orders Placed This Encounter      Voluntary    Safety Assessment:    Behavioral Orders   Procedures     Code 1 - Restrict to Unit     Routine Programming     As clinically indicated     Status 15     Every 15 minutes.      PTA medications held:   - none (not taking for extended period of time)     PTA medications continued/changed:   -none (nonadherent)     New medications initiated:   -olanzapine 10 mg at bedtime --> increase to 15 mg at bedtime (6/7/23) -> increase olanzapine to 20 mg at bedtime (6/9/23)    Today's Changes:  Maintain lorazepam with concerns for catatonia - slight decrease in negativism noted    Programming: Patient will be treated in a therapeutic milieu with appropriate individual and group therapies. Education will be provided on diagnoses, medications, and treatments. Encouraged behavioral activation and participation in group programming.     Medical diagnoses:  Per  medicine    Disposition: pending clinical course        TREATMENT TEAM CARE PLAN     Progress: Continued symptoms.    Continued Stay Criteria/Rationale: Continued symptoms without sufficient improvement/resolution.    Medical/Physical: See above.    Precautions: See above.     Plan: Continue inpatient care with unit support and medication management.    Rationale for change in precautions or plan: NA due to no change.    Participants: Pedro Conde MD, Nursing, SW, OT.    The patient's care was discussed with the treatment team and chart notes were reviewed.       ATTESTATION    Pedro Conde MD  [facility]   Psychiatry    Video Visit: Patient has given verbal consent for video visit?: Yes  Type of Service: video visit for mental health treatment  Reason for Video Visit: COVID-19 [place]  Originating Site (patient location): [facility]  Distant Site (provider location): Remote Location  Mode of Communication: Video Conference via [employer]  Time of Service: Date: 06/20/2023 , Start: 09:30 end: 10:00

## 2023-06-20 NOTE — PLAN OF CARE
RUPAL DAVIES, RN  6/20/2023  7:44 AM  Face to face shift report received from DAVID Chino. Rounding completed, pt observed.       Problem: Adult Behavioral Health Plan of Care  Goal: Patient-Specific Goal (Individualization)  Description: Patient will attend 75% of group programming.   Patient will eat 75% of meals.   Patient will sleep 6-8 hours per night.   Pt will be agreeable to treatment team recommendations   Outcome: Progressing     Problem: Thought Process Alteration  Goal: Optimal Thought Clarity  Outcome: Progressing     Problem: Suicidal Behavior  Goal: Suicidal Behavior is Absent or Managed  Outcome: Progressing    Face to face end of shift report communicated to oncoming shift RN.          Plan of Care Reviewed With: patient

## 2023-06-20 NOTE — PLAN OF CARE
Problem: Adult Behavioral Health Plan of Care  Goal: Patient-Specific Goal (Individualization)  Description: Patient will attend 75% of group programming.   Patient will eat 75% of meals.   Patient will sleep 6-8 hours per night.   Pt will be agreeable to treatment team recommendations       Outcome: Progressing   Goal Outcome Evaluation:       Face to face shift report received from RN. Rounding completed, pt observed.Client rested in room for 7 hours with eyes closed and respirations noted.Face to face report will be communicated to oncoming RN.    Matti Lorenzo RN  6/20/2023  6:37 AM

## 2023-06-20 NOTE — PLAN OF CARE
"LUCY DAVIES RN  6/20/2023  7:54 AM  Face to face shift report received from DAVID Chino. Rounding completed, pt observed.     0855-Pt ate breakfast in room.  Isolative and withdrawn this morning.  Encouraged pt to attend group today, pt \"will think about it\".  Admits to anxiety and depression but \"there's nothing I can do about it\".  Guarded and suspicious when asked more than a few questions as once.  Denies pain.  1400-Pt came out of his room for the first time (excluding getting and returning meal trays) and complains about getting a roommate.  Educated pt that currently there is no other available room but we will take his request into consideration if something becomes available.  Pt did not attend groups.        Problem: Adult Behavioral Health Plan of Care  Goal: Patient-Specific Goal (Individualization)  Description: Patient will attend 75% of group programming.   Patient will eat 75% of meals.   Patient will sleep 6-8 hours per night.   Pt will be agreeable to treatment team recommendations   6/20/2023 0813 by Lucy Davies, RN  Outcome: Progressing     Problem: Thought Process Alteration  Goal: Optimal Thought Clarity  6/20/2023 0813 by Lucy Davies, RN  Outcome: Progressing     Problem: Suicidal Behavior  Goal: Suicidal Behavior is Absent or Managed  6/20/2023 0813 by Lucy Davies, RN  Outcome: Progressing     Face to face end of shift report communicated to oncoming shift RN.          Plan of Care Reviewed With: patient                   "

## 2023-06-20 NOTE — PLAN OF CARE
Problem: Adult Behavioral Health Plan of Care  Goal: Patient-Specific Goal (Individualization)  Description: Patient will attend 75% of group programming.   Patient will eat 75% of meals.   Patient will sleep 6-8 hours per night.   Pt will be agreeable to treatment team recommendations     Patient is isolative and withdrawn, spending time in his room, he peaks out the door and steps in the lounge to observe, but goes back to his room. Affect is flat, mood is calm. He admits to anxiety and depression, then mumbles incoherently. States he is fine, then turns away from writer. States he will only take Zyprexa at bedtime. Has been appropriate with staff and peers.   Patient took the Zyprexa 30 mg, but was asking why it was increased. He refused the Ativan.   Face to face end of shift report communicated to oncoming RN.     Miranda Regan RN  6/20/2023  10:33 PM    Outcome: Progressing     Problem: Thought Process Alteration  Goal: Optimal Thought Clarity  Outcome: Progressing     Problem: Suicidal Behavior  Goal: Suicidal Behavior is Absent or Managed  Outcome: Progressing   Goal Outcome Evaluation:    Plan of Care Reviewed With: patient

## 2023-06-21 PROCEDURE — 250N000013 HC RX MED GY IP 250 OP 250 PS 637: Performed by: PSYCHIATRY & NEUROLOGY

## 2023-06-21 PROCEDURE — 124N000001 HC R&B MH

## 2023-06-21 PROCEDURE — 99232 SBSQ HOSP IP/OBS MODERATE 35: CPT | Mod: GT | Performed by: PSYCHIATRY & NEUROLOGY

## 2023-06-21 RX ADMIN — LORAZEPAM 1 MG: 0.5 TABLET ORAL at 08:35

## 2023-06-21 RX ADMIN — OLANZAPINE 30 MG: 10 TABLET, ORALLY DISINTEGRATING ORAL at 20:10

## 2023-06-21 ASSESSMENT — ACTIVITIES OF DAILY LIVING (ADL)
ADLS_ACUITY_SCORE: 41
LAUNDRY: UNABLE TO COMPLETE
ADLS_ACUITY_SCORE: 41
DRESS: INDEPENDENT
HYGIENE/GROOMING: INDEPENDENT
ADLS_ACUITY_SCORE: 41
ORAL_HYGIENE: INDEPENDENT
ADLS_ACUITY_SCORE: 41
ADLS_ACUITY_SCORE: 41

## 2023-06-21 NOTE — PROGRESS NOTES
The patient received a copy of his [facility] Order for Good Cause for Continuance.     The patient has his Continuance Hearing 6/23/23 @ 8:50 am.       The patient received a copy of court order.    SW provided the patient another copy of all his court paper work because he wishes to review it before the next court hearing.

## 2023-06-21 NOTE — PLAN OF CARE
"Face to face shift report received from DAVID Dangelo.       Problem: Adult Behavioral Health Plan of Care  Goal: Patient-Specific Goal (Individualization)  Description: Patient will attend 75% of group programming.   Patient will eat 75% of meals.   Patient will sleep 6-8 hours per night.   Pt will be agreeable to treatment team recommendations   Outcome: Not Progressing     Cooperative. Refused HS Ativan 1mg. Compliant with HS Zyprexa 30mg, stating \"I'll try it one more time.\" Flat affect. Reports \"I'm fine... I'm good.\" Offers very little in conversation. Withdrawn to room majority of shift, only coming to lounge to retrieve dinner tray. No reports of pain.     Problem: Thought Process Alteration  Goal: Optimal Thought Clarity  Outcome: Not Progressing       Problem: Suicidal Behavior  Goal: Suicidal Behavior is Absent or Managed  Outcome: Progressing   Free from self harm or injury this shift.     Face to face end of shift report to be communicated to oncoming RN.       "

## 2023-06-21 NOTE — PLAN OF CARE
"Face to face end of shift report received from Matti HAMLIN RN. Rounding completed. Patient observed in bed, awake.     Pt has been withdrawn, isolative to his room for most of the shift. He denied any SI/HI and AH/VH. Denied pain. He took scheduled Ativan as prescribed then stated \"Well I guess I'll just lay here.\" Encouraged to get up out of bed and participate in groups or the unit milieu. Pt stated \"Nah, I'm just going to stay in here. That Ativan really mellows me out.\" Pt does come out to get his meal tray, but eats in his room. Steady gait- no falls. He is able to make his needs known. Frequent rounding.     Problem: Adult Behavioral Health Plan of Care  Goal: Patient-Specific Goal (Individualization)  Description: Patient will attend 75% of group programming.   Patient will eat 75% of meals.   Patient will sleep 6-8 hours per night.   Pt will be agreeable to treatment team recommendations       Outcome: Progressing     Problem: Thought Process Alteration  Goal: Optimal Thought Clarity  Outcome: Progressing     Problem: Suicidal Behavior  Goal: Suicidal Behavior is Absent or Managed  Outcome: Progressing       Loreto Ludwig RN  6/21/2023  12:43 PM    "

## 2023-06-21 NOTE — PROGRESS NOTES
"  [facility] PSYCHIATRY  -  PROGRESS NOTE     ID   Name: Andre Bustos  MRN#: [id number]     SUBJECTIVE   Prior to interviewing the patient, I met with nursing and reviewed patient's clinical condition. We discussed clinical care both before and after the interview. I have reviewed the patient's clinical course by review of records including previous notes, labs, and vital signs.     Per nursing, the patient had the following behavioral events over the last 24-hours: only out of his room to retrieve his tray    On psychiatric interview, Andre is met within his room. He states he is \"okay\" this morning. He is able to state that his court date got continued until Friday. When asking how he feels about this, he states \"it is fine, I am fine\". Before writer can even ask if he is spending any time out of his room, he states \"I am good, I am trying to go out\". He is asked if he feels our conversation is being recorded and he looks around the room stating \"it is good\".      MEDICATIONS   Scheduled Meds:    LORazepam  1 mg Oral BID     OLANZapine zydis  30 mg Oral At Bedtime     PRN Meds:.acetaminophen, alum & mag hydroxide-simethicone, hydrOXYzine, melatonin, OLANZapine **OR** OLANZapine, polyethylene glycol     ALLERGIES   No Known Allergies     VITALS   Vitals: /70 (BP Location: Right arm)   Pulse 93   Temp 97.6  F (36.4  C) (Tympanic)   Resp 14   Ht 1.778 m (5' 10\")   Wt 57.4 kg (126 lb 9.6 oz)   SpO2 99%   BMI 18.17 kg/m       MENTAL STATUS EXAM   Appearance:  awake,disheveled  Attitude:  cooperative  Eye Contact:  Poor  Mood:  \"okay\"  Affect: flat  Speech:  mumbles  Psychomotor Behavior:  no evidence of tardive dyskinesia, dystonia, or tics  Thought Process: illogical  Associations:  no loose associations  Thought Content:  no evidence of suicidal ideation or homicidal ideation, paranoia prominent  Insight:  limited  Judgment:  limited  Oriented to: oriented to person and place only  Attention Span " and Concentration:  impaired  Recent and Remote Memory:  impaired  Gait and Station: Normal        LABS   No results found for this or any previous visit (from the past 24 hour(s)).      ASSESSMENT   Andre Bustos is a 48 year old male with a past psychiatric history notable for schizoaffective disorder, bipolar type and alcohol use disorder (associated with alcohol withdrawal seizures). Prior inpatient psychiatric hospitalizations, last known in Sept 2021 at [facility]. Previously psychiatric provider, Dr. Contreras from [id number] and most recently Miguelina STREETER CNP in August 2021. Presents to outside emergency department accompanied by law enforcement after being found disorganized twice in the community in same day, walking around naked. Patient was subsequently transferred and accepted for inpatient psychiatric hospitalization at [facility] for further safety and further stabilization     Daily Progress: court was continued until this upcoming Friday. No changes, olanzapine at 30 mg at bedtime        DIAGNOSTIC FORMULATION   #schizoaffective disorder, depressed type  #alcohol use disorder, remission status unknown  #Malnutrition related to inadequate energy/protein intake as evidenced by weight loss and loss of muscle and subcutaneous fat.     PLAN     Location: Unit 5  Legal Status: Orders Placed This Encounter      Voluntary    Safety Assessment:    Behavioral Orders   Procedures     Code 1 - Restrict to Unit     Routine Programming     As clinically indicated     Status 15     Every 15 minutes.      PTA medications held:   - none (not taking for extended period of time)     PTA medications continued/changed:   -none (nonadherent)     New medications initiated:   -olanzapine 10 mg at bedtime --> increase to 15 mg at bedtime (6/7/23) -> increase olanzapine to 20 mg at bedtime (6/9/23)    Today's Changes:  none    Programming: Patient will be treated in a therapeutic  milieu with appropriate individual and group therapies. Education will be provided on diagnoses, medications, and treatments. Encouraged behavioral activation and participation in group programming.     Medical diagnoses:  Per medicine    Disposition: pending clinical course        TREATMENT TEAM CARE PLAN     Progress: Continued symptoms.    Continued Stay Criteria/Rationale: Continued symptoms without sufficient improvement/resolution.    Medical/Physical: See above.    Precautions: See above.     Plan: Continue inpatient care with unit support and medication management.    Rationale for change in precautions or plan: NA due to no change.    Participants: Pedro Conde MD, Nursing, SW, OT.    The patient's care was discussed with the treatment team and chart notes were reviewed.       ATTESTATION    Pedro Conde MD  [facility]   Psychiatry    Video Visit: Patient has given verbal consent for video visit?: Yes  Type of Service: video visit for mental health treatment  Reason for Video Visit: COVID-19 [place]  Originating Site (patient location): [facility]  Distant Site (provider location): Remote Location  Mode of Communication: Video Conference via [email or web address]  Time of Service: Date: 06/21/2023 , Start: 09:30 end: 10:00

## 2023-06-21 NOTE — PLAN OF CARE
Problem: Adult Behavioral Health Plan of Care  Goal: Patient-Specific Goal (Individualization)  Description: Patient will attend 75% of group programming.   Patient will eat 75% of meals.   Patient will sleep 6-8 hours per night.   Pt will be agreeable to treatment team recommendations       Outcome: Progressing   Goal Outcome Evaluation:                       Face to face shift report received from RN. Rounding completed, pt observed.Client rested in room for 7 hours with eyes closed and respirations noted.Face to face report will be communicated to oncoming RN.    Matti Lorenzo RN  6/21/2023  6:41 AM

## 2023-06-22 PROCEDURE — 99232 SBSQ HOSP IP/OBS MODERATE 35: CPT | Mod: GT | Performed by: PSYCHIATRY & NEUROLOGY

## 2023-06-22 PROCEDURE — 124N000001 HC R&B MH

## 2023-06-22 PROCEDURE — 250N000013 HC RX MED GY IP 250 OP 250 PS 637: Performed by: PSYCHIATRY & NEUROLOGY

## 2023-06-22 RX ADMIN — LORAZEPAM 1 MG: 0.5 TABLET ORAL at 21:04

## 2023-06-22 RX ADMIN — LORAZEPAM 1 MG: 0.5 TABLET ORAL at 08:47

## 2023-06-22 RX ADMIN — OLANZAPINE 30 MG: 10 TABLET, ORALLY DISINTEGRATING ORAL at 21:04

## 2023-06-22 ASSESSMENT — ACTIVITIES OF DAILY LIVING (ADL)
ADLS_ACUITY_SCORE: 41

## 2023-06-22 NOTE — PLAN OF CARE
Face to face shift report received from nurse. Rounding completed, pt observed.    Problem: Adult Behavioral Health Plan of Care  Goal: Patient-Specific Goal (Individualization)  Description: Patient will attend 75% of group programming.   Patient will eat 75% of meals.   Patient will sleep 6-8 hours per night.   Pt will be agreeable to treatment team recommendations     Outcome: Progressing   Pt has been in bed with eyes closed and regular respirations observed all night. Will continue to monitor. Patient slept 7 hours no issues noted.     Face to face report will be communicated to oncoming RN.    Walter Keith RN  6/22/2023

## 2023-06-22 NOTE — PROGRESS NOTES
"CLINICAL NUTRITION SERVICES  -  REASSESSMENT NOTE    Andre Bustos :     48 yom admitted for suicidal ideation. Medical/psych hx includes schizoaffective disorder, bipolar type and alcohol use disorder. Intake is adequate. Weight trending up- 14lb gain in about 2 weeks.    Diet Order: Regular, double portions  Intake: 16 meals documented with 100% intake    Height: 5' 10\"  Weight: 126 lbs 9.6 oz  Body mass index is 18.17 kg/m .  Weight Status:  Underweight BMI <18.5  Weight History:   Wt Readings from Last 10 Encounters:   06/18/23 57.4 kg (126 lb 9.6 oz)   06/05/23 51 kg (112 lb 6.4 oz)- first measured weight   08/18/21 68.9 kg (151 lb 12.8 oz)      Weight used to calculate needs: 68.6kg ibw  Estimated Energy Needs: 4115-2420 kcals (30-35 Kcal/Kg)   Estimated Protein Needs:  grams protein (1-1.5 g pro/Kg)      Malnutrition Diagnosis: Severe malnutrition- improving with adequate intake and weight gain  In Context of:  Environmental or social circumstances     NUTRITION RECOMMENDATIONS  - Encourage intake with meals/snacks  - continue monitoring weight     MONITORING AND EVALUATION:  RD will monitor intake, weight, labs   "

## 2023-06-22 NOTE — PROGRESS NOTES
"  [facility] PSYCHIATRY  -  PROGRESS NOTE     ID   Name: Andre Bustos  MRN#: [id number]     SUBJECTIVE   Prior to interviewing the patient, I met with nursing and reviewed patient's clinical condition. We discussed clinical care both before and after the interview. I have reviewed the patient's clinical course by review of records including previous notes, labs, and vital signs.     Per nursing, the patient had the following behavioral events over the last 24-hours: isolative to room. Avoids most interactions    On psychiatric interview, Andre is met within his room. He states \"I will just wait until tomorrow\" referring to his court date.  He is upset that he has a roommate.      MEDICATIONS   Scheduled Meds:    LORazepam  1 mg Oral BID     OLANZapine zydis  30 mg Oral At Bedtime     PRN Meds:.acetaminophen, alum & mag hydroxide-simethicone, hydrOXYzine, melatonin, OLANZapine **OR** OLANZapine, polyethylene glycol     ALLERGIES   No Known Allergies     VITALS   Vitals: /66 (BP Location: Right arm)   Pulse 93   Temp 97.7  F (36.5  C) (Tympanic)   Resp 16   Ht 1.778 m (5' 10\")   Wt 57.4 kg (126 lb 9.6 oz)   SpO2 99%   BMI 18.17 kg/m       MENTAL STATUS EXAM   Appearance:  awake,disheveled  Attitude:  cooperative  Eye Contact:  Poor  Mood:  \"okay\"  Affect: flat  Speech:  mumbles  Psychomotor Behavior:  no evidence of tardive dyskinesia, dystonia, or tics  Thought Process: illogical  Associations:  no loose associations  Thought Content:  no evidence of suicidal ideation or homicidal ideation, paranoia prominent  Insight:  limited  Judgment:  limited  Oriented to: oriented to person and place only  Attention Span and Concentration:  impaired  Recent and Remote Memory:  impaired  Gait and Station: Normal        LABS   No results found for this or any previous visit (from the past 24 hour(s)).      ASSESSMENT   Andre Bustos is a 48 year old male with a past psychiatric history notable for " schizoaffective disorder, bipolar type and alcohol use disorder (associated with alcohol withdrawal seizures). Prior inpatient psychiatric hospitalizations, last known in Sept 2021 at [facility]. Previously psychiatric provider, Dr. Contreras from [id number] and most recently Miguelina STREETER CNP in August 2021. Presents to outside emergency department accompanied by law enforcement after being found disorganized twice in the community in same day, walking around naked. Patient was subsequently transferred and accepted for inpatient psychiatric hospitalization at [facility] for further safety and further stabilization     Daily Progress: court was continued until this upcoming Friday. No changes, olanzapine at 30 mg at bedtime        DIAGNOSTIC FORMULATION   #schizoaffective disorder, depressed type  #alcohol use disorder, remission status unknown  #Malnutrition related to inadequate energy/protein intake as evidenced by weight loss and loss of muscle and subcutaneous fat.     PLAN     Location: Unit 5  Legal Status: Orders Placed This Encounter      Voluntary    Safety Assessment:    Behavioral Orders   Procedures     Code 1 - Restrict to Unit     Routine Programming     As clinically indicated     Status 15     Every 15 minutes.      PTA medications held:   - none (not taking for extended period of time)     PTA medications continued/changed:   -none (nonadherent)     New medications initiated:   -titrating olanzapine    Today's Changes:  none    Programming: Patient will be treated in a therapeutic milieu with appropriate individual and group therapies. Education will be provided on diagnoses, medications, and treatments. Encouraged behavioral activation and participation in group programming.     Medical diagnoses:  Per medicine    Disposition: pending clinical course        TREATMENT TEAM CARE PLAN     Progress: Continued symptoms.    Continued Stay Criteria/Rationale:  Continued symptoms without sufficient improvement/resolution.    Medical/Physical: See above.    Precautions: See above.     Plan: Continue inpatient care with unit support and medication management.    Rationale for change in precautions or plan: NA due to no change.    Participants: Pedro Conde MD, Nursing, SW, OT.    The patient's care was discussed with the treatment team and chart notes were reviewed.       ATTESTATION    Pedro Conde MD  [facility]   Psychiatry    Video Visit: Patient has given verbal consent for video visit?: Yes  Type of Service: video visit for mental health treatment  Reason for Video Visit: COVID-19 [place]  Originating Site (patient location): [facility]  Distant Site (provider location): Remote Location  Mode of Communication: Video Conference via [employer]  Time of Service: Date: 06/22/2023 , Start: 09:30 end: 10:00

## 2023-06-22 NOTE — PLAN OF CARE
"Problem: Thought Process Alteration  Goal: Optimal Thought Clarity  Outcome: Progressing     Problem: Suicidal Behavior  Goal: Suicidal Behavior is Absent or Managed  Outcome: Progressing     Problem: Adult Behavioral Health Plan of Care  Goal: Patient-Specific Goal (Individualization)  Description: Patient will attend 75% of group programming.   Patient will eat 75% of meals.   Patient will sleep 6-8 hours per night.   Pt will be agreeable to treatment team recommendations   Outcome: Not Progressing     Face to face shift report received from Walter. Rounding completed, pt observed.    Patient was in his room all of morning shift and would come get him food and go back to his room and eat. He ate 100% of his breakfast & lunch. He took his morning medications as ordered and requested that staff \"leave me alone in my room till lunch\", reminded pt that someone would have to check on him every 15 mins. He denied depression, stated his anxiety was about the same as it has been. Denied SI/HI & hallucinations. He stated that today was \"not a good day\" when asked what was making it not a good day he stated, \" I don't know\". He did not go to any groups.     Care continued to evening shift.    Patient was on the phone during shift change, he requested to shave, staff supervised.     He came to the unge to grab his tray and ate dinner in his room, ate 100%. He remained in bed the remainder of the shift.     Patient took HS medications, writer believes he was confused about them though because he said he only takes the ativan in the morning, writer then explained that it was ordered twice daily and handed him the med cup in which he took them. Writer then proceeded to get him his disintegrating Zyprexa, he asked what it was, writer informed him, he then asked what he just took, he was informed that it was ativan, patient then stated that he only takes that in the morning and then took the Zyprexa.      Patient spent the " remainder of the shift in bed.     Face to face report will be communicated to oncoming RN.    Cece Messer RN  6/22/2023  10:46 PM

## 2023-06-23 PROCEDURE — 250N000013 HC RX MED GY IP 250 OP 250 PS 637: Performed by: PSYCHIATRY & NEUROLOGY

## 2023-06-23 PROCEDURE — 99232 SBSQ HOSP IP/OBS MODERATE 35: CPT | Mod: GT | Performed by: PSYCHIATRY & NEUROLOGY

## 2023-06-23 PROCEDURE — 124N000001 HC R&B MH

## 2023-06-23 PROCEDURE — 124N000004

## 2023-06-23 RX ORDER — QUETIAPINE FUMARATE 25 MG/1
25 TABLET, FILM COATED ORAL 2 TIMES DAILY
Status: DISCONTINUED | OUTPATIENT
Start: 2023-06-23 | End: 2023-06-29

## 2023-06-23 RX ORDER — QUETIAPINE FUMARATE 25 MG/1
25 TABLET, FILM COATED ORAL 2 TIMES DAILY
Status: DISCONTINUED | OUTPATIENT
Start: 2023-06-23 | End: 2023-06-23

## 2023-06-23 RX ADMIN — OLANZAPINE 30 MG: 10 TABLET, ORALLY DISINTEGRATING ORAL at 20:04

## 2023-06-23 RX ADMIN — QUETIAPINE FUMARATE 25 MG: 25 TABLET ORAL at 12:55

## 2023-06-23 RX ADMIN — LORAZEPAM 1 MG: 0.5 TABLET ORAL at 08:34

## 2023-06-23 ASSESSMENT — ACTIVITIES OF DAILY LIVING (ADL)
DRESS: INDEPENDENT;SCRUBS (BEHAVIORAL HEALTH)
ADLS_ACUITY_SCORE: 41
ORAL_HYGIENE: INDEPENDENT
ADLS_ACUITY_SCORE: 41
LAUNDRY: UNABLE TO COMPLETE
ADLS_ACUITY_SCORE: 41
HYGIENE/GROOMING: INDEPENDENT
ADLS_ACUITY_SCORE: 41

## 2023-06-23 NOTE — PLAN OF CARE
"Face to face shift report received from DAVID Ayala.       Problem: Adult Behavioral Health Plan of Care  Goal: Patient-Specific Goal (Individualization)  Description: Patient will attend 75% of group programming.   Patient will eat 75% of meals.   Patient will sleep 6-8 hours per night.   Pt will be agreeable to treatment team recommendations   Outcome: Progressing   Refused 1700 seroquel, stating \"I already took that today. I'm not taking that. I get 30mg of Zyprexa.\" Writer attempted to educate pt, but was dismissed.   Refused HS Ativan, stating \"I only take that in the morning.\"   Offers little during conversation. Dismissive towards writer. Withdrawn to room entire shift, except to coming to lounge to retrieve dinner tray. Flat affect. Reports \"I'm fine.\"     Problem: Thought Process Alteration  Goal: Optimal Thought Clarity  Outcome: Progressing       Problem: Suicidal Behavior  Goal: Suicidal Behavior is Absent or Managed  Outcome: Progressing   Free from self harm or injury this shift.     Face to face end of shift report to be communicated to oncoming RN.       "

## 2023-06-23 NOTE — PLAN OF CARE
RUPAL DAVIES RN  6/23/2023  7:39 AM  Face to face shift report received from DAVID Watson. Rounding completed, pt observed.     Pt denies anxiety, depression, SI, HI, hallucinations, and pain.  Flat affect and answers questions with short phrases.  Isolated and withdrawn to room this shift.  Pt refuses to go to groups.  Compliant with meds.       Problem: Adult Behavioral Health Plan of Care  Goal: Patient-Specific Goal (Individualization)  Description: Patient will attend 75% of group programming.   Patient will eat 75% of meals.   Patient will sleep 6-8 hours per night.   Pt will be agreeable to treatment team recommendations   Outcome: Progressing     Problem: Thought Process Alteration  Goal: Optimal Thought Clarity  Outcome: Progressing     Problem: Suicidal Behavior  Goal: Suicidal Behavior is Absent or Managed  Outcome: Progressing    Face to face end of shift report communicated to oncoming shift RN.

## 2023-06-23 NOTE — PROGRESS NOTES
"  [facility] PSYCHIATRY  -  PROGRESS NOTE     ID   Name: Andre Bustos  MRN#: [id number]     SUBJECTIVE   Prior to interviewing the patient, I met with nursing and reviewed patient's clinical condition. We discussed clinical care both before and after the interview. I have reviewed the patient's clinical course by review of records including previous notes, labs, and vital signs.     Per nursing, the patient had the following behavioral events over the last 24-hours: isolative to room. Avoids most interactions    On psychiatric interview, Andre is met within his room. He is aware court is this AM. He states he knows they are going to salomón him a commitment. When asked why, he state \"I just know\".  He is asked questions about where he was prior to admission and states \"it does not matter\".  He reports no difficulties and then states \"I will just stay here\" and walks away.      MEDICATIONS   Scheduled Meds:    LORazepam  1 mg Oral BID     OLANZapine zydis  30 mg Oral At Bedtime     PRN Meds:.acetaminophen, alum & mag hydroxide-simethicone, hydrOXYzine, melatonin, OLANZapine **OR** OLANZapine, polyethylene glycol     ALLERGIES   No Known Allergies     VITALS   Vitals: /93 (BP Location: Right arm)   Pulse 87   Temp 97.2  F (36.2  C) (Tympanic)   Resp 16   Ht 1.778 m (5' 10\")   Wt 57.4 kg (126 lb 9.6 oz)   SpO2 99%   BMI 18.17 kg/m       MENTAL STATUS EXAM   Appearance:  awake,disheveled  Attitude:  cooperative  Eye Contact:  Poor  Mood:  \"alright\"  Affect: flat  Speech:  mumbles  Psychomotor Behavior:  no evidence of tardive dyskinesia, dystonia, or tics  Thought Process: illogical  Associations:  no loose associations  Thought Content:  no evidence of suicidal ideation or homicidal ideation, paranoia prominent  Insight:  limited  Judgment:  limited  Oriented to: oriented to person and place only  Attention Span and Concentration:  impaired  Recent and Remote Memory:  impaired  Gait and Station: Normal "        LABS   No results found for this or any previous visit (from the past 24 hour(s)).      ASSESSMENT   Andre Bustos is a 48 year old male with a past psychiatric history notable for schizoaffective disorder, bipolar type and alcohol use disorder (associated with alcohol withdrawal seizures). Prior inpatient psychiatric hospitalizations, last known in Sept 2021 at [facility]. Previously psychiatric provider, Dr. Contreras from [id number] and most recently Miguelina STREETER CNP in August 2021. Presents to outside emergency department accompanied by law enforcement after being found disorganized twice in the community in same day, walking around naked. Patient was subsequently transferred and accepted for inpatient psychiatric hospitalization at [facility] for further safety and further stabilization     Daily Progress: court this AM.      DIAGNOSTIC FORMULATION   #schizoaffective disorder, depressed type  #alcohol use disorder, remission status unknown  #Malnutrition related to inadequate energy/protein intake as evidenced by weight loss and loss of muscle and subcutaneous fat.     PLAN     Location: Unit 5  Legal Status: Orders Placed This Encounter      Voluntary    Safety Assessment:    Behavioral Orders   Procedures     Code 1 - Restrict to Unit     Routine Programming     As clinically indicated     Status 15     Every 15 minutes.      PTA medications held:   - none (not taking for extended period of time)     PTA medications continued/changed:   -none (nonadherent)     New medications initiated:   -titrating olanzapine    Today's Changes:  none    Programming: Patient will be treated in a therapeutic milieu with appropriate individual and group therapies. Education will be provided on diagnoses, medications, and treatments. Encouraged behavioral activation and participation in group programming.     Medical diagnoses:  Per medicine    Disposition: pending clinical  course        TREATMENT TEAM CARE PLAN     Progress: Continued symptoms.    Continued Stay Criteria/Rationale: Continued symptoms without sufficient improvement/resolution.    Medical/Physical: See above.    Precautions: See above.     Plan: Continue inpatient care with unit support and medication management.    Rationale for change in precautions or plan: NA due to no change.    Participants: Pedro Conde MD, Nursing, SW, OT.    The patient's care was discussed with the treatment team and chart notes were reviewed.       ATTESTATION    Pedro Conde MD  [facility]   Psychiatry    Video Visit: Patient has given verbal consent for video visit?: Yes  Type of Service: video visit for mental health treatment  Reason for Video Visit: COVID-19 [place]  Originating Site (patient location): [facility]  Distant Site (provider location): Remote Location  Mode of Communication: Video Conference via [employer]  Time of Service: Date: 06/23/2023 , Start: 09:30 end: 10:00

## 2023-06-23 NOTE — PLAN OF CARE
Face to face shift report received from nurse. Rounding completed, pt observed.    Problem: Adult Behavioral Health Plan of Care  Goal: Patient-Specific Goal (Individualization)  Description: Patient will attend 75% of group programming.   Patient will eat 75% of meals.   Patient will sleep 6-8 hours per night.   Pt will be agreeable to treatment team recommendations   Outcome: Progressing   Pt has been in bed with eyes closed and regular respirations observed all night. Will continue to monitor. Patient slept 7 hours, no issues noted.    Face to face report will be communicated to oncoming RN.    Walter Keith RN  6/23/2023

## 2023-06-23 NOTE — PROGRESS NOTES
"      The patient had his commitment hearing this morning @ 8:30 am.     The  is taking the matter under advisement.     After court Andre asked, \" Why does my  request things I do not want?\"       CITLALLI spoke with the patient's  Todd Adair. Todd suggested SW to attempt to have the patient sign a DEBBIE for the patient's mom. He shared that she has a lot of back story to tell.     Patient mom's phone number is [phone] ( Katelyn Freitas).     CITLALLI asked the patient to sign an DEBBIE for his mom. The patient declined.     The patient's mom called and is now speaking to the patient via [facility] phone.         "

## 2023-06-24 PROCEDURE — 250N000013 HC RX MED GY IP 250 OP 250 PS 637: Performed by: PSYCHIATRY & NEUROLOGY

## 2023-06-24 PROCEDURE — 99232 SBSQ HOSP IP/OBS MODERATE 35: CPT | Mod: GT | Performed by: PSYCHIATRY & NEUROLOGY

## 2023-06-24 PROCEDURE — 124N000001 HC R&B MH

## 2023-06-24 PROCEDURE — 124N000004

## 2023-06-24 RX ADMIN — OLANZAPINE 30 MG: 10 TABLET, ORALLY DISINTEGRATING ORAL at 20:52

## 2023-06-24 RX ADMIN — LORAZEPAM 1 MG: 0.5 TABLET ORAL at 08:44

## 2023-06-24 ASSESSMENT — ACTIVITIES OF DAILY LIVING (ADL)
ADLS_ACUITY_SCORE: 41
ADLS_ACUITY_SCORE: 41
DRESS: INDEPENDENT;SCRUBS (BEHAVIORAL HEALTH)
ADLS_ACUITY_SCORE: 41
LAUNDRY: UNABLE TO COMPLETE
ADLS_ACUITY_SCORE: 41
ORAL_HYGIENE: INDEPENDENT
ADLS_ACUITY_SCORE: 41
ADLS_ACUITY_SCORE: 41
HYGIENE/GROOMING: INDEPENDENT
ADLS_ACUITY_SCORE: 41
ADLS_ACUITY_SCORE: 41

## 2023-06-24 NOTE — PROGRESS NOTES
"  [facility] PSYCHIATRY  -  PROGRESS NOTE     ID   Name: Andre Bustos  MRN#: [id number]     SUBJECTIVE   Prior to interviewing the patient, I met with nursing and reviewed patient's clinical condition. We discussed clinical care both before and after the interview. I have reviewed the patient's clinical course by review of records including previous notes, labs, and vital signs.     Per nursing, the patient had the following behavioral events over the last 24-hours: isolative to room. Avoids most interactions    On psychiatric interview, Andre is met within his room. He is much the same. He goes out of his way to avoid answering questions and states things like \"okay well maybe tomorrow\", \"I am doing fine, I have to go\". He does not wish to talk about what he talked about with his mom on the phone yesterday. When he is informed that she is concerned about his well being he states \"probably\".   Talked with him about the importance of medication adherence.       MEDICATIONS   Scheduled Meds:    LORazepam  1 mg Oral BID     OLANZapine zydis  30 mg Oral At Bedtime     QUEtiapine  25 mg Oral BID     PRN Meds:.acetaminophen, alum & mag hydroxide-simethicone, hydrOXYzine, melatonin, OLANZapine **OR** OLANZapine, polyethylene glycol     ALLERGIES   No Known Allergies     VITALS   Vitals: /75   Pulse 99   Temp 97.9  F (36.6  C) (Tympanic)   Resp 18   Ht 1.778 m (5' 10\")   Wt 57.4 kg (126 lb 9.6 oz)   SpO2 99%   BMI 18.17 kg/m       MENTAL STATUS EXAM   Appearance:  awake,disheveled  Attitude:  cooperative  Eye Contact:  Poor  Mood:  \"fine\"  Affect: flat  Speech:  mumbles  Psychomotor Behavior:  no evidence of tardive dyskinesia, dystonia, or tics  Thought Process: illogical  Associations:  no loose associations  Thought Content:  no evidence of suicidal ideation or homicidal ideation, paranoia prominent  Insight:  limited  Judgment:  limited  Oriented to: oriented to person and place only  Attention Span " and Concentration:  impaired  Recent and Remote Memory:  impaired  Gait and Station: Normal        LABS   No results found for this or any previous visit (from the past 24 hour(s)).      ASSESSMENT   Andre Bustos is a 48 year old male with a past psychiatric history notable for schizoaffective disorder, bipolar type and alcohol use disorder (associated with alcohol withdrawal seizures). Prior inpatient psychiatric hospitalizations, last known in Sept 2021 at [facility]. Previously psychiatric provider, Dr. Contreras from [phone] and most recently Miguelina STREETER CNP in August 2021. Presents to outside emergency department accompanied by law enforcement after being found disorganized twice in the community in same day, walking around naked. Patient was subsequently transferred and accepted for inpatient psychiatric hospitalization at [facility] for further safety and further stabilization     Daily Progress: add Seroquel. He has been on dual antipsychotic therapy in the past. Benefits outweigh risks as he is making limited progress with high doses of olanzapine.      DIAGNOSTIC FORMULATION   #schizoaffective disorder, depressed type  #alcohol use disorder, remission status unknown  #Malnutrition related to inadequate energy/protein intake as evidenced by weight loss and loss of muscle and subcutaneous fat.     PLAN     Location: Unit [facility]  Legal Status: Orders Placed This Encounter      Voluntary    Safety Assessment:    Behavioral Orders   Procedures     Code 1 - Restrict to Unit     Routine Programming     As clinically indicated     Status 15     Every 15 minutes.      PTA medications held:   - none (not taking for extended period of time)     PTA medications continued/changed:   -none (nonadherent)     New medications initiated:   -titrating olanzapine  -ativan 1 mg BID    Today's Changes:  seroquel 25 mg BID    Programming: Patient will be treated in a therapeutic milieu  with appropriate individual and group therapies. Education will be provided on diagnoses, medications, and treatments. Encouraged behavioral activation and participation in group programming.     Medical diagnoses:  Per medicine    Disposition: pending clinical course        TREATMENT TEAM CARE PLAN     Progress: Continued symptoms.    Continued Stay Criteria/Rationale: Continued symptoms without sufficient improvement/resolution.    Medical/Physical: See above.    Precautions: See above.     Plan: Continue inpatient care with unit support and medication management.    Rationale for change in precautions or plan: NA due to no change.    Participants: Pedro Conde MD, Nursing, SW, OT.    The patient's care was discussed with the treatment team and chart notes were reviewed.       ATTESTATION    Perdo Conde MD  [facility]   Psychiatry    Video Visit: Patient has given verbal consent for video visit?: Yes  Type of Service: video visit for mental health treatment  Reason for Video Visit: COVID-19 [place]  Originating Site (patient location): [facility]  Distant Site (provider location): Remote Location  Mode of Communication: Video Conference via [employer]  Time of Service: Date: 06/24/2023 , Start: 09:30 end: 10:00

## 2023-06-24 NOTE — PLAN OF CARE
Problem: Suicidal Behavior  Goal: Suicidal Behavior is Absent or Managed  Outcome: Progressing     Problem: Thought Process Alteration  Goal: Optimal Thought Clarity  Outcome: Progressing     Problem: Adult Behavioral Health Plan of Care  Goal: Patient-Specific Goal (Individualization)  Description: Patient will attend 75% of group programming.   Patient will eat 75% of meals.   Patient will sleep 6-8 hours per night.   Pt will be agreeable to treatment team recommendations   Outcome: Progressing     Face to Face report received from DAVID Spann.  Rounding completed. Patient observed in bed with eyes closed and regular respirations observed all night. Will continue to monitor. Patient slept 7 hours, no falls or self-harm noted.    Patient talked at length about his tremor and that he had it under control for 7 months when he was exercising and not drinking.  He tells this writer he had a total right hip replacement last March and prior to that he couldn't exercise due to the pain & that's when he started drinking again.  He looks forward to getting outside for exercise and knows he will never drink again.      Face to face end of shift report communicated to [facility] day shift RN.     Jerri Angeles RN  6/24/2023  4:03 AM

## 2023-06-24 NOTE — PLAN OF CARE
"Face to face report received from Jerri RN. Pt. Observed.     Problem: Adult Behavioral Health Plan of Care  Goal: Patient-Specific Goal (Individualization)  Description: Patient will attend 75% of group programming.   Patient will eat 75% of meals.   Patient will sleep 6-8 hours per night.   Pt will be agreeable to treatment team recommendations   Outcome: Progressing      Pt. Denies HI, SI, anxiety depression, hallucinations and pain. Pt. In agreement to update staff to thoughts feelings of wanting to harm self or others. Pt. Cooperative with nursing assessment, refusing Scheduled Seroquel \"I take it in the evening.\" Pt. Isolating to room. Pt. Encouraged to attend unit programing and shower. Pt. In agreement to attend unit programing. Pt. Eating WDL. Pt. Denies any issues with bowel and bladder.     Face to face end of shift report communicated to oncoming RN.     Marie Teresa RN  6/24/2023         "

## 2023-06-25 PROCEDURE — 250N000013 HC RX MED GY IP 250 OP 250 PS 637: Performed by: PSYCHIATRY & NEUROLOGY

## 2023-06-25 PROCEDURE — 124N000001 HC R&B MH

## 2023-06-25 PROCEDURE — 124N000004

## 2023-06-25 RX ADMIN — LORAZEPAM 1 MG: 0.5 TABLET ORAL at 09:18

## 2023-06-25 RX ADMIN — OLANZAPINE 30 MG: 10 TABLET, ORALLY DISINTEGRATING ORAL at 20:01

## 2023-06-25 ASSESSMENT — ACTIVITIES OF DAILY LIVING (ADL)
LAUNDRY: UNABLE TO COMPLETE
HYGIENE/GROOMING: INDEPENDENT
ADLS_ACUITY_SCORE: 41
ADLS_ACUITY_SCORE: 41
DRESS: INDEPENDENT;SCRUBS (BEHAVIORAL HEALTH)
ADLS_ACUITY_SCORE: 41
ORAL_HYGIENE: INDEPENDENT
ADLS_ACUITY_SCORE: 41

## 2023-06-25 NOTE — PLAN OF CARE
Problem: Suicidal Behavior  Goal: Suicidal Behavior is Absent or Managed  Outcome: Progressing     Problem: Thought Process Alteration  Goal: Optimal Thought Clarity  Outcome: Progressing     Problem: Adult Behavioral Health Plan of Care  Goal: Patient-Specific Goal (Individualization)  Description: Patient will attend 75% of group programming.   Patient will eat 75% of meals.   Patient will sleep 6-8 hours per night.   Pt will be agreeable to treatment team recommendations     Outcome: Progressing      Pt has been in bed with eyes closed and regular respirations observed all night. Will continue to monitor. Patient slept 7 hours.  Patient was free from falls and self-harm during NOC.    Face to face end of shift [facility]     Jerri Angeles RN  6/25/2023  6:22 AM

## 2023-06-25 NOTE — PLAN OF CARE
"Face to face report received from Jerri HERNANDEZ. Pt. Observed.     Problem: Adult Behavioral Health Plan of Care  Goal: Patient-Specific Goal (Individualization)  Description: Patient will attend 75% of group programming.   Patient will eat 75% of meals.   Patient will sleep 6-8 hours per night.   Pt will be agreeable to treatment team recommendations       Outcome: Progressing  Pt. Denies HI, SI, anxiety, hallucinations, and pain. Pt. Reports \"I have depression everywhere. It never goes away. The Zyprexa at night helps.\" Pt. Reports he would like to take Zyprexa at HS. Pt. In agreement to update staff to thoughts feelings of wanting to harm self or others. Pt. Cooperative with nursing assessment, refusing Scheduled Seroquel. Pt. Isolating to room. Pt. Encouraged to attend unit programing and shower. Pt. In agreement to attend unit programing. Pt. Eating WDL. Pt. Denies any issues with bowel and bladder.      Face to face end of shift report communicated to oncoming RN.     Marie Teresa RN  6/25/2023  3:06 PM       "

## 2023-06-25 NOTE — PLAN OF CARE
"  Problem: Adult Behavioral Health Plan of Care  Goal: Patient-Specific Goal (Individualization)  Description: Patient will attend 75% of group programming.   Patient will eat 75% of meals.   Patient will sleep 6-8 hours per night.   Pt will be agreeable to treatment team recommendations       6/24/2023 2254 by Sue Zabala, RN  Note: Patient is withdrawn/isolative to bedroom for majority of shift. Appears very guarded with minimal eye contact during assessment attempts. Does not directly answer most questions and appears to be trying to walk away at times. Refuses scheduled Seroquel at dinner time and initially states \"I don't take that right now. I will take a Zyprexa\" but then quickly changes his mind and states, \"no I don't need anything\". Laying in bed awake all evening. Appears to be responding to internal stimuli at times but denies this when asked.   Patient also refuses scheduled Ativan at HS stating, \"I don't take that right now. I take that in the morning\". Was cooperative with scheduled Zyprexa.   Minimal requests all shift. Laying in bed with eyes closed towards end of shift. Continue to monitor at this time.      Problem: Thought Process Alteration  Goal: Optimal Thought Clarity  6/24/2023 2254 by Sue Zabala, RN  Note: Continue to monitor at this time.      Problem: Suicidal Behavior  Goal: Suicidal Behavior is Absent or Managed  Note: Continue to monitor at this time.     Face to face end of shift report communicated to oncoming RN.     Sue Zabala, RN  6/24/2023  11:04 PM         "

## 2023-06-25 NOTE — PLAN OF CARE
"  Problem: Adult Behavioral Health Plan of Care  Goal: Patient-Specific Goal (Individualization)  Description: Patient will attend 75% of group programming.   Patient will eat 75% of meals.   Patient will sleep 6-8 hours per night.   Pt will be agreeable to treatment team recommendations       Note: Patient continues to be isolative/withdrawn to bedroom for majority of shift. Affect is flat, with darting eye contact during interactions. Does not provide much during assessment attempts, stating \"everything is acceptable right now\".   Refused scheduled Seroquel at dinner stating, \"I'll take that Zyprexa tonight, that seems to work good.\" When asked to elaborate, patient states, \"I sleep good with that. There is no need for the other medications during the day\".   Continues to decline numerous group encouragements. Also refuses shower encouragements stating, \"I will when I need to\".  Refuses scheduled Ativan at HS but cooperative with Zyprexa. Continues laying in bed and appears to be sleeping towards end of shift. Continue to monitor at this time.      Problem: Thought Process Alteration  Goal: Optimal Thought Clarity  Note: Continue to monitor at this time.      Problem: Suicidal Behavior  Goal: Suicidal Behavior is Absent or Managed  Note: Continue to monitor at this time.    Face to face end of shift report communicated to oncoming RN.     Sue Zabala RN  6/25/2023  5:57 PM       "

## 2023-06-26 PROCEDURE — 124N000004

## 2023-06-26 PROCEDURE — 99232 SBSQ HOSP IP/OBS MODERATE 35: CPT | Mod: GT | Performed by: PSYCHIATRY & NEUROLOGY

## 2023-06-26 PROCEDURE — 250N000013 HC RX MED GY IP 250 OP 250 PS 637: Performed by: PSYCHIATRY & NEUROLOGY

## 2023-06-26 RX ADMIN — OLANZAPINE 30 MG: 10 TABLET, ORALLY DISINTEGRATING ORAL at 20:18

## 2023-06-26 RX ADMIN — LORAZEPAM 1 MG: 0.5 TABLET ORAL at 08:44

## 2023-06-26 ASSESSMENT — ACTIVITIES OF DAILY LIVING (ADL)
ADLS_ACUITY_SCORE: 51
HYGIENE/GROOMING: PROMPTS
ADLS_ACUITY_SCORE: 41
HYGIENE/GROOMING: PROMPTS
ADLS_ACUITY_SCORE: 51
ADLS_ACUITY_SCORE: 51
ADLS_ACUITY_SCORE: 41
DRESS: SCRUBS (BEHAVIORAL HEALTH)
LAUNDRY: UNABLE TO COMPLETE
ADLS_ACUITY_SCORE: 41
ADLS_ACUITY_SCORE: 51
ADLS_ACUITY_SCORE: 41
ADLS_ACUITY_SCORE: 41
ORAL_HYGIENE: PROMPTS
ADLS_ACUITY_SCORE: 41

## 2023-06-26 NOTE — PROGRESS NOTES
The patient received his commitment paper work. He is committed [facility]. Patient received a copy of his court paper work and order.

## 2023-06-26 NOTE — PLAN OF CARE
Problem: Thought Process Alteration  Goal: Optimal Thought Clarity  Outcome: Progressing     Problem: Adult Behavioral Health Plan of Care  Goal: Patient-Specific Goal (Individualization)  Description: Patient will attend 75% of group programming.   Patient will eat 75% of meals.   Patient will sleep 6-8 hours per night.   Pt will be agreeable to treatment team recommendations       Outcome: Progressing       Patient has been isolative and withdrawn to room all shift.  He does talk with nursing staff when approached.  He refused his morning seroquel but agreed to take the ativan.  Does admit to hearing voices but they are chronic in nature and not severe.  Ate 75% of meals.  No complaints of pain.  VS WNL.  Face to face end of shift report communicated to evening shift RN.     Alise Valdez RN  6/26/2023  1:49 PM

## 2023-06-26 NOTE — PROGRESS NOTES
"  [facility] PSYCHIATRY  -  PROGRESS NOTE     ID   Name: Andre Bustos  MRN#: [id number]     SUBJECTIVE   Prior to interviewing the patient, I met with nursing and reviewed patient's clinical condition. We discussed clinical care both before and after the interview. I have reviewed the patient's clinical course by review of records including previous notes, labs, and vital signs.     Per nursing, the patient had the following behavioral events over the last 24-hours: more talkative, still prefers to isolate to his room     On psychiatric interview, Andre is met within his room. He states he is \"okay I guess\".  He asks good questions about his commitment today. Discussed rationale for commitment and he states \"what happened happened\".  He was encouraged to take his medications as prescribed and encouraged to spend time out of his room.      MEDICATIONS   Scheduled Meds:    LORazepam  1 mg Oral BID     OLANZapine zydis  30 mg Oral At Bedtime     QUEtiapine  25 mg Oral BID     PRN Meds:.acetaminophen, alum & mag hydroxide-simethicone, hydrOXYzine, melatonin, OLANZapine **OR** OLANZapine, polyethylene glycol     ALLERGIES   No Known Allergies     VITALS   Vitals: BP 98/63   Pulse 90   Temp 98.4  F (36.9  C) (Tympanic)   Resp 16   Ht 1.778 m (5' 10\")   Wt 57.4 kg (126 lb 9.6 oz)   SpO2 98%   BMI 18.17 kg/m       MENTAL STATUS EXAM   Appearance:  awake,disheveled  Attitude:  cooperative  Eye Contact:  Poor  Mood:  \"okay I guess\"  Affect: flat  Speech:  mumbles  Psychomotor Behavior:  no evidence of tardive dyskinesia, dystonia, or tics  Thought Process: illogical  Associations:  no loose associations  Thought Content:  no evidence of suicidal ideation or homicidal ideation, paranoia prominent  Insight:  limited  Judgment:  limited  Oriented to: oriented to person and place only  Attention Span and Concentration:  impaired  Recent and Remote Memory:  impaired  Gait and Station: Normal        LABS   No results " found for this or any previous visit (from the past 24 hour(s)).      ASSESSMENT   Andre Bustos is a 48 year old male with a past psychiatric history notable for schizoaffective disorder, bipolar type and alcohol use disorder (associated with alcohol withdrawal seizures). Prior inpatient psychiatric hospitalizations, last known in Sept 2021 at [facility]. Previously psychiatric provider, Dr. Contreras from [id number] and most recently Miguelina STREETER CNP in August 2021. Presents to outside emergency department accompanied by law enforcement after being found disorganized twice in the community in same day, walking around naked. Patient was subsequently transferred and accepted for inpatient psychiatric hospitalization at [facility] for further safety and further stabilization     Daily Progress: he is slowly becoming more talkative and less paranoid of staffs intentions.      DIAGNOSTIC FORMULATION   #schizoaffective disorder, depressed type  #alcohol use disorder, remission status unknown  #Malnutrition related to inadequate energy/protein intake as evidenced by weight loss and loss of muscle and subcutaneous fat.     PLAN     Location: Unit 5  Legal Status: Orders Placed This Encounter      Voluntary    Safety Assessment:    Behavioral Orders   Procedures     Code 1 - Restrict to Unit     Routine Programming     As clinically indicated     Status 15     Every 15 minutes.      PTA medications held:   - none (not taking for extended period of time)     PTA medications continued/changed:   -none (nonadherent)     New medications initiated:   -titrating olanzapine  -ativan 1 mg BID    Today's Changes:  No changes, encourage medication adherence    Programming: Patient will be treated in a therapeutic milieu with appropriate individual and group therapies. Education will be provided on diagnoses, medications, and treatments. Encouraged behavioral activation and participation in  group programming.     Medical diagnoses:  Per medicine    Disposition: pending clinical course        TREATMENT TEAM CARE PLAN     Progress: Continued symptoms.    Continued Stay Criteria/Rationale: Continued symptoms without sufficient improvement/resolution.    Medical/Physical: See above.    Precautions: See above.     Plan: Continue inpatient care with unit support and medication management.    Rationale for change in precautions or plan: NA due to no change.    Participants: Pedro Conde MD, Nursing, SW, OT.    The patient's care was discussed with the treatment team and chart notes were reviewed.       ATTESTATION    Pedro Conde MD  [facility]   Psychiatry    Video Visit: Patient has given verbal consent for video visit?: Yes  Type of Service: video visit for mental health treatment  Reason for Video Visit: COVID-19 [place]  Originating Site (patient location): [facility]  Distant Site (provider location): Remote Location  Mode of Communication: Video Conference via [employer]  Time of Service: Date: 06/26/2023 , Start: 09:30 end: 10:00

## 2023-06-26 NOTE — PROGRESS NOTES
Problem: Suicidal Behavior  Goal: Suicidal Behavior is Absent or Managed  Outcome: Progressing     Problem: Thought Process Alteration  Goal: Optimal Thought Clarity  Outcome: Progressing     Problem: Adult Behavioral Health Plan of Care  Goal: Patient-Specific Goal (Individualization)  Description: Patient will attend 75% of group programming.   Patient will eat 75% of meals.   Patient will sleep 6-8 hours per night.   Pt will be agreeable to treatment team recommendations      Outcome: Progressing     Face to Face report received from DAVID Rogers.  Rounding completed. Patient observed. Pt has been in bed with eyes closed and regular respirations observed all night. Patient slept 7 hours.  Patient was free from falls and self-harm during NOC.  Will continue to monitor.     Face to face end of shift report communicated to [facility] day shift RN.     Jerri Angeles RN  6/26/2023  3:28 AM

## 2023-06-27 PROCEDURE — 99232 SBSQ HOSP IP/OBS MODERATE 35: CPT | Mod: GT | Performed by: PSYCHIATRY & NEUROLOGY

## 2023-06-27 PROCEDURE — 124N000004

## 2023-06-27 PROCEDURE — 250N000013 HC RX MED GY IP 250 OP 250 PS 637: Performed by: PSYCHIATRY & NEUROLOGY

## 2023-06-27 RX ADMIN — OLANZAPINE 30 MG: 10 TABLET, ORALLY DISINTEGRATING ORAL at 20:11

## 2023-06-27 RX ADMIN — LORAZEPAM 1 MG: 0.5 TABLET ORAL at 08:45

## 2023-06-27 ASSESSMENT — ACTIVITIES OF DAILY LIVING (ADL)
ADLS_ACUITY_SCORE: 51
LAUNDRY: UNABLE TO COMPLETE
ADLS_ACUITY_SCORE: 41
ADLS_ACUITY_SCORE: 51
ADLS_ACUITY_SCORE: 41
ADLS_ACUITY_SCORE: 51
HYGIENE/GROOMING: INDEPENDENT
ADLS_ACUITY_SCORE: 51
ADLS_ACUITY_SCORE: 41
ADLS_ACUITY_SCORE: 51
DRESS: SCRUBS (BEHAVIORAL HEALTH)
ORAL_HYGIENE: PROMPTS
ADLS_ACUITY_SCORE: 51
HYGIENE/GROOMING: PROMPTS
ORAL_HYGIENE: INDEPENDENT
ADLS_ACUITY_SCORE: 51
DRESS: SCRUBS (BEHAVIORAL HEALTH);INDEPENDENT
ADLS_ACUITY_SCORE: 51
ADLS_ACUITY_SCORE: 41

## 2023-06-27 NOTE — PLAN OF CARE
Problem: Suicidal Behavior  Goal: Suicidal Behavior is Absent or Managed  Outcome: Progressing     Problem: Thought Process Alteration  Goal: Optimal Thought Clarity  Outcome: Progressing     Problem: Adult Behavioral Health Plan of Care  Goal: Patient-Specific Goal (Individualization)  Description: Patient will attend 75% of group programming.   Patient will eat 75% of meals.   Patient will sleep 6-8 hours per night.   Pt will be agreeable to treatment team recommendations   Outcome: Progressing     Face to Face report received from Day TAYLOR RN.  Rounding completed. Patient observed. Pt has been in bed with eyes closed and regular respirations observed all night. Patient slept 7 hours.  Patient was free from falls and self-harm during NOC.  Will continue to monitor.     Face to face [facility]     Jerri Angeles RN  6/27/2023  3:13 AM

## 2023-06-27 NOTE — PLAN OF CARE
"Problem: Adult Behavioral Health Plan of Care  Goal: Patient-Specific Goal (Individualization)  Description: Patient will attend 75% of group programming.   Patient will eat 75% of meals.   Patient will sleep 6-8 hours per night.   Pt will be agreeable to treatment team recommendations   Outcome: Progressing  Note: Face to face end of shift report received from Alise ODEN RN. Rounding completed. Patient observed resting in bed, awake.     Day Paige RN  6/27/2023  4:18 PM      Pt had a flat affect and monotone voice. Pt endorsed anxiety and depression. He denied AVH, HI, and SI. Pt isolative and withdrawn to his room. He ate 75% of supper tonight. He refused his scheduled Seroquel. He stated \"I'll just have that Zyprexa around 8 or 9.\" Pt refused his scheduled bedtime Ativan.     Face to face end of shift report communicated to oncoming RN.     Problem: Thought Process Alteration  Goal: Optimal Thought Clarity  Outcome: Not Progressing  Note: Pt guarded in conversation. Dismissive at times.     Problem: Suicidal Behavior  Goal: Suicidal Behavior is Absent or Managed  Outcome: Progressing  Note: Pt denied suicidal ideation. He remained free from self harm.      Goal Outcome Evaluation:    Plan of Care Reviewed With: patient        "

## 2023-06-27 NOTE — PLAN OF CARE
Problem: Thought Process Alteration  Goal: Optimal Thought Clarity  Outcome: Progressing     Problem: Adult Behavioral Health Plan of Care  Goal: Patient-Specific Goal (Individualization)  Description: Patient will attend 75% of group programming.   Patient will eat 75% of meals.   Patient will sleep 6-8 hours per night.   Pt will be agreeable to treatment team recommendations       Outcome: Progressing     Patient isolative and withdrawn to his room much of the day.  Too morning ativan but refused seroquel again.  Dismissive of staff but does answer questions with yes or no.  Came out to the lounge briefly and did take a shower this shift.  Affect is flat and blunted.  No complaints of pain.  Vs WNL.  Face to face end of shift report communicated to evening shift RN.     Alise Valdez RN  6/27/2023  2:00 PM

## 2023-06-27 NOTE — PROGRESS NOTES
"  [facility] PSYCHIATRY  -  PROGRESS NOTE     ID   Name: Andre Bustos  MRN#: [id number]     SUBJECTIVE   Prior to interviewing the patient, I met with nursing and reviewed patient's clinical condition. We discussed clinical care both before and after the interview. I have reviewed the patient's clinical course by review of records including previous notes, labs, and vital signs.     Per nursing, the patient had the following behavioral events over the last 24-hours: none.     On psychiatric interview, Andre is met within his room. He states \"what is going to happen here\".  We discussed that he did receive a full commitment.  When asking how he is feeling, he states \"I dunno\".  He is made aware that the treatment team is looking for an IRTS.  He denies any physical pain. He is encouraged to go to at least 1 group a day.       MEDICATIONS   Scheduled Meds:    LORazepam  1 mg Oral BID     OLANZapine zydis  30 mg Oral At Bedtime     QUEtiapine  25 mg Oral BID     PRN Meds:.acetaminophen, alum & mag hydroxide-simethicone, hydrOXYzine, melatonin, OLANZapine **OR** OLANZapine, polyethylene glycol     ALLERGIES   No Known Allergies     VITALS   Vitals: /82 (BP Location: Left arm)   Pulse 86   Temp 97.4  F (36.3  C) (Tympanic)   Resp 16   Ht 1.778 m (5' 10\")   Wt 57.4 kg (126 lb 9.6 oz)   SpO2 99%   BMI 18.17 kg/m       MENTAL STATUS EXAM   Appearance:  awake,disheveled  Attitude:  cooperative  Eye Contact:  Poor  Mood:  \"fine\"  Affect: flat  Speech:  mumbles  Psychomotor Behavior:  no evidence of tardive dyskinesia, dystonia, or tics  Thought Process: illogical  Associations:  no loose associations  Thought Content:  no evidence of suicidal ideation or homicidal ideation, paranoia prominent  Insight:  limited  Judgment:  limited  Oriented to: oriented to person and place only  Attention Span and Concentration:  impaired  Recent and Remote Memory:  impaired  Gait and Station: Normal        LABS   No results " found for this or any previous visit (from the past 24 hour(s)).      ASSESSMENT   Andre Bustos is a 48 year old male with a past psychiatric history notable for schizoaffective disorder, bipolar type and alcohol use disorder (associated with alcohol withdrawal seizures). Prior inpatient psychiatric hospitalizations, last known in Sept 2021 at [facility]. Previously psychiatric provider, Dr. Contreras from [phone] and most recently Miguelina STREETER CNP in August 2021. Presents to outside emergency department accompanied by law enforcement after being found disorganized twice in the community in same day, walking around naked. Patient was subsequently transferred and accepted for inpatient psychiatric hospitalization at [facility] for further safety and further stabilization     Daily Progress: received full commitment. We are exploring dispositions     DIAGNOSTIC FORMULATION   #schizoaffective disorder, depressed type  #alcohol use disorder, remission status unknown  #Malnutrition related to inadequate energy/protein intake as evidenced by weight loss and loss of muscle and subcutaneous fat.     PLAN     Location: Unit 5  Legal Status: Orders Placed This Encounter      Voluntary    Safety Assessment:    Behavioral Orders   Procedures     Code 1 - Restrict to Unit     Routine Programming     As clinically indicated     Status 15     Every 15 minutes.      PTA medications held:   - none (not taking for extended period of time)     PTA medications continued/changed:   -none (nonadherent)     New medications initiated:   -titrating olanzapine  -ativan 1 mg BID    Today's Changes:  No changes, encourage medication adherence    Programming: Patient will be treated in a therapeutic milieu with appropriate individual and group therapies. Education will be provided on diagnoses, medications, and treatments. Encouraged behavioral activation and participation in group programming.      Medical diagnoses:  Per medicine    Disposition: pending clinical course        TREATMENT TEAM CARE PLAN     Progress: Continued symptoms.    Continued Stay Criteria/Rationale: Continued symptoms without sufficient improvement/resolution.    Medical/Physical: See above.    Precautions: See above.     Plan: Continue inpatient care with unit support and medication management.    Rationale for change in precautions or plan: NA due to no change.    Participants: Pedro Conde MD, Nursing, SW, OT.    The patient's care was discussed with the treatment team and chart notes were reviewed.       ATTESTATION    Pedro Conde MD  [facility]   Psychiatry    Video Visit: Patient has given verbal consent for video visit?: Yes  Type of Service: video visit for mental health treatment  Reason for Video Visit: COVID-19 [place]  Originating Site (patient location): [facility]  Distant Site (provider location): Remote Location  Mode of Communication: Video Conference via [employer]  Time of Service: Date: 06/27/2023 , Start:08:00 end: 08:30

## 2023-06-27 NOTE — PLAN OF CARE
"Problem: Adult Behavioral Health Plan of Care  Goal: Patient-Specific Goal (Individualization)  Description: Patient will attend 75% of group programming.   Patient will eat 75% of meals.   Patient will sleep 6-8 hours per night.   Pt will be agreeable to treatment team recommendations   Outcome: Not Progressing  Note: Pt had a flat affect. He has been isolative and withdrawn to his room all shift. He endorsed anxiety and depression. He denied hallucinations, HI, and SI. He was guarded in conversation. Pt ate 100% of supper. Pt refused his scheduled Seroquel tonight. He stated \"I take Zyprexa later.\" Pt also refused his scheduled Ativan at bedtime. He stated \"I take that in the morning.\" Pt not receptive to medication education. Pt was encouraged to shower, but he declined. Rash noted to pt's face.     Face to face end of shift report communicated to oncoming RN.      Problem: Thought Process Alteration  Goal: Optimal Thought Clarity  Outcome: Not Progressing     Problem: Suicidal Behavior  Goal: Suicidal Behavior is Absent or Managed  Outcome: Progressing  Note: Pt denied suicidal ideation. He remained free from self harm.      Goal Outcome Evaluation:    Plan of Care Reviewed With: patient      "

## 2023-06-28 PROCEDURE — 99232 SBSQ HOSP IP/OBS MODERATE 35: CPT | Mod: GT | Performed by: PSYCHIATRY & NEUROLOGY

## 2023-06-28 PROCEDURE — 124N000004

## 2023-06-28 PROCEDURE — 250N000013 HC RX MED GY IP 250 OP 250 PS 637: Performed by: PSYCHIATRY & NEUROLOGY

## 2023-06-28 RX ADMIN — QUETIAPINE FUMARATE 25 MG: 25 TABLET ORAL at 17:44

## 2023-06-28 RX ADMIN — LORAZEPAM 1 MG: 0.5 TABLET ORAL at 08:41

## 2023-06-28 RX ADMIN — OLANZAPINE 30 MG: 10 TABLET, ORALLY DISINTEGRATING ORAL at 20:09

## 2023-06-28 RX ADMIN — QUETIAPINE FUMARATE 25 MG: 25 TABLET ORAL at 08:40

## 2023-06-28 RX ADMIN — LORAZEPAM 1 MG: 0.5 TABLET ORAL at 20:09

## 2023-06-28 ASSESSMENT — ACTIVITIES OF DAILY LIVING (ADL)
DRESS: SCRUBS (BEHAVIORAL HEALTH)
DRESS: SCRUBS (BEHAVIORAL HEALTH)
HYGIENE/GROOMING: INDEPENDENT
ADLS_ACUITY_SCORE: 41
HYGIENE/GROOMING: INDEPENDENT
ORAL_HYGIENE: INDEPENDENT
LAUNDRY: UNABLE TO COMPLETE
ORAL_HYGIENE: INDEPENDENT
ADLS_ACUITY_SCORE: 41
LAUNDRY: UNABLE TO COMPLETE
ADLS_ACUITY_SCORE: 41

## 2023-06-28 NOTE — PROGRESS NOTES
"  [facility] PSYCHIATRY  -  PROGRESS NOTE     ID   Name: Andre Bustos  MRN#: [id number]     SUBJECTIVE   Prior to interviewing the patient, I met with nursing and reviewed patient's clinical condition. We discussed clinical care both before and after the interview. I have reviewed the patient's clinical course by review of records including previous notes, labs, and vital signs.     Per nursing, the patient had the following behavioral events over the last 24-hours: showered, isolative to room    On psychiatric interview, Andre is met within his room. He states \"so what is the plan\". He is not able to reiterate what we discussed yesterday and whether or not he is on commitment.  When I ask him what the outcome of the commitment is, he states\"I do not know\".   We reviewed his commitment and what is needed for further stabilization. He states \"okay well I am going to eat breakfast and maybe go to a group\".  He was commended on his efforts to get out of his room.      MEDICATIONS   Scheduled Meds:    LORazepam  1 mg Oral BID     OLANZapine zydis  30 mg Oral At Bedtime     QUEtiapine  25 mg Oral BID     PRN Meds:.acetaminophen, alum & mag hydroxide-simethicone, hydrOXYzine, melatonin, OLANZapine **OR** OLANZapine, polyethylene glycol     ALLERGIES   No Known Allergies     VITALS   Vitals: /71 (BP Location: Right arm)   Pulse 92   Temp 97.5  F (36.4  C) (Tympanic)   Resp 16   Ht 1.778 m (5' 10\")   Wt 57.4 kg (126 lb 9.6 oz)   SpO2 99%   BMI 18.17 kg/m       MENTAL STATUS EXAM   Appearance:  awake,disheveled  Attitude:  cooperative  Eye Contact:  Poor  Mood:  \"okay\"  Affect: flat  Speech:  mumbles  Psychomotor Behavior:  no evidence of tardive dyskinesia, dystonia, or tics  Thought Process: illogical  Associations:  no loose associations  Thought Content:  no evidence of suicidal ideation or homicidal ideation, paranoia prominent  Insight:  limited  Judgment:  limited  Oriented to: oriented to person and " place only  Attention Span and Concentration:  impaired  Recent and Remote Memory:  impaired  Gait and Station: Normal        LABS   No results found for this or any previous visit (from the past 24 hour(s)).      ASSESSMENT   Andre Bustos is a 48 year old male with a past psychiatric history notable for schizoaffective disorder, bipolar type and alcohol use disorder (associated with alcohol withdrawal seizures). Prior inpatient psychiatric hospitalizations, last known in Sept 2021 at [facility]. Previously psychiatric provider, Dr. Contreras from [phone] and most recently Miguelina STREETER CNP in August 2021. Presents to outside emergency department accompanied by law enforcement after being found disorganized twice in the community in same day, walking around naked. Patient was subsequently transferred and accepted for inpatient psychiatric hospitalization at [facility] for further safety and further stabilization     Daily Progress:no changes to treatment plan     DIAGNOSTIC FORMULATION   #schizoaffective disorder, depressed type  #alcohol use disorder, remission status unknown  #Malnutrition related to inadequate energy/protein intake as evidenced by weight loss and loss of muscle and subcutaneous fat.     PLAN     Location: Unit 5  Legal Status: Orders Placed This Encounter      Voluntary    Safety Assessment:    Behavioral Orders   Procedures     Code 1 - Restrict to Unit     Routine Programming     As clinically indicated     Status 15     Every 15 minutes.      PTA medications held:   - none (not taking for extended period of time)     PTA medications continued/changed:   -none (nonadherent)     New medications initiated:   -titrating olanzapine  -ativan 1 mg BID    Today's Changes:  No changes, encourage medication adherence    Programming: Patient will be treated in a therapeutic milieu with appropriate individual and group therapies. Education will be provided on  diagnoses, medications, and treatments. Encouraged behavioral activation and participation in group programming.     Medical diagnoses:  Per medicine    Disposition: pending clinical course        TREATMENT TEAM CARE PLAN     Progress: Continued symptoms.    Continued Stay Criteria/Rationale: Continued symptoms without sufficient improvement/resolution.    Medical/Physical: See above.    Precautions: See above.     Plan: Continue inpatient care with unit support and medication management.    Rationale for change in precautions or plan: NA due to no change.    Participants: Pedro Conde MD, Nursing, SW, OT.    The patient's care was discussed with the treatment team and chart notes were reviewed.       ATTESTATION    Pedro Conde MD  [facility]   Psychiatry    Video Visit: Patient has given verbal consent for video visit?: Yes  Type of Service: video visit for mental health treatment  Reason for Video Visit: COVID-19 [place]  Originating Site (patient location): [facility]  Distant Site (provider location): Remote Location  Mode of Communication: Video Conference via [employer]  Time of Service: Date: 06/28/2023 , Start:08:00 end: 08:30

## 2023-06-28 NOTE — PROGRESS NOTES
"OT order acknowledged.  Attempted to meet with pt for cog testing, pt refused.  States \"I don't care where I go to. You guys can decide.  Can't I just stay here\".  Rambles about his weight and not going to [place].  Does state that he was homeless prior to admit and has lived in an apartment by himself in the past and that \"It didn't go well\".  Pt states that he will think about the cog testing and is agreeable to have staff return tomorrow.    " Stroke warning signs and symptoms/Signs and symptoms of stroke/Prescribed medications/Risk factors for stroke/Stroke support groups for patients, families, and friends/Call 911 for stroke/Need for follow up after discharge/Stroke education booklet

## 2023-06-28 NOTE — PLAN OF CARE
Problem: Suicidal Behavior  Goal: Suicidal Behavior is Absent or Managed  Outcome: Progressing    Pt denies SI currently     Problem: Thought Process Alteration  Goal: Optimal Thought Clarity  Outcome: Progressing    Pt denies hearing voices though does have delusions of reference.     Problem: Adult Behavioral Health Plan of Care  Goal: Patient-Specific Goal (Individualization)  Description: Patient will attend 75% of group programming.   Patient will eat 75% of meals.   Patient will sleep 6-8 hours per night.   Pt will be agreeable to treatment team recommendations       Outcome: Progressing   Goal Outcome Evaluation:    Plan of Care Reviewed With: patient      0730 Face to face rounding complete.  Pt introduced to nursing for the shift.    Pt was very anxious this morning and told me that he would not be coming out of his room much.  He said that there is too many people and too much noise here right now. He then said that the noise is probably his fault. Pt is worried that he is being watched still and recorded to [place]. He told me that he is interested in going [place] on [place] at [place].    1500 Face to face end of shift report communicated to Evening Shift RN's along with Pt's fall risk.     Binh Tirado RN  6/28/2023

## 2023-06-28 NOTE — PROGRESS NOTES
1:1 time with the patient.  No changes made to the discharge plan at this time.   See the AVS for follow up appointments and recommendations.       The patient's  emailed the SW. He stated he thinks the patient should go to a locked facility. CITLALLI emailed the [employer] manager asking for his reasoning.

## 2023-06-28 NOTE — PLAN OF CARE
Problem: Suicidal Behavior  Goal: Suicidal Behavior is Absent or Managed  Outcome: Progressing     Problem: Thought Process Alteration  Goal: Optimal Thought Clarity  Outcome: Progressing     Problem: Adult Behavioral Health Plan of Care  Goal: Patient-Specific Goal (Individualization)  Description: Patient will attend 75% of group programming.   Patient will eat 75% of meals.   Patient will sleep 6-8 hours per night.   Pt will be agreeable to treatment team recommendations   Outcome: Progressing     Face to Face report received from Day TAYLOR RN.  Rounding completed. Patient observed. Pt has been in bed with eyes closed and regular respirations observed all night. Patient slept 7 hours.  Patient was free from falls and self-harm during NOC.  Will continue to monitor.     Face to face end of shift report [facility].     Jerri Angeles RN  6/28/2023  3:14 AM

## 2023-06-29 ENCOUNTER — APPOINTMENT (OUTPATIENT)
Dept: OCCUPATIONAL THERAPY | Facility: HOSPITAL | Age: 48
End: 2023-06-29
Attending: PSYCHIATRY & NEUROLOGY
Payer: MEDICAID

## 2023-06-29 PROCEDURE — 99232 SBSQ HOSP IP/OBS MODERATE 35: CPT | Mod: GT | Performed by: PSYCHIATRY & NEUROLOGY

## 2023-06-29 PROCEDURE — 124N000004

## 2023-06-29 PROCEDURE — 97165 OT EVAL LOW COMPLEX 30 MIN: CPT | Mod: GO

## 2023-06-29 PROCEDURE — 250N000013 HC RX MED GY IP 250 OP 250 PS 637: Performed by: PSYCHIATRY & NEUROLOGY

## 2023-06-29 RX ORDER — QUETIAPINE FUMARATE 50 MG/1
50 TABLET, FILM COATED ORAL 2 TIMES DAILY
Status: DISCONTINUED | OUTPATIENT
Start: 2023-06-29 | End: 2023-06-30

## 2023-06-29 RX ADMIN — QUETIAPINE FUMARATE 50 MG: 50 TABLET ORAL at 17:05

## 2023-06-29 RX ADMIN — OLANZAPINE 30 MG: 10 TABLET, ORALLY DISINTEGRATING ORAL at 20:33

## 2023-06-29 RX ADMIN — QUETIAPINE FUMARATE 50 MG: 50 TABLET ORAL at 08:23

## 2023-06-29 RX ADMIN — LORAZEPAM 1 MG: 0.5 TABLET ORAL at 08:24

## 2023-06-29 ASSESSMENT — ACTIVITIES OF DAILY LIVING (ADL)
HYGIENE/GROOMING: INDEPENDENT
ADLS_ACUITY_SCORE: 41
LAUNDRY: UNABLE TO COMPLETE
ADLS_ACUITY_SCORE: 41
DRESS: SCRUBS (BEHAVIORAL HEALTH);INDEPENDENT
ADLS_ACUITY_SCORE: 41
ORAL_HYGIENE: INDEPENDENT
ADLS_ACUITY_SCORE: 41

## 2023-06-29 NOTE — PROGRESS NOTES
Behavioral Health Occupational Therapy Eval      Name: Andre Bustos MRN# [id number]   Age: 48 year old YOB: 1975     Date of Consultation: June 29, 2023  Primary care provider: No Ref-Primary, Physician    Referring Physician: Pedro Conde MD.   Orders: Eval and Treat  Medical Diagnosis:     1. schizoaffective disorder, depressed type  2. alcohol use disorder, remission status unknown  3. Malnutrition related to inadequate energy/protein intake as evidenced by weight loss and loss of muscle and subcutaneous fat.    Onset of Illness/Injury: 6/28/23 OT orders received for cognitive testing. Patient was admitted to this hospital due to psychosis. Patient was brought into the ED due to walking around town naked.     Prior Level of Function: Patient reports that he was homeless prior to hospitalization. He notes that he did have an apartment prior to this but declines to answer any questions regarding how this went and how he was able to manage. Patient does decline being connected with any supportive services in the past I.e. ARM, peer support, CM, nursing medication management etc.   Prior to admission patient hadn't been eating or drinking. Patient also having difficulty with sleeping.     Current Level of Function: Patient is being provided with meals and medications by staff. Patient does need encouragement for bathing. When attempting to discuss what Andre feels would be beneficial for him he is unable to answer. Did ask about how he feels having meals provided and someone setting up medications is going in the hospital. Patient just notes that he is getting too much food. When pursuing that question again patient notes that being around too many people is difficult for him.   Patient keeps looking out the window during assessment. When asked what he is looking for he notes people with phones, patient is on the 5th floor of the hospital.   Patient completed the ACLS with encouragement with a  score of 4.2/5.8 This level indicates that the person needs 24 hour suprvision to remove dangerous objects outside of the visual field and to solve problems arising from minor changes in the environment. The person may spend a daily allowance, walk to familiar locations in the [facility], or follow a simple familiar bus route. 38% minimum cognitive assistance is required to recognize and correct hazards in routine activities.     Patient not fully understanding why OT was meeting with him, did explain about why OT orders were received. Patient not fully comprehending explanation. He does have some awareness of needing a discharge plan but is unable to fully engage in having a discussion about what type of supportive services will help him to succeed after discharge.     At this time would recommend a group home or adult foster care setting.       Patient/Family Goal: Patient agreeable to work with OT for cognitive testing.         Past Medical History:   No past medical history on file.    Past Surgical History:  No past surgical history on file.    Medications:   Current Facility-Administered Medications   Medication     acetaminophen (TYLENOL) tablet 650 mg     alum & mag hydroxide-simethicone (MAALOX) suspension 30 mL     hydrOXYzine (ATARAX) tablet 25 mg     LORazepam (ATIVAN) tablet 1 mg     melatonin tablet 3 mg     OLANZapine (zyPREXA) tablet 10 mg    Or     OLANZapine (zyPREXA) injection 10 mg     OLANZapine zydis (zyPREXA) ODT tab 30 mg     polyethylene glycol (MIRALAX) Packet 17 g     QUEtiapine (SEROquel) tablet 50 mg       Reason for OT Referral:  Mental Health History: This admission due to psychosis, admit in 2021, has worked with [facility] in the past. Other inpatient hospitalization about 15 yrs ago.   Signs/Symptoms of compliant: Patient is paranoid. Having difficulty with adequately caring for self in the community.   Aggravating factors/Current Life Stressors: MH worsening resulting in  admission to this hospital.   Current Services: : Todd Adair    Personal Information:  Family Structure: single  Living Arrangement: homeless  Finances: Social security       Physical Presentation:   Mobility: Indep      Cognition:  Orientation:  time, place and person  Safety awareness: Impaired   Attention: Diminished  Judgement/Insight: Diminished  Mood: Paranoid      Goals:   Complete further testing to aid with determining discharge recommendations.     Planned Interventions: functional cognitive testing.     Clinical Impressions:  Criteria for Skilled Therapeutic Intervention Met: yes  OT Diagnosis: impaired safety awareness, impaired cognition,   Influenced by the following impairments: Worsened MH leading to hospitalization. Difficulty caring for self.   Functional limitations due to impairment: Difficulty with engaging in ADL tasks I.e. not eating and drinking prior to admit leading to malnourishment.   Clinical presentation: Evolving/Changing  Clinical presentation rationale: Patient is still paranoid. He has minimal insight to planning for discharge or what would be beneficial for him to succeed.   Clinical Decision making (complexity): Low Complexity  Predicted Duration of Therapy Intervention (days/wks): 1 x 1wk x 3 wks.   Risks and Benefits of therapy have been explained: Yes  Patient, Family & other staff in agreement with plan of care: Yes  Comments: OT to continue per plan of care.     Total Evaluation Time: 23

## 2023-06-29 NOTE — PROGRESS NOTES
SW submitted court paper work and medical records to [facility] for them to be added to the [facility] List.

## 2023-06-29 NOTE — PLAN OF CARE
Problem: Suicidal Behavior  Goal: Suicidal Behavior is Absent or Managed  Outcome: Progressing     Problem: Thought Process Alteration  Goal: Optimal Thought Clarity  Outcome: Progressing     Problem: Adult Behavioral Health Plan of Care  Goal: Patient-Specific Goal (Individualization)  Description: Patient will attend 75% of group programming.   Patient will eat 75% of meals.   Patient will sleep 6-8 hours per night.   Pt will be agreeable to treatment team recommendations   Outcome: Progressing       Face to Face report received from DAVID Carrero.  Rounding completed. Patient observed. Pt has been in bed with eyes closed and regular respirations observed all night. Patient slept 7 hours.  Patient was free from falls and self-harm during NOC.  Will continue to monitor.     Face to face end of shift [facility]     Jerri Angeles RN  6/29/2023  6:19 AM

## 2023-06-29 NOTE — PLAN OF CARE
"Face to face shift report received from DAVID Purcell.       Problem: Adult Behavioral Health Plan of Care  Goal: Patient-Specific Goal (Individualization)  Description: Patient will attend 75% of group programming.   Patient will eat 75% of meals.   Patient will sleep 6-8 hours per night.   Pt will be agreeable to treatment team recommendations   Outcome: Not Progressing   Appears preoccupied, but denies mental health issues. Required encouragement to take 1700 Seroquel, stating \"I took that yesterday and it did nothing\" but did take stating \"just one more night.\" Declined HS Ativan, stating \"I only take that in the morning.\" Withdrawn to room. Dismissive during conversation, even telling writer \"okay you can go now\" after administering HS medications. C[place] [facility] to [place] and return dinner tray. No reports of pain.     Problem: Thought Process Alteration  Goal: Optimal Thought Clarity  Outcome: Not Progressing       Problem: Suicidal Behavior  Goal: Suicidal Behavior is Absent or Managed  Outcome: Progressing   Free from self harm or injury this shift.     Face to face end of shift report to be communicated to oncoming RN.       "

## 2023-06-29 NOTE — PLAN OF CARE
Problem: Thought Process Alteration  Goal: Optimal Thought Clarity  Outcome: Progressing     Problem: Adult Behavioral Health Plan of Care  Goal: Patient-Specific Goal (Individualization)  Description: Patient will attend 75% of group programming.   Patient will eat 75% of meals.   Patient will sleep 6-8 hours per night.   Pt will be agreeable to treatment team recommendations       Outcome: Progressing     Patient has been isolative and withdrawn to room all shift.  He took all medications as prescribed but is confused why he has to take some medications multiple times per day.  He is dismissive of staff and did not wish to participate in nursing assessment.  Comes out to [employer] [facility] to get coffee and meal trays.  Avoids all social contact.  No complaints of pain.  Vs WNL.  Face to face end of shift report communicated to night shift RN.     Alise Valdez RN  6/28/2023  10:24 PM

## 2023-06-29 NOTE — PLAN OF CARE
Problem: Thought Process Alteration  Goal: Optimal Thought Clarity  Outcome: Progressing    Pt continues to be paranoid and believes he is being recorded     Problem: Adult Behavioral Health Plan of Care  Goal: Patient-Specific Goal (Individualization)  Description: Patient will attend 75% of group programming.   Patient will eat 75% of meals.   Patient will sleep 6-8 hours per night.   Pt will be agreeable to treatment team recommendations     Outcome: Progressing   Goal Outcome Evaluation:    Plan of Care Reviewed With: patient      0730 Face to face rounding complete.  Pt introduced to nursing for the shift.    Pt was sullen this morning and stated that it is too late for him. He told me that he would rather just stay here than go somewhere. He reluctantly took his AM Seroquel but agreed to after a great deal of encouragement.  He came out of his room to get meals and to get coffee.  He also came out with [employer] to get shower stuff.  He told me that he is very hopeless and does not believe things can get better.    1500 Face to face end of shift report communicated to Evening Shift RN's along with Pt's fall risk.     Binh Tirado RN  6/29/2023

## 2023-06-29 NOTE — PROGRESS NOTES
"  [facility] PSYCHIATRY  -  PROGRESS NOTE     ID   Name: Andre Bustos  MRN#: [id number]     SUBJECTIVE   Prior to interviewing the patient, I met with nursing and reviewed patient's clinical condition. We discussed clinical care both before and after the interview. I have reviewed the patient's clinical course by review of records including previous notes, labs, and vital signs.     Per nursing, the patient had the following behavioral events over the last 24-hours: none, isolative to room    On psychiatric interview, Andre is met within his room. He states \"so what Is the plan\".  We again reviewed his plan. We talked about getting cognitive testing done and he states \"is that the lady that came yesterday\". Encouraged him to work with OT and engage with cognitive testing.  He states, \"a lot of these questions do not come easy for me\". He then states \"okay then and walks away\".      MEDICATIONS   Scheduled Meds:    LORazepam  1 mg Oral BID     OLANZapine zydis  30 mg Oral At Bedtime     QUEtiapine  50 mg Oral BID     PRN Meds:.acetaminophen, alum & mag hydroxide-simethicone, hydrOXYzine, melatonin, OLANZapine **OR** OLANZapine, polyethylene glycol     ALLERGIES   No Known Allergies     VITALS   Vitals: /65 (BP Location: Left arm)   Pulse 96   Temp 97.5  F (36.4  C) (Tympanic)   Resp 16   Ht 1.778 m (5' 10\")   Wt 57.4 kg (126 lb 9.6 oz)   SpO2 96%   BMI 18.17 kg/m       MENTAL STATUS EXAM   Appearance:  awake, adequate grooming  Attitude:  cooperative  Eye Contact: fair  Mood:  \"alright\"  Affect: flat  Speech:  mumbles  Psychomotor Behavior:  no evidence of tardive dyskinesia, dystonia, or tics  Thought Process: illogical  Associations:  no loose associations  Thought Content:  no evidence of suicidal ideation or homicidal ideation, paranoia prominent  Insight:  limited  Judgment:  limited  Oriented to: oriented to person and place only  Attention Span and Concentration:  impaired  Recent and Remote " Memory:  impaired  Gait and Station: Normal        LABS   No results found for this or any previous visit (from the past 24 hour(s)).      ASSESSMENT   Andre Bustos is a 48 year old male with a past psychiatric history notable for schizoaffective disorder, bipolar type and alcohol use disorder (associated with alcohol withdrawal seizures). Prior inpatient psychiatric hospitalizations, last known in Sept 2021 at [facility]. Previously psychiatric provider, Dr. Contreras from [id number] and most recently Miguelina STREETER CNP in August 2021. Presents to outside emergency department accompanied by law enforcement after being found disorganized twice in the community in same day, walking around naked. Patient was subsequently transferred and accepted for inpatient psychiatric hospitalization at [facility] for further safety and further stabilization     Daily Progress: cognitive testing today      DIAGNOSTIC FORMULATION   #schizoaffective disorder, depressed type  #alcohol use disorder, remission status unknown  #Malnutrition related to inadequate energy/protein intake as evidenced by weight loss and loss of muscle and subcutaneous fat.     PLAN     Location: Unit 5  Legal Status: Orders Placed This Encounter      Voluntary    Safety Assessment:    Behavioral Orders   Procedures     Code 1 - Restrict to Unit     Routine Programming     As clinically indicated     Status 15     Every 15 minutes.      PTA medications held:   - none (not taking for extended period of time)     PTA medications continued/changed:   -none (nonadherent)     New medications initiated:   -titrating olanzapine  -ativan 1 mg BID    Today's Changes:  Cognitive testing today     Programming: Patient will be treated in a therapeutic milieu with appropriate individual and group therapies. Education will be provided on diagnoses, medications, and treatments. Encouraged behavioral activation and participation in  group programming.     Medical diagnoses:  Per medicine    Disposition: pending clinical course        TREATMENT TEAM CARE PLAN     Progress: Continued symptoms.    Continued Stay Criteria/Rationale: Continued symptoms without sufficient improvement/resolution.    Medical/Physical: See above.    Precautions: See above.     Plan: Continue inpatient care with unit support and medication management.    Rationale for change in precautions or plan: NA due to no change.    Participants: Pedro Codne MD, Nursing, SW, OT.    The patient's care was discussed with the treatment team and chart notes were reviewed.       ATTESTATION    Pedro Conde MD  [facility]   Psychiatry    Video Visit: Patient has given verbal consent for video visit?: Yes  Type of Service: video visit for mental health treatment  Reason for Video Visit: COVID-19 [place]  Originating Site (patient location): [facility]  Distant Site (provider location): Remote Location  Mode of Communication: Video Conference via [employer]  Time of Service: Date: 06/29/2023 , Start:08:00 end: 08:30

## 2023-06-30 PROCEDURE — 124N000004

## 2023-06-30 PROCEDURE — 99232 SBSQ HOSP IP/OBS MODERATE 35: CPT | Mod: GT | Performed by: PSYCHIATRY & NEUROLOGY

## 2023-06-30 PROCEDURE — 250N000013 HC RX MED GY IP 250 OP 250 PS 637: Performed by: PSYCHIATRY & NEUROLOGY

## 2023-06-30 RX ORDER — QUETIAPINE FUMARATE 50 MG/1
50 TABLET, FILM COATED ORAL AT BEDTIME
Status: DISCONTINUED | OUTPATIENT
Start: 2023-06-30 | End: 2023-07-01

## 2023-06-30 RX ORDER — NICOTINE 21 MG/24HR
1 PATCH, TRANSDERMAL 24 HOURS TRANSDERMAL DAILY
Status: DISCONTINUED | OUTPATIENT
Start: 2023-06-30 | End: 2023-07-06 | Stop reason: HOSPADM

## 2023-06-30 RX ADMIN — QUETIAPINE FUMARATE 50 MG: 50 TABLET ORAL at 20:09

## 2023-06-30 RX ADMIN — LORAZEPAM 1 MG: 0.5 TABLET ORAL at 08:35

## 2023-06-30 RX ADMIN — OLANZAPINE 30 MG: 10 TABLET, ORALLY DISINTEGRATING ORAL at 20:05

## 2023-06-30 ASSESSMENT — ACTIVITIES OF DAILY LIVING (ADL)
ADLS_ACUITY_SCORE: 41
ADLS_ACUITY_SCORE: 41
HYGIENE/GROOMING: INDEPENDENT
ADLS_ACUITY_SCORE: 41
ADLS_ACUITY_SCORE: 41
HYGIENE/GROOMING: INDEPENDENT
LAUNDRY: UNABLE TO COMPLETE
ORAL_HYGIENE: INDEPENDENT
DRESS: SCRUBS (BEHAVIORAL HEALTH);INDEPENDENT
ADLS_ACUITY_SCORE: 41
ADLS_ACUITY_SCORE: 41
ORAL_HYGIENE: INDEPENDENT
ADLS_ACUITY_SCORE: 41
DRESS: SCRUBS (BEHAVIORAL HEALTH)
ADLS_ACUITY_SCORE: 41
LAUNDRY: UNABLE TO COMPLETE
ADLS_ACUITY_SCORE: 41

## 2023-06-30 NOTE — PROGRESS NOTES
"  [facility] PSYCHIATRY  -  PROGRESS NOTE     ID   Name: Andre Bustos  MRN#: [id number]     SUBJECTIVE   Prior to interviewing the patient, I met with nursing and reviewed patient's clinical condition. We discussed clinical care both before and after the interview. I have reviewed the patient's clinical course by review of records including previous notes, labs, and vital signs.     Per nursing, the patient had the following behavioral events over the last 24-hours: \"Pt was more active today spending some longer periods out of his room. He asked for shower supplies and took a shower after lunch. He played 3nder in his room some and did briefly  the dayroom watching TV with other Pt's.  He told me that he does not feel that the Seroquel is helping him and is giving him headaches declining it this morning. He does still say that there is not hope for him to find a place to be because he has ruined everything in some way.  He did not talk about being recorded on [employer] and did not look both ways when leaving his room today.\"    On psychiatric interview, he is met in his room playing 3nder. He confirms that he feels seroquel in AM is giving him a headache. Discussed that since we added the seroquel he has been more talkative, less isolative and out of his room more. He state s\"I dunno\".  He agrees to take it at night instead of in AM.  He is more talkative today and is aware of his medications taking. He is still slightly confused about the commitment process.  He states \"I know this is better than before\" (referring to before the hospital) - possibly some increased insight.        MEDICATIONS   Scheduled Meds:    LORazepam  1 mg Oral BID     nicotine  1 patch Transdermal Daily     nicotine   Transdermal Q8H     OLANZapine zydis  30 mg Oral At Bedtime     QUEtiapine  50 mg Oral At Bedtime     PRN Meds:.acetaminophen, alum & mag hydroxide-simethicone, hydrOXYzine, melatonin, nicotine, OLANZapine " "**OR** OLANZapine, polyethylene glycol     ALLERGIES   No Known Allergies     VITALS   Vitals: /70   Pulse 90   Temp 97.8  F (36.6  C) (Tympanic)   Resp 18   Ht 1.778 m (5' 10\")   Wt 57.4 kg (126 lb 9.6 oz)   SpO2 97%   BMI 18.17 kg/m       MENTAL STATUS EXAM   Appearance:  awake, adequate grooming  Attitude:  cooperative  Eye Contact: fair  Mood:  \"alright\"  Affect: flat  Speech:  mumbles  Psychomotor Behavior:  no evidence of tardive dyskinesia, dystonia, or tics  Thought Process: illogical  Associations:  no loose associations  Thought Content:  no evidence of suicidal ideation or homicidal ideation, paranoia prominent  Insight:  limited  Judgment:  limited  Oriented to: oriented to person and place only  Attention Span and Concentration:  impaired  Recent and Remote Memory:  impaired  Gait and Station: Normal           LABS   No results found for this or any previous visit (from the past 24 hour(s)).      ASSESSMENT   Andre Bustos is a 48 year old male with a past psychiatric history notable for schizoaffective disorder, bipolar type and alcohol use disorder (associated with alcohol withdrawal seizures). Prior inpatient psychiatric hospitalizations, last known in Sept 2021 at [facility]. Previously psychiatric provider, Dr. Contreras from [id number] and most recently Miguelina STREETER CNP in August 2021. Presents to outside emergency department accompanied by law enforcement after being found disorganized twice in the community in same day, walking around naked. Patient was subsequently transferred and accepted for inpatient psychiatric hospitalization at [facility] for further safety and further stabilization      Daily Progress: discontinue AM seroquel, consolidate to evening. He has had improvement since seroquel was added to regimen several days ago.            DIAGNOSTIC FORMULATION   #schizoaffective disorder, depressed type  #alcohol use disorder, " remission status unknown  #Malnutrition related to inadequate energy/protein intake as evidenced by weight loss and loss of muscle and subcutaneous fat.     PLAN     Location: Unit 5  Legal Status: Orders Placed This Encounter      Voluntary    Safety Assessment:    Behavioral Orders   Procedures     Code 1 - Restrict to Unit     Routine Programming     As clinically indicated     Status 15     Every 15 minutes.        PTA medications held:   - none (not taking for extended period of time)     PTA medications continued/changed:   -none (nonadherent)     New medications initiated:   -titrating olanzapine  -ativan 1 mg BID    Today's Changes:  -conslidate seroquel to evenings    Programming: Patient will be treated in a therapeutic milieu with appropriate individual and group therapies. Education will be provided on diagnoses, medications, and treatments. Encouraged behavioral activation and participation in group programming.     Medical diagnoses:  Per medicine    Disposition: pending clinical course        TREATMENT TEAM CARE PLAN     Progress: Continued symptoms.    Continued Stay Criteria/Rationale: Continued symptoms without sufficient improvement/resolution.    Medical/Physical: See above.    Precautions: See above.     Plan: Continue inpatient care with unit support and medication management.    Rationale for change in precautions or plan: NA due to no change.    Participants: Pedro Conde MD, Nursing, SW, OT.    The patient's care was discussed with the treatment team and chart notes were reviewed.       ATTESTATION    Pedro Conde MD  [facility]   Psychiatry    Video Visit: Patient has given verbal consent for video visit?: Yes  Type of Service: video visit for mental health treatment  Reason for Video Visit: COVID-19 [place]  Originating Site (patient location): [facility]  Distant Site (provider location): Remote Location  Mode of Communication: Video  Conference via [employer]  Time of Service: Date: 06/30/2023 , Start: 11:00 end: 11:30

## 2023-06-30 NOTE — PROGRESS NOTES
The patient's  emailed SW and updated them that he plans to come and visit the patient and he was going to add him to the [facility] list.

## 2023-06-30 NOTE — PLAN OF CARE
Problem: Suicidal Behavior  Goal: Suicidal Behavior is Absent or Managed  Outcome: Progressing     Problem: Thought Process Alteration  Goal: Optimal Thought Clarity  Outcome: Progressing     Problem: Adult Behavioral Health Plan of Care  Goal: Patient-Specific Goal (Individualization)  Description: Patient will attend 75% of group programming.   Patient will eat 75% of meals.   Patient will sleep 6-8 hours per night.   Pt will be agreeable to treatment team recommendations   Outcome: Progressing     Face to Face report received from DAVID Spann.  Rounding completed. Patient observed. Pt has been in bed with eyes closed and regular respirations observed all night. Patient slept 7 hours.  Patient was free from falls and self-harm during NOC.  Will continue to monitor.     Face to face end of shift [facility].     Jerri Angeles RN  6/30/2023  4:47 AM

## 2023-06-30 NOTE — PLAN OF CARE
Problem: Suicidal Behavior  Goal: Suicidal Behavior is Absent or Managed  Outcome: Progressing    Pt denies SI     Problem: Thought Process Alteration  Goal: Optimal Thought Clarity  Outcome: Progressing     Pt appears somewhat less paranoid today    Problem: Adult Behavioral Health Plan of Care  Goal: Patient-Specific Goal (Individualization)  Description: Patient will attend 75% of group programming.   Patient will eat 75% of meals.   Patient will sleep 6-8 hours per night.   Pt will be agreeable to treatment team recommendations     Outcome: Progressing   Goal Outcome Evaluation:    Plan of Care Reviewed With: patient      0730 Face to face rounding complete.  Pt introduced to nursing for the shift.       Pt was more active today spending some longer periods out of his room. He asked for shower supplies and took a shower after lunch. He played [employer] in his room some and did briefly  the dayroom watching TV with other Pt's.  He told me that he does not feel that the Seroquel is helping him and is giving him headaches declining it this morning. He does still say that there is not hope for him to find a place to be because he has ruined everything in some way.  He did not talk about being recorded on [employer] and did not look both ways when leaving his room today.     1500 Face to face end of shift report communicated to Evening Shift RN's along with Pt's fall risk.      Binh Tirado RN  6/30/2023

## 2023-07-01 PROCEDURE — 99232 SBSQ HOSP IP/OBS MODERATE 35: CPT | Mod: GT | Performed by: PSYCHIATRY & NEUROLOGY

## 2023-07-01 PROCEDURE — 250N000013 HC RX MED GY IP 250 OP 250 PS 637: Performed by: PSYCHIATRY & NEUROLOGY

## 2023-07-01 PROCEDURE — 124N000004

## 2023-07-01 RX ORDER — QUETIAPINE FUMARATE 50 MG/1
100 TABLET, FILM COATED ORAL AT BEDTIME
Status: DISCONTINUED | OUTPATIENT
Start: 2023-07-01 | End: 2023-07-06 | Stop reason: HOSPADM

## 2023-07-01 RX ADMIN — LORAZEPAM 1 MG: 0.5 TABLET ORAL at 08:03

## 2023-07-01 RX ADMIN — OLANZAPINE 30 MG: 10 TABLET, ORALLY DISINTEGRATING ORAL at 20:20

## 2023-07-01 ASSESSMENT — ACTIVITIES OF DAILY LIVING (ADL)
ADLS_ACUITY_SCORE: 41
DRESS: SCRUBS (BEHAVIORAL HEALTH)
LAUNDRY: UNABLE TO COMPLETE
ADLS_ACUITY_SCORE: 41
ADLS_ACUITY_SCORE: 41
ORAL_HYGIENE: INDEPENDENT
ADLS_ACUITY_SCORE: 41
HYGIENE/GROOMING: INDEPENDENT

## 2023-07-01 NOTE — PLAN OF CARE
Face to face shift report received from Alise HERNANDEZ. Rounding completed, pt observed in room at the start of the shift.    Patient withdrawn to room majority of the day playing cards. Does come out to the lounge for meals and coffee. Alert and making needs known. Eating well for meals. Quiet during conversation with this writer. Flat affect. Declined nicotine patch this morning stating he will decide if he wants it later. Compliant with scheduled Ativan and nursing assessment. Denies auditory hallucinations. No reports of pain. Encouraged to attend groups and spend more time in the day room. Remains isolated throughout the afternoon.    Problem: Adult Behavioral Health Plan of Care  Goal: Patient-Specific Goal (Individualization)  Description: Patient will attend 75% of group programming.   Patient will eat 75% of meals.   Patient will sleep 6-8 hours per night.   Pt will be agreeable to treatment team recommendations       Outcome: Progressing     Problem: Thought Process Alteration  Goal: Optimal Thought Clarity  Description: Patient will have clear linear conversations while on the unit.  Outcome: Progressing     Problem: Suicidal Behavior  Goal: Suicidal Behavior is Absent or Managed  Description: Patient will remain free of self harm while on the unit.  Patient will be able to list 2 coping skills by discharge.  Patient will inform staff of suicidal thoughts while on the unit.   Outcome: Progressing    Face to face report will be communicated to oncoming RN.    Sarah Grayson RN  7/1/2023

## 2023-07-01 NOTE — PLAN OF CARE
SHIFT NOTE: 1500 to 0730    Problem: Thought Process Alteration  Goal: Optimal Thought Clarity  Outcome: Progressing     Problem: Adult Behavioral Health Plan of Care  Goal: Patient-Specific Goal (Individualization)  Description: Patient will attend 75% of group programming.   Patient will eat 75% of meals.   Patient will sleep 6-8 hours per night.   Pt will be agreeable to treatment team recommendations       Outcome: Progressing     Patient has been calm and cooperative.  Refused his Hs Ativan but did agree to take the seroquel for one more day.  Out in the lounge more this shift but still isolates to his room a lot.  Limited in conversation but makes needs known to staff.  Affect is flat and guarded.   No complaints of pain.  Vs WNL.  In bed asleep by 2100. Patient slept through the night with regular respirations and position changes noted.  Face to face end of shift report communicated to day shift RN.     Alise Valdez, DAVID  6/30/2023  9:54 PM

## 2023-07-01 NOTE — PROGRESS NOTES
"  [facility] PSYCHIATRY  -  PROGRESS NOTE     ID   Name: Andre Bustos  MRN#: [id number]     SUBJECTIVE   Prior to interviewing the patient, I met with nursing and reviewed patient's clinical condition. We discussed clinical care both before and after the interview. I have reviewed the patient's clinical course by review of records including previous notes, labs, and vital signs.     Per nursing, the patient had the following behavioral events over the last 24-hours: none.    On psychiatric interview, Andre is met wtihin his room. He was encouraged to go to more groups and spend time outside of his room he states he is not sleeping well. Informed him we would increase seroquel to 100 mg at bedtime.  Asked if he would like to consolidate to ativan to all at night but states, \"no it is good in the morning\". He can't expand on why.       MEDICATIONS   Scheduled Meds:    LORazepam  1 mg Oral BID     nicotine  1 patch Transdermal Daily     nicotine   Transdermal Q8H     OLANZapine zydis  30 mg Oral At Bedtime     QUEtiapine  100 mg Oral At Bedtime     PRN Meds:.acetaminophen, alum & mag hydroxide-simethicone, hydrOXYzine, melatonin, nicotine, OLANZapine **OR** OLANZapine, polyethylene glycol     ALLERGIES   No Known Allergies     VITALS   Vitals: /69 (BP Location: Left arm)   Pulse 98   Temp 97.6  F (36.4  C) (Tympanic)   Resp 14   Ht 1.778 m (5' 10\")   Wt 57.4 kg (126 lb 9.6 oz)   SpO2 98%   BMI 18.17 kg/m       MENTAL STATUS EXAM   Appearance:  awake, adequate grooming  Attitude:  cooperative  Eye Contact: fair  Mood:  \"you know\"  Affect: flat  Speech:  mumbles  Psychomotor Behavior:  no evidence of tardive dyskinesia, dystonia, or tics  Thought Process: illogical  Associations:  no loose associations  Thought Content:  no evidence of suicidal ideation or homicidal ideation, paranoia prominent  Insight:  limited  Judgment:  limited  Oriented to: oriented to person and place only  Attention Span and " Concentration:  impaired  Recent and Remote Memory:  impaired  Gait and Station: Normal           LABS   No results found for this or any previous visit (from the past 24 hour(s)).      ASSESSMENT   Andre Bustos is a 48 year old male with a past psychiatric history notable for schizoaffective disorder, bipolar type and alcohol use disorder (associated with alcohol withdrawal seizures). Prior inpatient psychiatric hospitalizations, last known in Sept 2021 at [facility]. Previously psychiatric provider, Dr. Contreras from [id number] and most recently Miguelina STRETEER CNP in August 2021. Presents to outside emergency department accompanied by law enforcement after being found disorganized twice in the community in same day, walking around naked. Patient was subsequently transferred and accepted for inpatient psychiatric hospitalization at [facility] for further safety and further stabilization      Daily Progress: increase Seroquel to 100 mg at bedtime          DIAGNOSTIC FORMULATION   #schizoaffective disorder, depressed type  #alcohol use disorder, remission status unknown  #Malnutrition related to inadequate energy/protein intake as evidenced by weight loss and loss of muscle and subcutaneous fat.     PLAN     Location: Unit 5  Legal Status: Orders Placed This Encounter      Voluntary    Safety Assessment:    Behavioral Orders   Procedures     Code 1 - Restrict to Unit     Routine Programming     As clinically indicated     Status 15     Every 15 minutes.        PTA medications held:   - none (not taking for extended period of time)     PTA medications continued/changed:   -none (nonadherent)     New medications initiated:   -titrating olanzapine  -ativan 1 mg BID  -Seroquel 50 mg BID, --> seroquel 100 mg at bedtime     Today's Changes:  Increase seroquel to 100 mg at bedtime     Programming: Patient will be treated in a therapeutic milieu with appropriate individual and  group therapies. Education will be provided on diagnoses, medications, and treatments. Encouraged behavioral activation and participation in group programming.     Medical diagnoses:  Per medicine    Disposition: pending clinical course        TREATMENT TEAM CARE PLAN     Progress: Continued symptoms.    Continued Stay Criteria/Rationale: Continued symptoms without sufficient improvement/resolution.    Medical/Physical: See above.    Precautions: See above.     Plan: Continue inpatient care with unit support and medication management.    Rationale for change in precautions or plan: NA due to no change.    Participants: Pedro Conde MD, Nursing, SW, OT.    The patient's care was discussed with the treatment team and chart notes were reviewed.       ATTESTATION    Pedro Conde MD  [facility]   Psychiatry    Video Visit: Patient has given verbal consent for video visit?: Yes  Type of Service: video visit for mental health treatment  Reason for Video Visit: COVID-[place]  Originating Site (patient location): [facility]  Distant Site (provider location): Remote Location  Mode of Communication: Video Conference via [employer]  Time of Service: Date: 07/01/2023 , Start: 08:00 end: 08:30

## 2023-07-02 PROCEDURE — 124N000004

## 2023-07-02 PROCEDURE — 250N000013 HC RX MED GY IP 250 OP 250 PS 637: Performed by: PSYCHIATRY & NEUROLOGY

## 2023-07-02 RX ADMIN — LORAZEPAM 1 MG: 0.5 TABLET ORAL at 08:06

## 2023-07-02 RX ADMIN — OLANZAPINE 30 MG: 10 TABLET, ORALLY DISINTEGRATING ORAL at 20:12

## 2023-07-02 ASSESSMENT — ACTIVITIES OF DAILY LIVING (ADL)
ADLS_ACUITY_SCORE: 41
ORAL_HYGIENE: INDEPENDENT
ADLS_ACUITY_SCORE: 41
DRESS: SCRUBS (BEHAVIORAL HEALTH)
ADLS_ACUITY_SCORE: 41
HYGIENE/GROOMING: INDEPENDENT
ADLS_ACUITY_SCORE: 41

## 2023-07-02 NOTE — PLAN OF CARE
"  Problem: Adult Behavioral Health Plan of Care  Goal: Patient-Specific Goal (Individualization)  Description: Patient will attend 75% of group programming.   Patient will eat 75% of meals.   Patient will sleep 6-8 hours per night.   Pt will be agreeable to treatment team recommendations       Outcome: Progressing   Goal Outcome Evaluation:    Plan of Care Reviewed With: patient      Patient is Alert, able to make needs known. VSS, afebrile. Assessment and vitals as charted. Denies pain.   Patient flat, blunt affect. Makes minimal conversation with writer but is cooperative with nursing assessment. Endorses \"little bit\" of anxiety. Denies needing any PRNs but does take scheduled ativan. Declined nicotine patch this morning. Withdrawn and isolative to room much of shift. He does come out periodically for coffee and meal tray. Steady gait. No falls. Does not attend groups. Appetite fair. Ate 100% of breakfast. Reports sleeping better.   1230: Patient is up in lounge watching tv after lunch with peers. He does not appear to socialize with peers at all. Ate 100% of lunch.   1300: Patient spent about an hour out in lounge.   1500: Continuing care for patient into afternoon shift.   2100: Patient has been in room most of shift. Did come out again this evening for about an hour. Ate 100% of dinner. Refused scheduled ativan and Seroquel. Did take Zyprexa.     Face to face report given with opportunity to observe patient.    Report given to DAVID CLARKE.     Jodi Matias RN   7/2/2023  10:59 PM      "

## 2023-07-02 NOTE — PLAN OF CARE
SHIFT NOTE: 1500 to 0730    Problem: Thought Process Alteration  Goal: Optimal Thought Clarity  Description: Patient will have clear linear conversations while on the unit.  Outcome: Progressing     Problem: Adult Behavioral Health Plan of Care  Goal: Patient-Specific Goal (Individualization)  Description: Patient will attend 75% of group programming.   Patient will eat 75% of meals.   Patient will sleep 6-8 hours per night.   Pt will be agreeable to treatment team recommendations       Outcome: Progressing     Patient isolative and withdrawn to room all shift.  He refused his HS Ativan and Seroquel but did agree to take his Zyprexa.  Affect is flat and blunted.  Does not wish to engage with staff.  Ate 100% of meals.  No complaints of pain.  VS WNL.  In bed asleep by 2130.  Slept through the night with regular respirations and position changes noted.    Face to face end of shift report communicated to day shift RN.     Alise Valdez, RN  7/1/2023  10:09 PM

## 2023-07-03 PROCEDURE — 99232 SBSQ HOSP IP/OBS MODERATE 35: CPT | Mod: GT | Performed by: PSYCHIATRY & NEUROLOGY

## 2023-07-03 PROCEDURE — 124N000004

## 2023-07-03 PROCEDURE — 250N000013 HC RX MED GY IP 250 OP 250 PS 637: Performed by: PSYCHIATRY & NEUROLOGY

## 2023-07-03 RX ADMIN — LORAZEPAM 1 MG: 0.5 TABLET ORAL at 08:28

## 2023-07-03 RX ADMIN — OLANZAPINE 30 MG: 10 TABLET, ORALLY DISINTEGRATING ORAL at 20:13

## 2023-07-03 ASSESSMENT — ACTIVITIES OF DAILY LIVING (ADL)
ADLS_ACUITY_SCORE: 41
DRESS: SCRUBS (BEHAVIORAL HEALTH)
ADLS_ACUITY_SCORE: 41
ADLS_ACUITY_SCORE: 41
DRESS: SCRUBS (BEHAVIORAL HEALTH);INDEPENDENT
HYGIENE/GROOMING: INDEPENDENT
ADLS_ACUITY_SCORE: 41
ORAL_HYGIENE: INDEPENDENT
HYGIENE/GROOMING: INDEPENDENT
ORAL_HYGIENE: INDEPENDENT
ADLS_ACUITY_SCORE: 41
LAUNDRY: UNABLE TO COMPLETE
ADLS_ACUITY_SCORE: 41

## 2023-07-03 NOTE — PROGRESS NOTES
"  [facility] PSYCHIATRY  -  PROGRESS NOTE     ID   Name: Andre Bustos  MRN#: [id number]     SUBJECTIVE   Prior to interviewing the patient, I met with nursing and reviewed patient's clinical condition. We discussed clinical care both before and after the interview. I have reviewed the patient's clinical course by review of records including previous notes, labs, and vital signs.     Per nursing, the patient had the following behavioral events over the last 24-hours: none.    On psychiatric interview, Andre is met wtihin his room. We talked today about medication adherence.  We talked about the importance of taking his Seroquel at night.  He denies any physical pain.  He continues to ask whether or not he is on commitment - we reviewed this again.      MEDICATIONS   Scheduled Meds:    LORazepam  1 mg Oral BID     nicotine  1 patch Transdermal Daily     nicotine   Transdermal Q8H     OLANZapine zydis  30 mg Oral At Bedtime     QUEtiapine  100 mg Oral At Bedtime     PRN Meds:.acetaminophen, alum & mag hydroxide-simethicone, hydrOXYzine, melatonin, nicotine, OLANZapine **OR** OLANZapine, polyethylene glycol     ALLERGIES   No Known Allergies     VITALS   Vitals: /63 (BP Location: Left arm)   Pulse 93   Temp 97.3  F (36.3  C) (Tympanic)   Resp 14   Ht 1.778 m (5' 10\")   Wt 61.5 kg (135 lb 9.6 oz)   SpO2 98%   BMI 19.46 kg/m       MENTAL STATUS EXAM   Appearance:  awake, adequate grooming  Attitude:  cooperative  Eye Contact: fair  Mood: \"alright\"  Affect: flat  Speech:  mumbles  Psychomotor Behavior:  no evidence of tardive dyskinesia, dystonia, or tics  Thought Process: illogical  Associations:  no loose associations  Thought Content:  no evidence of suicidal ideation or homicidal ideation, paranoia prominent  Insight:  limited  Judgment:  limited  Oriented to: oriented to person and place only  Attention Span and Concentration:  impaired  Recent and Remote Memory:  impaired  Gait and Station: Normal "           LABS   No results found for this or any previous visit (from the past 24 hour(s)).      ASSESSMENT   Andre Bustos is a 48 year old male with a past psychiatric history notable for schizoaffective disorder, bipolar type and alcohol use disorder (associated with alcohol withdrawal seizures). Prior inpatient psychiatric hospitalizations, last known in Sept 2021 at [facility]. Previously psychiatric provider, Dr. Contreras from [id number] and most recently Miguelina STREETER CNP in August 2021. Presents to outside emergency department accompanied by law enforcement after being found disorganized twice in the community in same day, walking around naked. Patient was subsequently transferred and accepted for inpatient psychiatric hospitalization at [facility] for further safety and further stabilization      Daily Progress: encouraged him to take evening Seroquel 100 mg at bedtime          DIAGNOSTIC FORMULATION   #schizoaffective disorder, depressed type  #alcohol use disorder, remission status unknown  #Malnutrition related to inadequate energy/protein intake as evidenced by weight loss and loss of muscle and subcutaneous fat.     PLAN     Location: Unit 5  Legal Status: Orders Placed This Encounter      Voluntary    Safety Assessment:    Behavioral Orders   Procedures     Code 1 - Restrict to Unit     Routine Programming     As clinically indicated     Status 15     Every 15 minutes.        PTA medications held:   - none (not taking for extended period of time)     PTA medications continued/changed:   -none (nonadherent)     New medications initiated:   -titrating olanzapine  -ativan 1 mg BID  -Seroquel 50 mg BID, --> seroquel 100 mg at bedtime     Today's Changes:  -encourage him to take Seroquel 100 mg at bedtime     Programming: Patient will be treated in a therapeutic milieu with appropriate individual and group therapies. Education will be provided on diagnoses,  medications, and treatments. Encouraged behavioral activation and participation in group programming.     Medical diagnoses:  Per medicine    Disposition: pending clinical course        TREATMENT TEAM CARE PLAN     Progress: Continued symptoms.    Continued Stay Criteria/Rationale: Continued symptoms without sufficient improvement/resolution.    Medical/Physical: See above.    Precautions: See above.     Plan: Continue inpatient care with unit support and medication management.    Rationale for change in precautions or plan: NA due to no change.    Participants: Pedro Conde MD, Nursing, SW, OT.    The patient's care was discussed with the treatment team and chart notes were reviewed.       ATTESTATION    Pedro Conde MD  [facility]   Psychiatry    Video Visit: Patient has given verbal consent for video visit?: Yes  Type of Service: video visit for mental health treatment  Reason for Video Visit: COVID-19 [place]  Originating Site (patient location): [facility]  Distant Site (provider location): Remote Location  Mode of Communication: Video Conference via [employer]  Time of Service: Date: 07/03/2023 , Start: 08:30 end: 09:00

## 2023-07-03 NOTE — PLAN OF CARE
Problem: Suicidal Behavior  Goal: Suicidal Behavior is Absent or Managed  Description: Patient will remain free of self harm while on the unit.  Patient will be able to list 2 coping skills by discharge.  Patient will inform staff of suicidal thoughts while on the unit.   Outcome: Progressing     Problem: Thought Process Alteration  Goal: Optimal Thought Clarity  Description: Patient will have clear linear conversations while on the unit.  Outcome: Progressing     Problem: Adult Behavioral Health Plan of Care  Goal: Patient-Specific Goal (Individualization)  Description: Patient will attend 75% of group programming.   Patient will eat 75% of meals.   Patient will sleep 6-8 hours per night.   Pt will be agreeable to treatment team recommendations   Outcome: Progressing       Face to Face report received from DAVID Zapata.  Rounding completed. Patient observed. Pt has been in bed with eyes closed and regular respirations observed all night. Patient slept 7 hours.  Patient was free from falls and self-harm during NOC.  Will continue to monitor.     Face to face end of shift report communicated to [facility] day shift RN.     Jerri Angeles RN  7/3/2023  4:03 AM

## 2023-07-03 NOTE — PLAN OF CARE
"  Problem: Adult Behavioral Health Plan of Care  Goal: Patient-Specific Goal (Individualization)  Description: Patient will attend 75% of group programming.   Patient will eat 75% of meals.   Patient will sleep 6-8 hours per night.   Pt will be agreeable to treatment team recommendations     Patient is isolative and withdrawn, spending time in his room sitting on his bed. Affect is flat, mood is calm. He admits to anxiety and depression. Speech is quiet, mumbling at times. States \"I'm better than I was before, I just hope they don't keep me here forever\". He plays cards by himself. Has been appropriate with staff and peers.   Face to face end of shift report communicated to oncoming RN.     Miranda Regan RN  7/3/2023  2:25 PM    Outcome: Progressing     Problem: Thought Process Alteration  Goal: Optimal Thought Clarity  Description: Patient will have clear linear conversations while on the unit.  Outcome: Progressing     Problem: Suicidal Behavior  Goal: Suicidal Behavior is Absent or Managed  Description: Patient will remain free of self harm while on the unit.  Patient will be able to list 2 coping skills by discharge.  Patient will inform staff of suicidal thoughts while on the unit.   Outcome: Progressing   Goal Outcome Evaluation:    Plan of Care Reviewed With: patient                   "

## 2023-07-03 NOTE — PLAN OF CARE
"Problem: Adult Behavioral Health Plan of Care  Goal: Patient-Specific Goal (Individualization)  Description: Patient will attend 75% of group programming.   Patient will eat 75% of meals.   Patient will sleep 6-8 hours per night.   Pt will be agreeable to treatment team recommendations   Outcome: Progressing  Note: Face to face end of shift report received from Miranda ADNREWS RN. Rounding completed. Patient observed in the lounge.     Day Paige RN  7/3/2023  3:50 PM     Pt requested to use the clippers to cut his hair a couple of times tonight. Pt was informed that he could not use them to cut the hair on his head.     Pt observed to be spending more time out of his room. He ate 100% of supper. He refused his scheduled Seroquel and Ativan tonight. He stated \"I''ll just take the Zyprexa. I don't like mixing medications.\" Attempted to provide medication education, but pt not receptive.     He endorsed some ongoing anxiety and depression. He denied hallucinations, HI, and SI.    Face to face end of shift report communicated to oncoming RN.      Problem: Thought Process Alteration  Goal: Optimal Thought Clarity  Description: Patient will have clear linear conversations while on the unit.  Outcome: Progressing  Note: Pt has been engaging more in conversation compared to previous encounters.      Problem: Suicidal Behavior  Goal: Suicidal Behavior is Absent or Managed  Description: Patient will remain free of self harm while on the unit.  Patient will be able to list 2 coping skills by discharge.  Patient will inform staff of suicidal thoughts while on the unit.   Outcome: Progressing  Note: Pt denied suicidal ideation. He remained free from self harm.              "

## 2023-07-03 NOTE — PROGRESS NOTES
"CLINICAL NUTRITION SERVICES  -  REASSESSMENT NOTE    Andre Bustos :    48 yom admitted for suicidal ideation. Medical/psych hx includes schizoaffective disorder, bipolar type and alcohol use disorder. Adequate intake continues. Weight is up another 9lbs for a total of 23lb gain this admission.    Diet Order: Regular, double portions   Intake: 30 meals with % intake    Height: 5' 10\"  Weight: 135 lbs 9.6 oz  Body mass index is 19.46 kg/m .  Weight Status:  Normal BMI  Weight History:   Wt Readings from Last 10 Encounters:   07/02/23 61.5 kg (135 lb 9.6 oz)   06/18/23 57.4 kg (126 lb 9.6 oz)   06/05/23 51 kg (112 lb 6.4 oz)- first measured weight   08/18/21 68.9 kg (151 lb 12.8 oz)     Weight used to calculate needs: 68.6kg ibw  Estimated Energy Needs: 4949-7352 kcals (30-35 Kcal/Kg)   Estimated Protein Needs:  grams protein (1-1.5 g pro/Kg)      Malnutrition Diagnosis: Severe malnutrition- continues to improve with adequate intake and weight gain  In Context of:  Environmental or social circumstances     NUTRITION RECOMMENDATIONS  - Encourage intake with meals/snacks  - continue monitoring weight     MONITORING AND EVALUATION:  RD will monitor as needed  "

## 2023-07-04 PROCEDURE — 99232 SBSQ HOSP IP/OBS MODERATE 35: CPT | Mod: GT | Performed by: PSYCHIATRY & NEUROLOGY

## 2023-07-04 PROCEDURE — 250N000013 HC RX MED GY IP 250 OP 250 PS 637: Performed by: PSYCHIATRY & NEUROLOGY

## 2023-07-04 PROCEDURE — 124N000004

## 2023-07-04 RX ADMIN — LORAZEPAM 1 MG: 0.5 TABLET ORAL at 08:05

## 2023-07-04 RX ADMIN — OLANZAPINE 30 MG: 10 TABLET, ORALLY DISINTEGRATING ORAL at 20:55

## 2023-07-04 RX ADMIN — LORAZEPAM 1 MG: 0.5 TABLET ORAL at 20:57

## 2023-07-04 ASSESSMENT — ACTIVITIES OF DAILY LIVING (ADL)
ADLS_ACUITY_SCORE: 41
HYGIENE/GROOMING: INDEPENDENT
ADLS_ACUITY_SCORE: 41
LAUNDRY: UNABLE TO COMPLETE
ADLS_ACUITY_SCORE: 41
ORAL_HYGIENE: INDEPENDENT
DRESS: SCRUBS (BEHAVIORAL HEALTH)
ADLS_ACUITY_SCORE: 41

## 2023-07-04 NOTE — PROGRESS NOTES
"  [facility] PSYCHIATRY  -  PROGRESS NOTE     ID   Name: Andre Bustos  MRN#: [id number]     SUBJECTIVE   Prior to interviewing the patient, I met with nursing and reviewed patient's clinical condition. We discussed clinical care both before and after the interview. I have reviewed the patient's clinical course by review of records including previous notes, labs, and vital signs.     Per nursing, the patient had the following behavioral events over the last 24-hours: none.    On psychiatric interview, Andre is met wtihin his room.Reports he did not sleep well.   Encouraged him to take evening Seroquel.  He wishes writer a happy 4th of July.  He states he will take the seroquel tonight.  He has no questions or concerns today and does not ask about the commitment process like he usually does.      MEDICATIONS   Scheduled Meds:    LORazepam  1 mg Oral BID     nicotine  1 patch Transdermal Daily     nicotine   Transdermal Q8H     OLANZapine zydis  30 mg Oral At Bedtime     QUEtiapine  100 mg Oral At Bedtime     PRN Meds:.acetaminophen, alum & mag hydroxide-simethicone, hydrOXYzine, melatonin, nicotine, OLANZapine **OR** OLANZapine, polyethylene glycol     ALLERGIES   No Known Allergies     VITALS   Vitals: /63   Pulse 87   Temp 98.1  F (36.7  C) (Tympanic)   Resp 16   Ht 1.778 m (5' 10\")   Wt 61.5 kg (135 lb 9.6 oz)   SpO2 98%   BMI 19.46 kg/m       MENTAL STATUS EXAM   Appearance:  awake, adequate grooming  Attitude:  cooperative  Eye Contact: fair  Mood: \"fine\"  Affect: flat, tinge of irritability   Speech:  mumbles  Psychomotor Behavior:  no evidence of tardive dyskinesia, dystonia, or tics  Thought Process: illogical  Associations:  no loose associations  Thought Content:  no evidence of suicidal ideation or homicidal ideation, paranoia prominent  Insight:  limited  Judgment:  limited  Oriented to: oriented to person and place only  Attention Span and Concentration:  impaired  Recent and Remote " Memory:  impaired  Gait and Station: Normal           LABS   No results found for this or any previous visit (from the past 24 hour(s)).      ASSESSMENT   Andre Bustos is a 48 year old male with a past psychiatric history notable for schizoaffective disorder, bipolar type and alcohol use disorder (associated with alcohol withdrawal seizures). Prior inpatient psychiatric hospitalizations, last known in Sept 2021 at [facility]. Previously psychiatric provider, Dr. Contreras from [id number] and most recently Miguelina STREETER CNP in August 2021. Presents to outside emergency department accompanied by law enforcement after being found disorganized twice in the community in same day, walking around naked. Patient was subsequently transferred and accepted for inpatient psychiatric hospitalization at [facility] for further safety and further stabilization      Daily Progress: not sleeping well, again encouraged him to take evening Seroquel 100 mg at bedtime          DIAGNOSTIC FORMULATION   #schizoaffective disorder, depressed type  #alcohol use disorder, remission status unknown  #Malnutrition related to inadequate energy/protein intake as evidenced by weight loss and loss of muscle and subcutaneous fat.     PLAN     Location: Unit 5  Legal Status: Orders Placed This Encounter      Voluntary    Safety Assessment:    Behavioral Orders   Procedures     Code 1 - Restrict to Unit     Routine Programming     As clinically indicated     Status 15     Every 15 minutes.        PTA medications held:   - none (not taking for extended period of time)     PTA medications continued/changed:   -none (nonadherent)     New medications initiated:   -titrating olanzapine  -ativan 1 mg BID  -Seroquel 50 mg BID, --> seroquel 100 mg at bedtime     Today's Changes:  -encourage him to take Seroquel 100 mg at bedtime     Programming: Patient will be treated in a therapeutic milieu with appropriate  individual and group therapies. Education will be provided on diagnoses, medications, and treatments. Encouraged behavioral activation and participation in group programming.     Medical diagnoses:  Per medicine    Disposition: pending clinical course        TREATMENT TEAM CARE PLAN     Progress: Continued symptoms.    Continued Stay Criteria/Rationale: Continued symptoms without sufficient improvement/resolution.    Medical/Physical: See above.    Precautions: See above.     Plan: Continue inpatient care with unit support and medication management.    Rationale for change in precautions or plan: NA due to no change.    Participants: Pedro Conde MD, Nursing, SW, OT.    The patient's care was discussed with the treatment team and chart notes were reviewed.       ATTESTATION    Pedro Conde MD  [facility]   Psychiatry    Video Visit: Patient has given verbal consent for video visit?: Yes  Type of Service: video visit for mental health treatment  Reason for Video Visit: COVID-19 [place]  Originating Site (patient location): [facility]  Distant Site (provider location): Remote Location  Mode of Communication: Video Conference via [employer]  Time of Service: Date: 07/04/2023 , Start: 08:00 end: 08:30

## 2023-07-04 NOTE — PLAN OF CARE
Problem: Adult Behavioral Health Plan of Care  Goal: Patient-Specific Goal (Individualization)  Description: Patient will attend 75% of group programming.   Patient will eat 75% of meals.   Patient will sleep 6-8 hours per night.   Pt will be agreeable to treatment team recommendations       Outcome: Progressing  Note: 15:40: Received end of shift report from DAVID Jean. Pt resting in bed upon arrival-    21:00: Compliant with olanzapine ODT 30 mg administration, continues paranoid, isolative activity. Briefly out to retreive meal tray, fluids via out shift. Denies all mental health criteria. Little communication with this writer et/or staff. No group participation.     Face to face end of shift report to be communicated to on-coming [facility] staff.     Saranya Abdi RN  7/4/2023  10:53 PM             Problem: Thought Process Alteration  Goal: Optimal Thought Clarity  Description: Patient will have clear linear conversations while on the unit.  Outcome: Progressing     Problem: Suicidal Behavior  Goal: Suicidal Behavior is Absent or Managed  Description: Patient will remain free of self harm while on the unit.  Patient will be able to list 2 coping skills by discharge.  Patient will inform staff of suicidal thoughts while on the unit.   Outcome: Progressing   Goal Outcome Evaluation:

## 2023-07-04 NOTE — PLAN OF CARE
Face to face end of shift report will be communicated to oncoming RN.    Problem: Adult Behavioral Health Plan of Care  Goal: Patient-Specific Goal (Individualization)  Description: Patient will attend 75% of group programming.   Patient will eat 75% of meals.   Patient will sleep 6-8 hours per night.   Pt will be agreeable to treatment team recommendations       Outcome: Progressing     Problem: Thought Process Alteration  Goal: Optimal Thought Clarity  Description: Patient will have clear linear conversations while on the unit.  Outcome: Progressing     Problem: Suicidal Behavior  Goal: Suicidal Behavior is Absent or Managed  Description: Patient will remain free of self harm while on the unit.  Patient will be able to list 2 coping skills by discharge.  Patient will inform staff of suicidal thoughts while on the unit.   Outcome: Progressing   Face to face end of shift report obtained from DAVID Bernstein. Pt observed resting in bed.  Pt appears to be sleeping in bed with eyes closed. 15 minutes and PRN safety checks completed with no noted complains. No self harm or delusional thoughts noted or reported so far this shift.   0600-Pt appeared to had slept all night.

## 2023-07-04 NOTE — PLAN OF CARE
"  Problem: Adult Behavioral Health Plan of Care  Goal: Patient-Specific Goal (Individualization)  Description: Patient will attend 75% of group programming.   Patient will eat 75% of meals.   Patient will sleep 6-8 hours per night.   Pt will be agreeable to treatment team recommendations     Patient is withdrawn, he does not engage in conversation with his peers when on the open unit, otherwise he spends time in his room sitting on his bed. Affect is flat, mood is irritable. Speech and eye contact are minimal. He is anxious and depressed. When asked how he slept, patient states \"crummy\" but will not elaborate. Has been appropriate with staff and peers.   Face to face end of shift report communicated to oncelyssa RN.     Miranda Regan RN  7/4/2023  2:32 PM    Outcome: Progressing     Problem: Thought Process Alteration  Goal: Optimal Thought Clarity  Description: Patient will have clear linear conversations while on the unit.  Outcome: Progressing     Problem: Suicidal Behavior  Goal: Suicidal Behavior is Absent or Managed  Description: Patient will remain free of self harm while on the unit.  Patient will be able to list 2 coping skills by discharge.  Patient will inform staff of suicidal thoughts while on the unit.   Outcome: Progressing   Goal Outcome Evaluation:    Plan of Care Reviewed With: patient                   "

## 2023-07-05 PROCEDURE — 99232 SBSQ HOSP IP/OBS MODERATE 35: CPT | Mod: GT | Performed by: PSYCHIATRY & NEUROLOGY

## 2023-07-05 PROCEDURE — 124N000004

## 2023-07-05 PROCEDURE — 250N000013 HC RX MED GY IP 250 OP 250 PS 637: Performed by: PSYCHIATRY & NEUROLOGY

## 2023-07-05 RX ADMIN — OLANZAPINE 30 MG: 10 TABLET, ORALLY DISINTEGRATING ORAL at 21:30

## 2023-07-05 RX ADMIN — LORAZEPAM 1 MG: 0.5 TABLET ORAL at 08:24

## 2023-07-05 ASSESSMENT — ACTIVITIES OF DAILY LIVING (ADL)
ADLS_ACUITY_SCORE: 41
HYGIENE/GROOMING: INDEPENDENT
ORAL_HYGIENE: INDEPENDENT
ADLS_ACUITY_SCORE: 41
DRESS: SCRUBS (BEHAVIORAL HEALTH)
ADLS_ACUITY_SCORE: 41

## 2023-07-05 NOTE — PLAN OF CARE
Problem: Adult Behavioral Health Plan of Care  Goal: Patient-Specific Goal (Individualization)  Description: Patient will attend 75% of group programming.   Patient will eat 75% of meals.   Patient will sleep 6-8 hours per night.   Pt will be agreeable to treatment team recommendations   Outcome: Progressing     Problem: Thought Process Alteration  Goal: Optimal Thought Clarity  Description: Patient will have clear linear conversations while on the unit.  Outcome: Progressing     Problem: Suicidal Behavior  Goal: Suicidal Behavior is Absent or Managed  Description: Patient will remain free of self harm while on the unit.  Patient will be able to list 2 coping skills by discharge.  Patient will inform staff of suicidal thoughts while on the unit.   Outcome: Progressing     Face to face shift report received from Saranya. Rounding completed, pt observed laying in bed, appeared to be sleeping, non-labored even respirations noted.    Patient appeared to be sleeping for approximately 8.25 hours since 2045 last shift.    Patient had no reported or observed suicidal behavior or self harm this shift.      Patient had no reported hallucinations or paranoia noted.    Face to face report will be communicated to oncoming RN.    Cece Messer RN  7/5/2023  6:46 AM

## 2023-07-05 NOTE — PLAN OF CARE
Problem: Adult Behavioral Health Plan of Care  Goal: Patient-Specific Goal (Individualization)  Description: Patient will attend 75% of group programming.   Patient will eat 75% of meals.   Patient will sleep 6-8 hours per night.   Pt will be agreeable to treatment team recommendations       Outcome: Not Progressing  Note: 15:40: Received end of shift report from DAVID Bhardwaj. Pt resting in bed upon arrival---     21:00: Compliant with olanzapine ODT 30 mg administration, continues paranoid, isolative activity. Briefly out to retreive meal tray, fluids via out shift. Denies all mental health criteria. Little communication with this writer et/or staff. No group participation. Per staff pt showered yesterday PM.     Face to face end of shift report to be communicated to on-coming [facility] staff.     Saranya Abdi RN  7/5/2023  11:04 PM             Problem: Thought Process Alteration  Goal: Optimal Thought Clarity  Description: Patient will have clear linear conversations while on the unit.  Outcome: Progressing     Problem: Suicidal Behavior  Goal: Suicidal Behavior is Absent or Managed  Description: Patient will remain free of self harm while on the unit.  Patient will be able to list 2 coping skills by discharge.  Patient will inform staff of suicidal thoughts while on the unit.   Outcome: Progressing   Goal Outcome Evaluation:

## 2023-07-05 NOTE — PLAN OF CARE
Problem: Adult Behavioral Health Plan of Care  Goal: Patient-Specific Goal (Individualization)  Description: Patient will attend 75% of group programming.   Patient will eat 75% of meals.   Patient will sleep 6-8 hours per night.   Pt will be agreeable to treatment team recommendations       Outcome: Progressing     Problem: Thought Process Alteration  Goal: Optimal Thought Clarity  Description: Patient will have clear linear conversations while on the unit.  Outcome: Progressing     Problem: Suicidal Behavior  Goal: Suicidal Behavior is Absent or Managed  Description: Patient will remain free of self harm while on the unit.  Patient will be able to list 2 coping skills by discharge.  Patient will inform staff of suicidal thoughts while on the unit.   Outcome: Progressing   Goal Outcome Evaluation:       Pt. In room, ate breakfast in room, appetite is good, ate 100%, slept 8 hours last night, has been free from harm/injury, denies suicidal ideation, does not initiate conversation or elaborate on any questions, isolates to room, does come out to get his meal trays and brings them back to room to eat, will continue to monitor progress.    Face to face end of shift report will be communicated to oncoming afternoon shift RN.     Lashae Zuñiga RN  7/5/2023  11:38 AM

## 2023-07-05 NOTE — PROGRESS NOTES
"  [facility] PSYCHIATRY  -  PROGRESS NOTE     ID   Name: Andre Bustos  MRN#: [id number]     SUBJECTIVE   Prior to interviewing the patient, I met with nursing and reviewed patient's clinical condition. We discussed clinical care both before and after the interview. I have reviewed the patient's clinical course by review of records including previous notes, labs, and vital signs.     Per nursing, the patient had the following behavioral events over the last 24-hours: none.    On psychiatric interview, Andre is met wtihin his room. Reports he slept \"good\" last night, suggested that this may have been secondary to the seroquel he took.  He states he did not take the seroquel. Discussed with him that the MAR states otherwise. He was encourage dto continue to take it, he states \"I took that in the hospital before\".  I informed him that that is one of the reasons we started it again and he states \"okay then\".  He states he is going to call his mom. Denies any physical pain.       MEDICATIONS   Scheduled Meds:    LORazepam  1 mg Oral BID     nicotine  1 patch Transdermal Daily     nicotine   Transdermal Q8H     OLANZapine zydis  30 mg Oral At Bedtime     QUEtiapine  100 mg Oral At Bedtime     PRN Meds:.acetaminophen, alum & mag hydroxide-simethicone, hydrOXYzine, melatonin, nicotine, OLANZapine **OR** OLANZapine, polyethylene glycol     ALLERGIES   No Known Allergies     VITALS   Vitals: /64   Pulse 88   Temp 97.1  F (36.2  C) (Tympanic)   Resp 18   Ht 1.778 m (5' 10\")   Wt 61.5 kg (135 lb 9.6 oz)   SpO2 96%   BMI 19.46 kg/m       MENTAL STATUS EXAM   Appearance:  awake, adequate grooming  Attitude:  cooperative  Eye Contact: fair  Mood: \"ok\"  Affect: flat, tinge of irritability   Speech:  mumbles  Psychomotor Behavior:  no evidence of tardive dyskinesia, dystonia, or tics  Thought Process: illogical  Associations:  no loose associations  Thought Content:  no evidence of suicidal ideation or homicidal " ideation, paranoia prominent  Insight:  limited  Judgment:  limited  Oriented to: oriented to person and place only  Attention Span and Concentration:  impaired  Recent and Remote Memory:  impaired  Gait and Station: Normal           LABS   No results found for this or any previous visit (from the past 24 hour(s)).      ASSESSMENT   Andre Bustos is a 48 year old male with a past psychiatric history notable for schizoaffective disorder, bipolar type and alcohol use disorder (associated with alcohol withdrawal seizures). Prior inpatient psychiatric hospitalizations, last known in Sept 2021 at [facility]. Previously psychiatric provider, Dr. Contreras from [id number] and most recently Miguelina STREETER CNP in August 2021. Presents to outside emergency department accompanied by law enforcement after being found disorganized twice in the community in same day, walking around naked. Patient was subsequently transferred and accepted for inpatient psychiatric hospitalization at [facility] for further safety and further stabilization      Daily Progress: did take Seroquel 100 mg at bedtime last evening. Will continue.  should be visiting this week.          DIAGNOSTIC FORMULATION   #schizoaffective disorder, depressed type  #alcohol use disorder, remission status unknown  #Malnutrition related to inadequate energy/protein intake as evidenced by weight loss and loss of muscle and subcutaneous fat.     PLAN     Location: Unit 5  Legal Status: Orders Placed This Encounter      Voluntary    Safety Assessment:    Behavioral Orders   Procedures     Code 1 - Restrict to Unit     Routine Programming     As clinically indicated     Status 15     Every 15 minutes.        PTA medications held:   - none (not taking for extended period of time)     PTA medications continued/changed:   -none (nonadherent)     New medications initiated:   -titrating olanzapine  -ativan 1 mg BID  -Seroquel  50 mg BID, --> seroquel 100 mg at bedtime     Today's Changes:  -encourage him to take Seroquel 100 mg at bedtime     Programming: Patient will be treated in a therapeutic milieu with appropriate individual and group therapies. Education will be provided on diagnoses, medications, and treatments. Encouraged behavioral activation and participation in group programming.     Medical diagnoses:  Per medicine    Disposition: pending clinical course        TREATMENT TEAM CARE PLAN     Progress: Continued symptoms.    Continued Stay Criteria/Rationale: Continued symptoms without sufficient improvement/resolution.    Medical/Physical: See above.    Precautions: See above.     Plan: Continue inpatient care with unit support and medication management.    Rationale for change in precautions or plan: NA due to no change.    Participants: Pedro Conde MD, Nursing, SW, OT.    The patient's care was discussed with the treatment team and chart notes were reviewed.       ATTESTATION    Pedro Conde MD  [facility]   Psychiatry    Video Visit: Patient has given verbal consent for video visit?: Yes  Type of Service: video visit for mental health treatment  Reason for Video Visit: COVID-19 [place]  Originating Site (patient location): [facility]  Distant Site (provider location): Remote Location  Mode of Communication: Video Conference via [employer]  Time of Service: Date: 07/05/2023 , Start: 08:00 end: 08:30

## 2023-07-06 VITALS
WEIGHT: 135.6 LBS | SYSTOLIC BLOOD PRESSURE: 104 MMHG | OXYGEN SATURATION: 97 % | DIASTOLIC BLOOD PRESSURE: 73 MMHG | RESPIRATION RATE: 18 BRPM | TEMPERATURE: 97.5 F | HEART RATE: 94 BPM | HEIGHT: 70 IN | BODY MASS INDEX: 19.41 KG/M2

## 2023-07-06 PROCEDURE — 99239 HOSP IP/OBS DSCHRG MGMT >30: CPT | Mod: GT | Performed by: PSYCHIATRY & NEUROLOGY

## 2023-07-06 PROCEDURE — 999N000104 HC STATISTIC NO CHARGE

## 2023-07-06 PROCEDURE — 250N000013 HC RX MED GY IP 250 OP 250 PS 637: Performed by: PSYCHIATRY & NEUROLOGY

## 2023-07-06 RX ORDER — OLANZAPINE 15 MG/1
30 TABLET, ORALLY DISINTEGRATING ORAL AT BEDTIME
Qty: 30 TABLET | Refills: 0
Start: 2023-07-06 | End: 2023-08-05

## 2023-07-06 RX ORDER — LORAZEPAM 1 MG/1
1 TABLET ORAL 2 TIMES DAILY
Qty: 60 TABLET | Refills: 0
Start: 2023-07-06 | End: 2023-08-05

## 2023-07-06 RX ADMIN — LORAZEPAM 1 MG: 0.5 TABLET ORAL at 08:26

## 2023-07-06 ASSESSMENT — ACTIVITIES OF DAILY LIVING (ADL)
ORAL_HYGIENE: INDEPENDENT
ADLS_ACUITY_SCORE: 41
HYGIENE/GROOMING: INDEPENDENT
ADLS_ACUITY_SCORE: 41
ADLS_ACUITY_SCORE: 41
DRESS: SCRUBS (BEHAVIORAL HEALTH)
ADLS_ACUITY_SCORE: 41
ADLS_ACUITY_SCORE: 41

## 2023-07-06 NOTE — PROGRESS NOTES
"  [facility] PSYCHIATRY  -  PROGRESS NOTE     ID   Name: Andre Bustos  MRN#: [id number]     SUBJECTIVE   Prior to interviewing the patient, I met with nursing and reviewed patient's clinical condition. We discussed clinical care both before and after the interview. I have reviewed the patient's clinical course by review of records including previous notes, labs, and vital signs.     Per nursing, the patient had the following behavioral events over the last 24-hours: unable to engage in cognitive testing    On psychiatric interview, Andre is met wtihin his room. Reviewed that MAR was incorrect yesterday and that his seroquel was placed back as he did not take it.  He states he will take it tonight.  Discussed he has said this for the past 5 nights and that we may want to explore changing his neuroleptic medications. He asked me again today if his commitment was 6 months in length. Discussed with Andre that I have informed him of the answer to this several times already.  Asked him how long he thought it was and he stated \"I dont know\".  I informed him it was 6 months in length.  He states \"Well anyone who was in an alley like me would be in this situation\".  He is not able to tell me what he means by this. Continues to be paranoid.        MEDICATIONS   Scheduled Meds:    LORazepam  1 mg Oral BID     nicotine  1 patch Transdermal Daily     nicotine   Transdermal Q8H     OLANZapine zydis  30 mg Oral At Bedtime     QUEtiapine  100 mg Oral At Bedtime     PRN Meds:.acetaminophen, alum & mag hydroxide-simethicone, hydrOXYzine, melatonin, nicotine, OLANZapine **OR** OLANZapine, polyethylene glycol     ALLERGIES   No Known Allergies     VITALS   Vitals: /73   Pulse 94   Temp 97.5  F (36.4  C) (Tympanic)   Resp 18   Ht 1.778 m (5' 10\")   Wt 61.5 kg (135 lb 9.6 oz)   SpO2 97%   BMI 19.46 kg/m       MENTAL STATUS EXAM   Appearance:  awake, adequate grooming  Attitude:  cooperative  Eye " "Contact: fair  Mood: \"fine\"  Affect: flat, tinge of irritability   Speech:  mumbles  Psychomotor Behavior:  no evidence of tardive dyskinesia, dystonia, or tics  Thought Process: illogical  Associations:  no loose associations  Thought Content:  no evidence of suicidal ideation or homicidal ideation, paranoia prominent  Insight:  limited  Judgment:  limited  Oriented to: oriented to person and place only  Attention Span and Concentration:  impaired  Recent and Remote Memory:  impaired  Gait and Station: Normal           LABS   No results found for this or any previous visit (from the past 24 hour(s)).      ASSESSMENT   Andre Bustos is a 48 year old male with a past psychiatric history notable for schizoaffective disorder, bipolar type and alcohol use disorder (associated with alcohol withdrawal seizures). Prior inpatient psychiatric hospitalizations, last known in Sept 2021 at [facility]. Previously psychiatric provider, Dr. Contreras from [id number] and most recently Miguelina STREETER CNP in August 2021. Presents to outside emergency department accompanied by law enforcement after being found disorganized twice in the community in same day, walking around naked. Patient was subsequently transferred and accepted for inpatient psychiatric hospitalization at [facility] for further safety and further stabilization      Daily Progress: case manage to visit unit. Will explore his disposition options. He is requiring a supervised setting. He is not able to engage in cognitive testing.          DIAGNOSTIC FORMULATION   #schizoaffective disorder, depressed type  #alcohol use disorder, remission status unknown  #Malnutrition related to inadequate energy/protein intake as evidenced by weight loss and loss of muscle and subcutaneous fat.     PLAN     Location: Unit 5  Legal Status: Orders Placed This Encounter      Voluntary    Safety Assessment:    Behavioral Orders   Procedures     " Code 1 - Restrict to Unit     Routine Programming     As clinically indicated     Status 15     Every 15 minutes.        PTA medications held:   - none (not taking for extended period of time)     PTA medications continued/changed:   -none (nonadherent)     New medications initiated:   -titrating olanzapine  -ativan 1 mg BID  -Seroquel 50 mg BID, --> seroquel 100 mg at bedtime     Today's Changes:  -encourage him to take Seroquel 100 mg at bedtime     Programming: Patient will be treated in a therapeutic milieu with appropriate individual and group therapies. Education will be provided on diagnoses, medications, and treatments. Encouraged behavioral activation and participation in group programming.     Medical diagnoses:  Per medicine    Disposition: pending clinical course        TREATMENT TEAM CARE PLAN     Progress: Continued symptoms.    Continued Stay Criteria/Rationale: Continued symptoms without sufficient improvement/resolution.    Medical/Physical: See above.    Precautions: See above.     Plan: Continue inpatient care with unit support and medication management.    Rationale for change in precautions or plan: NA due to no change.    Participants: Pedro Conde MD, Nursing, SW, OT.    The patient's care was discussed with the treatment team and chart notes were reviewed.       ATTESTATION    Pedro Conde MD  [facility]   Psychiatry    Video Visit: Patient has given verbal consent for video visit?: Yes  Type of Service: video visit for mental health treatment  Reason for Video Visit: COVID-19 [place]  Originating Site (patient location): [facility]  Distant Site (provider location): Remote Location  Mode of Communication: Video Conference via [employer]  Time of Service: Date: 07/06/2023 , Start: 08:00 end: 08:30

## 2023-07-06 NOTE — PROGRESS NOTES
Pt is discharging at the recommendation of the treatment team. Pt is discharging to [facility] transported by his [facility]  Todd Adiar. Pt denies having any thoughts of hurting themself or anyone else. Pt denies anxiety or depression. Pt has follow up with  and [facility]. Discharge instructions, including; demographic sheet, psychiatric evaluation, discharge summary, and AVS were faxed to these next level of care providers.

## 2023-07-06 NOTE — PROGRESS NOTES
The patient was accepted to [facility] for either today or tomorrow. The patient's  is working on transport. SW updated nursing and provider.

## 2023-07-06 NOTE — PLAN OF CARE
Problem: Adult Behavioral Health Plan of Care  Goal: Patient-Specific Goal (Individualization)  Description: Patient will attend 75% of group programming.   Patient will eat 75% of meals.   Patient will sleep 6-8 hours per night.   Pt will be agreeable to treatment team recommendations   Outcome: Progressing     Problem: Thought Process Alteration  Goal: Optimal Thought Clarity  Description: Patient will have clear linear conversations while on the unit.  Outcome: Progressing     Problem: Suicidal Behavior  Goal: Suicidal Behavior is Absent or Managed  Description: Patient will remain free of self harm while on the unit.  Patient will be able to list 2 coping skills by discharge.  Patient will inform staff of suicidal thoughts while on the unit.   Outcome: Progressing     Face to face shift report received from Saranya. Rounding completed, pt observed laying in bed, appeared to be sleeping, non-labored even respirations noted.     Patient appeared to be sleeping for approximately 8.75 hours since 2115 last shift.     Patient had no reported or observed suicidal behavior or self harm this shift.       Patient had no reported hallucinations or paranoia noted.    Face to face report will be communicated to oncoming RN.    Cece Messer RN  7/6/2023  6:24 AM

## 2023-07-06 NOTE — DISCHARGE SUMMARY
[facility] PSYCHIATRY  DISCHARGE SUMMARY     DISCHARGE DATA     Andre Bustos MRN# [id number]   Age: 48 year old YOB: 1975     Date of Admission: 6/5/2023  Date of Discharge: July 6, 2023  Discharge Provider: Pedro Conde MD       REASON FOR ADMISSION   Andre Bustos is a 48 year old male with a past psychiatric history notable for schizoaffective disorder, bipolar type and alcohol use disorder (associated with alcohol withdrawal seizures). Prior inpatient psychiatric hospitalizations, last known in Sept 2021 at [facility]. Previously psychiatric provider, Dr. Contreras from [id number] and most recently Miguelina STREETER CNP in August 2021. Presents to outside emergency department accompanied by law enforcement after being found disorganized twice in the community in same day, walking around naked. Patient was subsequently transferred and accepted for inpatient psychiatric hospitalization at [facility] for further safety and further stabilization     Etiology consistent with schizoaffective disorder, depressed type.         DISCHARGE DIAGNOSES   #schizoaffective disorder, depressed type  #alcohol use disorder, remission status unknown  #Catatonia was present on admission and was treated throughout hospitalization  #Malnutrition related to inadequate energy/protein intake as evidenced by weight loss and loss of muscle and subcutaneous fat.     HOSPITAL COURSE   Patient was admitted to unit 5 due to the aforementioned presentation. The patient was placed under 15 minute checks to ensure patient safety. The patient participated in unit programming and groups as able.    Legal status during hospitalization was involuntary .Mr. Bustos did not require seclusion/restraint during hospitalization.     We reviewed with Mr. Bustos current and past medication trials including duration, dose, response and side effects. During this hospitalization, the following  "changes to the patient's psychotropic medications were made:    PTA medications held:   - none (not taking for extended period of time)     PTA medications continued/changed:   -none (nonadherent)     New medications initiated:   Olanzapine 30 mg at bedtime  Lorazepam BID      Mr. Bustos progressed slowly related to response to medication changes . By the time of discharge, his symptoms had improved minimally. The treatment goals (long-term goal/discharge criteria) were not reached.        DISCHARGE MEDICATIONS     Current Discharge Medication List      START taking these medications    Details   OLANZapine zydis (ZYPREXA) 15 MG ODT Take 2 tablets (30 mg) by mouth At Bedtime for 30 days  Qty: 30 tablet, Refills: 0    Associated Diagnoses: Schizoaffective disorder, depressive type (H)      Lorazepam 1 mg BID          VITALS   Vitals: /73   Pulse 94   Temp 97.5  F (36.4  C) (Tympanic)   Resp 18   Ht 1.778 m (5' 10\")   Wt 61.5 kg (135 lb 9.6 oz)   SpO2 97%   BMI 19.46 kg/m       MENTAL STATUS EXAM   Appearance: Alert, oriented, dressed in hospital scrubs, appears stated age   Attitude: Cooperative   Eye Contact: poor  Mood: \"fine\"  Affect: Full range of affect  Speech: Normal rate and rhythm   Psychomotor Behavior: No tremor, rigidity, or psychomotor abnormality   Thought Process:illogical, paranoid  Associations: No loose associations   Thought Content: Denies SI or plan. No SIB. Believes he is constantly being monitored and watched  Insight: poor  Judgment: poor  Oriented to: Person, place, and time  Attention Span and Concentration: Intact  Recent and Remote Memory:impaired  Language: English with appropriate syntax and vocabulary  Fund of Knowledge: Average   Muscle Strength and Tone: Grossly normal  Gait and Station: Grossly normal       DISCHARGE PLAN     1.  Education given regarding diagnostic and treatment options with risks, benefits and alternatives with adequate verbalization of " understanding.  2.  Discharge to [facility]. 3.  Continue aforementioned medications and associated medication changes with follow-up by outpatient provider.  3.  Nursing and  to review further discharge recommendations.   4.  Active issues: unstable psychosis  7.  Patient is being discharged with the following appointments as detailed below:    See AVS  Discharged to [facility]       DISCHARGE SERVICES PROVIDED     80 minutes spent on discharge services, including:  Final examination of patient.  Review and discussion of hospital stay.  Instructions for continued outpatient care/goals.  Preparation of discharge records.  Preparation of medications refills and new prescriptions.  Preparation of applicable referral forms.        ATTESTATION   Pedro Conde MD  [facility]   Psychiatry    Video Visit: Patient has given verbal consent for video visit?: Yes  Type of Service: video visit for mental health treatment  Reason for Video Visit: COVID-19 and [place]  Originating Site (patient location): [facility]  Distant Site (provider location): Remote Location  Mode of Communication: Video Conference via [employer]  Time of Service: Date: July 6, 2023 , Start: 11:00 end: 12:00     LABS THIS ADMISSION     No results found for any visits on 06/05/23.

## 2023-07-06 NOTE — PLAN OF CARE
"  Problem: Adult Behavioral Health Plan of Care  Goal: Patient-Specific Goal (Individualization)  Description: Patient will attend 75% of group programming.   Patient will eat 75% of meals.   Patient will sleep 6-8 hours per night.   Pt will be agreeable to treatment team recommendations       Outcome: Progressing     Problem: Thought Process Alteration  Goal: Optimal Thought Clarity  Description: Patient will have clear linear conversations while on the unit.  Outcome: Progressing     Problem: Suicidal Behavior  Goal: Suicidal Behavior is Absent or Managed  Description: Patient will remain free of self harm while on the unit.  Patient will be able to list 2 coping skills by discharge.  Patient will inform staff of suicidal thoughts while on the unit.   Outcome: Progressing   Goal Outcome Evaluation:       Pt. Spending most of time in room, does come out to obtain meal trays and to get coffee or water, answers most questions, but does not elaborate on anything, does not initiate conversation, is not social with peers, taking most prescribed medications, denies suicidal ideation, has been free from harm/injury, is able to make needs be known, slept 8.75 hours last night, appetite is good, ate 100% of breakfast, does not attend group therapy sessions, denies hallucinations, states \"I'm okay\", will continue to monitor progress.    Face to face end of shift report will be communicated to oncoming afternoon shift RN.     Lashae Zuñiga RN  7/6/2023  11:16 AM                       "

## 2023-07-06 NOTE — PROGRESS NOTES
Occupational Therapy Discharge Summary    Reason for therapy discharge:    Discharged to [facility].    Progress towards therapy goal(s). See goals on Care Plan in [facility] electronic health record for goal details.  Goals partially met.  Barriers to achieving goals:   limited tolerance for therapy.    Therapy recommendation(s):    Patient could benefit from further testing as patient continues to stabilize. At this point in time it is recommended that patient be placed in a small group home where meds and food can be provided.

## 2023-07-06 NOTE — PROGRESS NOTES
"Patient notes that he 'doesn't think that he needs this\". This is in reference to further cognitive testing with OT. Discussed with patient in regards to what he thinks he needs to be successful after discharge and he reports he currently has no income. Discussed housing options as last week patient had identified wanting something with few people in it. Patient more engaging today as compared to last week. Patient is expressing more insight to the next step than last week. Does still exhibit disconnect in problem solving. Still expressing paranoia about people watching him. Did discuss with patient that OT would come back tomorrow and offer testing again.    "

## 2023-07-06 NOTE — PLAN OF CARE
Goal Outcome Evaluation:        Discharge Note    Patient Discharged to [facility] on 7/6/2023 1:26 PM via Health plan transportation accompanied by Pavel Adair to transport.     Patient informed of discharge instructions in AVS. patient verbalizes understanding and denies having any questions pertaining to AVS. Patient stable at time of discharge. Patient denies SI, HI, and thoughts of self harm at time of discharge. All personal belongings returned to patient.  Psych evaluation, history and physical, AVS, and discharge summary faxed to next level of care- per  (Monday-Friday).     Lashae Zuñiga RN  7/6/2023  1:26 PM

## 2023-07-07 ENCOUNTER — TELEPHONE (OUTPATIENT)
Dept: BEHAVIORAL HEALTH | Facility: HOSPITAL | Age: 48
End: 2023-07-07

## 2023-07-07 NOTE — TELEPHONE ENCOUNTER
"Kaylee Range: Post-Hospital Discharge Note     Situation   Post hospital discharge call placed 07/07/23.    This writer spoke with staff at [facility] who transferred this writer to a voicemail.  Left a message with call back number.    Inpatient Mental Health Admission Information:  Admission Date: 6/5/23  Admission Reason: psychosis/disorganized behavior (Etiology consistent with schizoaffective disorder, depressed type)  Inpatient Mental Health Discharge Information:  Discharge Date: 7/6/23  Discharged to: [facility]  Discharge Diagnosis:   1. schizoaffective disorder, depressed type  2. alcohol use disorder, remission status unknown  3. Malnutrition related to inadequate energy/protein intake as evidenced by weight loss and loss of muscle and subcutaneous fat.    Background    The following information is obtained from the hospital after visit summary and inpatient provider notes.     \"Patient was admitted to unit 5 due to the aforementioned presentation. The patient was placed under 15 minute checks to ensure patient safety. The patient participated in unit programming and groups as able.     Legal status during hospitalization was involuntary .Mr. Bustos did not require seclusion/restraint during hospitalization.      We reviewed with Mr. Bustos current and past medication trials including duration, dose, response and side effects. During this hospitalization, the following changes to the patient's psychotropic medications were made:     PTA medications held:   - none (not taking for extended period of time)     PTA medications continued/changed:   -none (nonadherent)     New medications initiated:   Olanzapine 30 mg at bedtime  Lorazepam BID     Mr. Bustos progressed slowly related to response to medication changes . By the time of discharge, his symptoms had improved minimally. The treatment goals (long-term goal/discharge criteria) were not reached.\"    Outpatient Plan   -Discharge to [place] [facility].  -Continue aforementioned medications and associated medication changes with follow-up by outpatient provider.  Future Appointments:  Discharge follow up appointment scheduled within 14 days of discharging from hospital?- As arranged by [facility].    Resources:  [employer]  [address] [address]  Phone: [phone]  Fax: [phone]    Further information, barriers, or follow up this writer has addressed: N/A